# Patient Record
Sex: FEMALE | Race: WHITE | NOT HISPANIC OR LATINO | Employment: OTHER | URBAN - METROPOLITAN AREA
[De-identification: names, ages, dates, MRNs, and addresses within clinical notes are randomized per-mention and may not be internally consistent; named-entity substitution may affect disease eponyms.]

---

## 2018-02-15 ENCOUNTER — CONVERSION ENCOUNTER (OUTPATIENT)
Dept: FAMILY MEDICINE CLINIC | Facility: CLINIC | Age: 58
End: 2018-02-15

## 2018-02-15 ENCOUNTER — OFFICE VISIT CONVERTED (OUTPATIENT)
Dept: FAMILY MEDICINE CLINIC | Facility: CLINIC | Age: 58
End: 2018-02-15
Attending: NURSE PRACTITIONER

## 2018-06-22 ENCOUNTER — OFFICE VISIT CONVERTED (OUTPATIENT)
Dept: FAMILY MEDICINE CLINIC | Facility: CLINIC | Age: 58
End: 2018-06-22
Attending: NURSE PRACTITIONER

## 2018-07-12 ENCOUNTER — OFFICE VISIT CONVERTED (OUTPATIENT)
Dept: PODIATRY | Facility: CLINIC | Age: 58
End: 2018-07-12
Attending: PODIATRIST

## 2018-07-12 ENCOUNTER — CONVERSION ENCOUNTER (OUTPATIENT)
Dept: PODIATRY | Facility: CLINIC | Age: 58
End: 2018-07-12

## 2018-07-25 ENCOUNTER — OFFICE VISIT CONVERTED (OUTPATIENT)
Dept: FAMILY MEDICINE CLINIC | Facility: CLINIC | Age: 58
End: 2018-07-25
Attending: NURSE PRACTITIONER

## 2018-07-30 ENCOUNTER — CONVERSION ENCOUNTER (OUTPATIENT)
Dept: CT IMAGING | Facility: HOSPITAL | Age: 58
End: 2018-07-30

## 2018-08-01 ENCOUNTER — OFFICE VISIT CONVERTED (OUTPATIENT)
Dept: FAMILY MEDICINE CLINIC | Facility: CLINIC | Age: 58
End: 2018-08-01
Attending: NURSE PRACTITIONER

## 2018-08-02 ENCOUNTER — OFFICE VISIT CONVERTED (OUTPATIENT)
Dept: PODIATRY | Facility: CLINIC | Age: 58
End: 2018-08-02
Attending: PODIATRIST

## 2018-08-02 ENCOUNTER — CONVERSION ENCOUNTER (OUTPATIENT)
Dept: PODIATRY | Facility: CLINIC | Age: 58
End: 2018-08-02

## 2018-09-05 ENCOUNTER — OFFICE VISIT CONVERTED (OUTPATIENT)
Dept: FAMILY MEDICINE CLINIC | Facility: CLINIC | Age: 58
End: 2018-09-05
Attending: NURSE PRACTITIONER

## 2020-01-20 ENCOUNTER — HOSPITAL ENCOUNTER (OUTPATIENT)
Dept: OTHER | Facility: HOSPITAL | Age: 60
Discharge: HOME | End: 2020-01-20
Attending: NURSE PRACTITIONER

## 2020-01-20 ENCOUNTER — OFFICE VISIT CONVERTED (OUTPATIENT)
Dept: FAMILY MEDICINE CLINIC | Facility: CLINIC | Age: 60
End: 2020-01-20
Attending: NURSE PRACTITIONER

## 2020-01-20 ENCOUNTER — HOSPITAL ENCOUNTER (OUTPATIENT)
Dept: FAMILY MEDICINE CLINIC | Facility: CLINIC | Age: 60
Discharge: HOME | End: 2020-01-20
Attending: NURSE PRACTITIONER

## 2020-01-20 LAB
ALBUMIN SERPL-MCNC: 4.6 G/DL (ref 3.5–5)
ALBUMIN/GLOB SERPL: 1.6 {RATIO} (ref 1.4–2.6)
ALP SERPL-CCNC: 86 U/L (ref 53–141)
ALT SERPL-CCNC: 20 U/L (ref 10–40)
AMYLASE SERPL-CCNC: 69 U/L (ref 30–110)
ANION GAP SERPL CALC-SCNC: 18 MMOL/L (ref 8–19)
APPEARANCE UR: CLEAR
AST SERPL-CCNC: 20 U/L (ref 15–50)
BASOPHILS # BLD AUTO: 0.04 10*3/UL (ref 0–0.2)
BASOPHILS NFR BLD AUTO: 0.6 % (ref 0–3)
BILIRUB SERPL-MCNC: 0.34 MG/DL (ref 0.2–1.3)
BILIRUB UR QL: NEGATIVE
BUN SERPL-MCNC: 12 MG/DL (ref 5–25)
BUN/CREAT SERPL: 16 {RATIO} (ref 6–20)
CALCIUM SERPL-MCNC: 9.8 MG/DL (ref 8.7–10.4)
CHLORIDE SERPL-SCNC: 100 MMOL/L (ref 99–111)
COLOR UR: YELLOW
CONV ABS IMM GRAN: 0.01 10*3/UL (ref 0–0.2)
CONV BACTERIA: ABNORMAL
CONV CO2: 26 MMOL/L (ref 22–32)
CONV COLLECTION SOURCE (UA): ABNORMAL
CONV IMMATURE GRAN: 0.2 % (ref 0–1.8)
CONV TOTAL PROTEIN: 7.4 G/DL (ref 6.3–8.2)
CONV UROBILINOGEN IN URINE BY AUTOMATED TEST STRIP: 0.2 {EHRLICHU}/DL (ref 0.1–1)
CREAT UR-MCNC: 0.73 MG/DL (ref 0.5–0.9)
DEPRECATED RDW RBC AUTO: 43.5 FL (ref 36.4–46.3)
EOSINOPHIL # BLD AUTO: 0.09 10*3/UL (ref 0–0.7)
EOSINOPHIL # BLD AUTO: 1.4 % (ref 0–7)
ERYTHROCYTE [DISTWIDTH] IN BLOOD BY AUTOMATED COUNT: 12.5 % (ref 11.7–14.4)
GFR SERPLBLD BASED ON 1.73 SQ M-ARVRAT: >60 ML/MIN/{1.73_M2}
GLOBULIN UR ELPH-MCNC: 2.8 G/DL (ref 2–3.5)
GLUCOSE SERPL-MCNC: 92 MG/DL (ref 65–99)
GLUCOSE UR QL: NEGATIVE MG/DL
HCT VFR BLD AUTO: 45.2 % (ref 37–47)
HGB BLD-MCNC: 14.6 G/DL (ref 12–16)
HGB UR QL STRIP: NEGATIVE
KETONES UR QL STRIP: NEGATIVE MG/DL
LEUKOCYTE ESTERASE UR QL STRIP: ABNORMAL
LIPASE SERPL-CCNC: 61 U/L (ref 5–51)
LYMPHOCYTES # BLD AUTO: 1.87 10*3/UL (ref 1–5)
LYMPHOCYTES NFR BLD AUTO: 28.5 % (ref 20–45)
MCH RBC QN AUTO: 30.3 PG (ref 27–31)
MCHC RBC AUTO-ENTMCNC: 32.3 G/DL (ref 33–37)
MCV RBC AUTO: 93.8 FL (ref 81–99)
MONOCYTES # BLD AUTO: 0.6 10*3/UL (ref 0.2–1.2)
MONOCYTES NFR BLD AUTO: 9.1 % (ref 3–10)
NEUTROPHILS # BLD AUTO: 3.96 10*3/UL (ref 2–8)
NEUTROPHILS NFR BLD AUTO: 60.2 % (ref 30–85)
NITRITE UR QL STRIP: NEGATIVE
NRBC CBCN: 0 % (ref 0–0.7)
OSMOLALITY SERPL CALC.SUM OF ELEC: 287 MOSM/KG (ref 273–304)
PH UR STRIP.AUTO: 7 [PH] (ref 5–8)
PLATELET # BLD AUTO: 264 10*3/UL (ref 130–400)
PMV BLD AUTO: 11.6 FL (ref 9.4–12.3)
POTASSIUM SERPL-SCNC: 4.6 MMOL/L (ref 3.5–5.3)
PROT UR QL: NEGATIVE MG/DL
RBC # BLD AUTO: 4.82 10*6/UL (ref 4.2–5.4)
RBC #/AREA URNS HPF: ABNORMAL /[HPF]
SODIUM SERPL-SCNC: 139 MMOL/L (ref 135–147)
SP GR UR: 1.01 (ref 1–1.03)
SQUAMOUS SPT QL MICRO: ABNORMAL /[HPF]
WBC # BLD AUTO: 6.57 10*3/UL (ref 4.8–10.8)
WBC #/AREA URNS HPF: ABNORMAL /[HPF]

## 2020-01-22 LAB
AMOXICILLIN+CLAV SUSC ISLT: 4
AMPICILLIN SUSC ISLT: >=32
AMPICILLIN+SULBAC SUSC ISLT: 16
BACTERIA UR CULT: ABNORMAL
CEFAZOLIN SUSC ISLT: >=64
CEFEPIME SUSC ISLT: 2
CEFTAZIDIME SUSC ISLT: 16
CEFTRIAXONE SUSC ISLT: >=64
CEFUROXIME ORAL SUSC ISLT: >=64
CEFUROXIME PARENTER SUSC ISLT: >=64
CIPROFLOXACIN SUSC ISLT: 0.5
ERTAPENEM SUSC ISLT: <=0.5
GENTAMICIN SUSC ISLT: <=1
LEVOFLOXACIN SUSC ISLT: 1
NITROFURANTOIN SUSC ISLT: <=16
TETRACYCLINE SUSC ISLT: <=1
TMP SMX SUSC ISLT: <=20
TOBRAMYCIN SUSC ISLT: <=1

## 2020-01-23 ENCOUNTER — HOSPITAL ENCOUNTER (OUTPATIENT)
Dept: CT IMAGING | Facility: HOSPITAL | Age: 60
Discharge: HOME | End: 2020-01-23
Attending: NURSE PRACTITIONER

## 2020-01-31 ENCOUNTER — OFFICE VISIT CONVERTED (OUTPATIENT)
Dept: FAMILY MEDICINE CLINIC | Facility: CLINIC | Age: 60
End: 2020-01-31
Attending: FAMILY MEDICINE

## 2020-02-21 ENCOUNTER — OFFICE VISIT CONVERTED (OUTPATIENT)
Dept: FAMILY MEDICINE CLINIC | Facility: CLINIC | Age: 60
End: 2020-02-21
Attending: FAMILY MEDICINE

## 2020-02-21 ENCOUNTER — HOSPITAL ENCOUNTER (OUTPATIENT)
Dept: FAMILY MEDICINE CLINIC | Facility: CLINIC | Age: 60
Discharge: HOME | End: 2020-02-21
Attending: FAMILY MEDICINE

## 2020-02-21 LAB
ALBUMIN SERPL-MCNC: 4.5 G/DL (ref 3.5–5)
ALBUMIN/GLOB SERPL: 1.6 {RATIO} (ref 1.4–2.6)
ALP SERPL-CCNC: 83 U/L (ref 53–141)
ALT SERPL-CCNC: 15 U/L (ref 10–40)
ANION GAP SERPL CALC-SCNC: 19 MMOL/L (ref 8–19)
AST SERPL-CCNC: 18 U/L (ref 15–50)
BASOPHILS # BLD AUTO: 0.06 10*3/UL (ref 0–0.2)
BASOPHILS NFR BLD AUTO: 1.1 % (ref 0–3)
BILIRUB SERPL-MCNC: 0.26 MG/DL (ref 0.2–1.3)
BUN SERPL-MCNC: 12 MG/DL (ref 5–25)
BUN/CREAT SERPL: 17 {RATIO} (ref 6–20)
CALCIUM SERPL-MCNC: 9.5 MG/DL (ref 8.7–10.4)
CHLORIDE SERPL-SCNC: 104 MMOL/L (ref 99–111)
CONV ABS IMM GRAN: 0.01 10*3/UL (ref 0–0.2)
CONV CO2: 23 MMOL/L (ref 22–32)
CONV IMMATURE GRAN: 0.2 % (ref 0–1.8)
CONV TOTAL PROTEIN: 7.3 G/DL (ref 6.3–8.2)
CREAT UR-MCNC: 0.69 MG/DL (ref 0.5–0.9)
DEPRECATED RDW RBC AUTO: 43.1 FL (ref 36.4–46.3)
EOSINOPHIL # BLD AUTO: 0.12 10*3/UL (ref 0–0.7)
EOSINOPHIL # BLD AUTO: 2.2 % (ref 0–7)
ERYTHROCYTE [DISTWIDTH] IN BLOOD BY AUTOMATED COUNT: 12.5 % (ref 11.7–14.4)
GFR SERPLBLD BASED ON 1.73 SQ M-ARVRAT: >60 ML/MIN/{1.73_M2}
GLOBULIN UR ELPH-MCNC: 2.8 G/DL (ref 2–3.5)
GLUCOSE SERPL-MCNC: 97 MG/DL (ref 65–99)
HCT VFR BLD AUTO: 41.9 % (ref 37–47)
HGB BLD-MCNC: 13.2 G/DL (ref 12–16)
LYMPHOCYTES # BLD AUTO: 1.61 10*3/UL (ref 1–5)
LYMPHOCYTES NFR BLD AUTO: 29.4 % (ref 20–45)
MCH RBC QN AUTO: 29.6 PG (ref 27–31)
MCHC RBC AUTO-ENTMCNC: 31.5 G/DL (ref 33–37)
MCV RBC AUTO: 93.9 FL (ref 81–99)
MONOCYTES # BLD AUTO: 0.51 10*3/UL (ref 0.2–1.2)
MONOCYTES NFR BLD AUTO: 9.3 % (ref 3–10)
NEUTROPHILS # BLD AUTO: 3.16 10*3/UL (ref 2–8)
NEUTROPHILS NFR BLD AUTO: 57.8 % (ref 30–85)
NRBC CBCN: 0 % (ref 0–0.7)
OSMOLALITY SERPL CALC.SUM OF ELEC: 294 MOSM/KG (ref 273–304)
PLATELET # BLD AUTO: 242 10*3/UL (ref 130–400)
PMV BLD AUTO: 11.6 FL (ref 9.4–12.3)
POTASSIUM SERPL-SCNC: 4.4 MMOL/L (ref 3.5–5.3)
RBC # BLD AUTO: 4.46 10*6/UL (ref 4.2–5.4)
SODIUM SERPL-SCNC: 142 MMOL/L (ref 135–147)
T4 FREE SERPL-MCNC: 1.2 NG/DL (ref 0.9–1.8)
TSH SERPL-ACNC: 0.91 M[IU]/L (ref 0.27–4.2)
WBC # BLD AUTO: 5.47 10*3/UL (ref 4.8–10.8)

## 2020-03-02 ENCOUNTER — OFFICE VISIT CONVERTED (OUTPATIENT)
Dept: GASTROENTEROLOGY | Facility: CLINIC | Age: 60
End: 2020-03-02
Attending: NURSE PRACTITIONER

## 2020-04-20 ENCOUNTER — TELEPHONE CONVERTED (OUTPATIENT)
Dept: FAMILY MEDICINE CLINIC | Facility: CLINIC | Age: 60
End: 2020-04-20
Attending: FAMILY MEDICINE

## 2020-05-28 LAB — SARS-COV-2 RNA SPEC QL NAA+PROBE: NOT DETECTED

## 2020-05-29 ENCOUNTER — HOSPITAL ENCOUNTER (OUTPATIENT)
Dept: GASTROENTEROLOGY | Facility: HOSPITAL | Age: 60
Setting detail: HOSPITAL OUTPATIENT SURGERY
Discharge: HOME | End: 2020-05-29
Attending: INTERNAL MEDICINE

## 2020-05-29 LAB
C DIFF TOX B STL QL CT TISS CULT: POSITIVE
CONV 027 TOXIN: NEGATIVE

## 2020-05-31 LAB — BACTERIA SPEC AEROBE CULT: NORMAL

## 2020-06-10 ENCOUNTER — HOSPITAL ENCOUNTER (OUTPATIENT)
Dept: GENERAL RADIOLOGY | Facility: HOSPITAL | Age: 60
Discharge: HOME | End: 2020-06-10
Attending: FAMILY MEDICINE

## 2020-07-07 ENCOUNTER — OFFICE VISIT CONVERTED (OUTPATIENT)
Dept: FAMILY MEDICINE CLINIC | Facility: CLINIC | Age: 60
End: 2020-07-07
Attending: NURSE PRACTITIONER

## 2020-07-07 ENCOUNTER — HOSPITAL ENCOUNTER (OUTPATIENT)
Dept: FAMILY MEDICINE CLINIC | Facility: CLINIC | Age: 60
Discharge: HOME | End: 2020-07-07
Attending: NURSE PRACTITIONER

## 2020-07-07 LAB
ANION GAP SERPL CALC-SCNC: 17 MMOL/L (ref 8–19)
BASOPHILS # BLD AUTO: 0.04 10*3/UL (ref 0–0.2)
BASOPHILS NFR BLD AUTO: 0.7 % (ref 0–3)
BUN SERPL-MCNC: 8 MG/DL (ref 5–25)
BUN/CREAT SERPL: 10 {RATIO} (ref 6–20)
CALCIUM SERPL-MCNC: 9.9 MG/DL (ref 8.7–10.4)
CHLORIDE SERPL-SCNC: 104 MMOL/L (ref 99–111)
CONV ABS IMM GRAN: 0.01 10*3/UL (ref 0–0.2)
CONV CO2: 24 MMOL/L (ref 22–32)
CONV IMMATURE GRAN: 0.2 % (ref 0–1.8)
CREAT UR-MCNC: 0.82 MG/DL (ref 0.5–0.9)
DEPRECATED RDW RBC AUTO: 44.4 FL (ref 36.4–46.3)
EOSINOPHIL # BLD AUTO: 0.23 10*3/UL (ref 0–0.7)
EOSINOPHIL # BLD AUTO: 4 % (ref 0–7)
ERYTHROCYTE [DISTWIDTH] IN BLOOD BY AUTOMATED COUNT: 12.7 % (ref 11.7–14.4)
GFR SERPLBLD BASED ON 1.73 SQ M-ARVRAT: >60 ML/MIN/{1.73_M2}
GLUCOSE SERPL-MCNC: 79 MG/DL (ref 65–99)
HCT VFR BLD AUTO: 45.1 % (ref 37–47)
HGB BLD-MCNC: 13.7 G/DL (ref 12–16)
LYMPHOCYTES # BLD AUTO: 1.58 10*3/UL (ref 1–5)
LYMPHOCYTES NFR BLD AUTO: 27.6 % (ref 20–45)
MCH RBC QN AUTO: 28.8 PG (ref 27–31)
MCHC RBC AUTO-ENTMCNC: 30.4 G/DL (ref 33–37)
MCV RBC AUTO: 94.7 FL (ref 81–99)
MONOCYTES # BLD AUTO: 0.66 10*3/UL (ref 0.2–1.2)
MONOCYTES NFR BLD AUTO: 11.5 % (ref 3–10)
NEUTROPHILS # BLD AUTO: 3.21 10*3/UL (ref 2–8)
NEUTROPHILS NFR BLD AUTO: 56 % (ref 30–85)
NRBC CBCN: 0 % (ref 0–0.7)
OSMOLALITY SERPL CALC.SUM OF ELEC: 287 MOSM/KG (ref 273–304)
PLATELET # BLD AUTO: 231 10*3/UL (ref 130–400)
PMV BLD AUTO: 11.7 FL (ref 9.4–12.3)
POTASSIUM SERPL-SCNC: 4.7 MMOL/L (ref 3.5–5.3)
RBC # BLD AUTO: 4.76 10*6/UL (ref 4.2–5.4)
SODIUM SERPL-SCNC: 140 MMOL/L (ref 135–147)
WBC # BLD AUTO: 5.73 10*3/UL (ref 4.8–10.8)

## 2020-07-08 LAB
C DIFF TOX B STL QL CT TISS CULT: POSITIVE
CONV 027 TOXIN: NEGATIVE

## 2020-07-17 ENCOUNTER — HOSPITAL ENCOUNTER (OUTPATIENT)
Dept: GENERAL RADIOLOGY | Facility: HOSPITAL | Age: 60
Discharge: HOME | End: 2020-07-17
Attending: FAMILY MEDICINE

## 2020-07-31 ENCOUNTER — OFFICE VISIT CONVERTED (OUTPATIENT)
Dept: FAMILY MEDICINE CLINIC | Facility: CLINIC | Age: 60
End: 2020-07-31
Attending: NURSE PRACTITIONER

## 2020-07-31 ENCOUNTER — HOSPITAL ENCOUNTER (OUTPATIENT)
Dept: GENERAL RADIOLOGY | Facility: HOSPITAL | Age: 60
Discharge: HOME | End: 2020-07-31
Attending: NURSE PRACTITIONER

## 2020-07-31 ENCOUNTER — HOSPITAL ENCOUNTER (OUTPATIENT)
Dept: FAMILY MEDICINE CLINIC | Facility: CLINIC | Age: 60
Discharge: HOME | End: 2020-07-31
Attending: NURSE PRACTITIONER

## 2020-07-31 LAB
ALBUMIN SERPL-MCNC: 4.2 G/DL (ref 3.5–5)
ALBUMIN/GLOB SERPL: 1.7 {RATIO} (ref 1.4–2.6)
ALP SERPL-CCNC: 77 U/L (ref 53–141)
ALT SERPL-CCNC: 18 U/L (ref 10–40)
ANION GAP SERPL CALC-SCNC: 18 MMOL/L (ref 8–19)
AST SERPL-CCNC: 20 U/L (ref 15–50)
BASOPHILS # BLD AUTO: 0.04 10*3/UL (ref 0–0.2)
BASOPHILS NFR BLD AUTO: 0.7 % (ref 0–3)
BILIRUB SERPL-MCNC: 0.22 MG/DL (ref 0.2–1.3)
BUN SERPL-MCNC: 12 MG/DL (ref 5–25)
BUN/CREAT SERPL: 15 {RATIO} (ref 6–20)
CALCIUM SERPL-MCNC: 9.9 MG/DL (ref 8.7–10.4)
CHLORIDE SERPL-SCNC: 103 MMOL/L (ref 99–111)
CONV ABS IMM GRAN: 0.01 10*3/UL (ref 0–0.2)
CONV CO2: 24 MMOL/L (ref 22–32)
CONV IMMATURE GRAN: 0.2 % (ref 0–1.8)
CONV TOTAL PROTEIN: 6.7 G/DL (ref 6.3–8.2)
CREAT UR-MCNC: 0.81 MG/DL (ref 0.5–0.9)
DEPRECATED RDW RBC AUTO: 43.8 FL (ref 36.4–46.3)
EOSINOPHIL # BLD AUTO: 0.17 10*3/UL (ref 0–0.7)
EOSINOPHIL # BLD AUTO: 3 % (ref 0–7)
ERYTHROCYTE [DISTWIDTH] IN BLOOD BY AUTOMATED COUNT: 12.8 % (ref 11.7–14.4)
GFR SERPLBLD BASED ON 1.73 SQ M-ARVRAT: >60 ML/MIN/{1.73_M2}
GLOBULIN UR ELPH-MCNC: 2.5 G/DL (ref 2–3.5)
GLUCOSE SERPL-MCNC: 106 MG/DL (ref 65–99)
HCT VFR BLD AUTO: 41.1 % (ref 37–47)
HGB BLD-MCNC: 13 G/DL (ref 12–16)
LYMPHOCYTES # BLD AUTO: 1.76 10*3/UL (ref 1–5)
LYMPHOCYTES NFR BLD AUTO: 30.7 % (ref 20–45)
MAGNESIUM SERPL-MCNC: 2.26 MG/DL (ref 1.6–2.3)
MCH RBC QN AUTO: 29.5 PG (ref 27–31)
MCHC RBC AUTO-ENTMCNC: 31.6 G/DL (ref 33–37)
MCV RBC AUTO: 93.2 FL (ref 81–99)
MONOCYTES # BLD AUTO: 0.45 10*3/UL (ref 0.2–1.2)
MONOCYTES NFR BLD AUTO: 7.8 % (ref 3–10)
NEUTROPHILS # BLD AUTO: 3.31 10*3/UL (ref 2–8)
NEUTROPHILS NFR BLD AUTO: 57.6 % (ref 30–85)
NRBC CBCN: 0 % (ref 0–0.7)
OSMOLALITY SERPL CALC.SUM OF ELEC: 292 MOSM/KG (ref 273–304)
PHOSPHATE SERPL-MCNC: 4 MG/DL (ref 2.4–4.5)
PLATELET # BLD AUTO: 246 10*3/UL (ref 130–400)
PMV BLD AUTO: 11.9 FL (ref 9.4–12.3)
POTASSIUM SERPL-SCNC: 4.1 MMOL/L (ref 3.5–5.3)
RBC # BLD AUTO: 4.41 10*6/UL (ref 4.2–5.4)
SODIUM SERPL-SCNC: 141 MMOL/L (ref 135–147)
WBC # BLD AUTO: 5.74 10*3/UL (ref 4.8–10.8)

## 2020-08-14 ENCOUNTER — TELEPHONE CONVERTED (OUTPATIENT)
Dept: FAMILY MEDICINE CLINIC | Facility: CLINIC | Age: 60
End: 2020-08-14
Attending: FAMILY MEDICINE

## 2020-09-28 ENCOUNTER — HOSPITAL ENCOUNTER (OUTPATIENT)
Dept: URGENT CARE | Facility: CLINIC | Age: 60
Discharge: HOME | End: 2020-09-28

## 2020-09-30 LAB — SARS-COV-2 RNA SPEC QL NAA+PROBE: NOT DETECTED

## 2020-10-01 LAB — BACTERIA SPEC AEROBE CULT: NORMAL

## 2020-10-16 ENCOUNTER — HOSPITAL ENCOUNTER (OUTPATIENT)
Dept: OTHER | Facility: HOSPITAL | Age: 60
Discharge: HOME | End: 2020-10-16
Attending: FAMILY MEDICINE

## 2020-10-16 ENCOUNTER — OFFICE VISIT CONVERTED (OUTPATIENT)
Dept: FAMILY MEDICINE CLINIC | Facility: CLINIC | Age: 60
End: 2020-10-16
Attending: FAMILY MEDICINE

## 2020-10-16 LAB — BNP SERPL-MCNC: 57 PG/ML (ref 0–900)

## 2021-05-12 NOTE — PROGRESS NOTES
Quick Note      Patient Name: Shahida Arroyo   Patient ID: [id number]   Sex: Female   YOB: 1960    Primary Care Provider: Ivana BERNAL   Referring Provider: Ivana BERNAL    Visit Date: April 20, 2020    Provider: Marjan Gonzalez MD   Location: [facility]   Location Address: [address] [place] [address], [address]  [id number]   Location Phone: [phone]          History Of Present Illness  TELEHEALTH TELEPHONE VISIT  Chief Complaint: 6 month follow up   Shahida Arroyo is a 59 year old /White female who is presenting for evaluation via telehealth telephone visit. Verbal consent obtained before beginning visit.   Provider spent 25 minutes with the patient during telehealth visit.   The following staff were present during this visit: Jeannie Sumner   Past Medical History/Overview of Patient Symptoms     Osteopeniapatient had a bone density test that was done July 2018 which showed osteopenia of the lumbar spine and osteopenia of the hips and osteoporosis of the right trochanter.  I will be repeating her DEXA scan and refilling her alendronate.    IBSPatient was supposed to have a Cologuard test done and 2018 but did not do it at that time.  Patient has problems with her bowels such as irritable bowel disease so I will be putting in a referral for her to go to gastro where she most likely will have to have a colonoscopy due to her bowel issues.  In the meantime I will be giving her prescription for Xifaxan which hopefully will help with her irritable bowel symptoms.           Assessment  · IBS (irritable bowel syndrome)     564.1/K58.9  · Osteopenia     733.90/M85.80      Plan  · Orders  o Physician Telephone Evaluation, 21-30 minutes (29218) - 564.1/K58.9, 733.90/M85.80 - 04/20/2020  · Medications  o Xifaxan 550 mg oral tablet   SIG: take 1 tablet (550 mg) by oral route 3 times per day for 14 days   DISP: (42) tablets with 0 refills  Prescribed on 04/20/2020      o alendronate 70 mg oral tablet   SIG: take 1 tablet (70 mg) by oral route once weekly in the morning, at least 30 min before food   DISP: (12) tablets with 3 refills  Prescribed on 04/20/2020     o buspirone 5 mg oral tablet   SIG: take 1 tablet by oral route TID PRN for anxiety   DISP: (90) tablets with 1 refills  Prescribed on 04/20/2020     o Myrbetriq 50 mg oral tablet extended release 24 hr   SIG: take 1 tablet (50 mg) by oral route once daily swallowing whole with water. Do not crush, chew and/or divide. for 90 days   DISP: (90) tablets with 1 refills  Refilled on 04/20/2020     o Medications have been Reconciled  o Transition of Care or Provider Policy  · Instructions  o Plan Of Care:   o Chronic conditions reviewed and taken into consideration for today's treatment plan.  o Discussed Covid-19 precautions including, but not limited to, social distancing, avoid touching your face, and hand washing.   · Disposition  o Return Visit Request in/on 3 months +/- 0 days ([id number]).            Electronically Signed by: Marjan Gonzalez MD -Author on June 17, 2020 03:43:00 PM

## 2021-05-13 NOTE — PROGRESS NOTES
Progress Note      Patient Name: Shahida Arroyo   Patient ID: [id number]   Sex: Female   YOB: 1960    Primary Care Provider: Marjan Gonzalez MD   Referring Provider: Marjan Gonzalez MD    Visit Date: August 14, 2020    Provider: Marjan Gonzalez MD   Location: [facility]   Location Address: [address] [place] [address], [address]  [id number]   Location Phone: [phone]          Chief Complaint  · Anxiety follow-up      History Of Present Illness  TELEHEALTH TELEPHONE VISIT  Shahida Arroyo is a 60 year old /White female who is presenting for evaluation via telehealth telephone visit. Verbal consent obtained before beginning visit.   Provider spent 25 minutes with patient during telehealth visit.   The following staff were present during this visit: Jeannie Sumner   Past Medical History/Overview of Patient Symptoms     Anxietypatient reports that the buspirone 5 mg take as needed 3 times a day has been working well for her anxiety symptoms and helped her to feel more at ease.  Patient feels like this may also be helping some of her depression symptoms.  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  Patient reports that her  has asked why she is not taking hormone medication to help with some of her mood swings but patient reports that she does not feel she needs anything at this time.       Past Medical History  *No Pertinent Past Medical History; Arthritis; Cystocele; Foot pain, left; Foot pain, right; Heel pain; Hiatal hernia; History of Clostridioides difficile colitis         Past Surgical History  Bladder Repair; Bladder Surgery; Cystoscopy; Hysterectomy; Salpingo-oophorectomy         Medication List  alendronate 70 mg oral tablet; B12 5,000-100 mcg sublingual lozenge; buspirone 5 mg oral tablet; diclofenac sodium 75 mg oral tablet,delayed release (DR/EC); methocarbamol 500 mg oral tablet;  multivitamin oral capsule; oxybutynin chloride 10 mg oral tablet extended release 24hr; pantoprazole 20 mg oral tablet,delayed release (DR/EC)         Allergy List  NO KNOWN DRUG ALLERGIES; Rapaflo         Family Medical History  Stroke; Heart Disease; Cancer, Unspecified; Family history of breast cancer; Diabetes; No family history of colorectal cancer; Family history of heart disease         Reproductive History   4 Para 4 0 0 0       Social History  Alcohol (Never); ; ; Single; Tobacco (Never); Working         Immunizations  Name Date Admin   Influenza Refused         Review of Systems  · Constitutional  o Denies  o : fatigue, fever, weight loss, weight gain  · Eyes  o Denies  o : eye discomfort, eye pain  · HENT  o Denies  o : vertigo, nasal congestion  · Respiratory  o Denies  o : shortness of breath, wheezing  · Gastrointestinal  o Denies  o : nausea, vomiting  · Genitourinary  o Denies  o : frequency, dysuria  · Integument  o Denies  o : rash, itching  · Neurologic  o Denies  o : muscular weakness, memory difficulties  · Musculoskeletal  o Denies  o : joint swelling, muscle pain  · Psychiatric  o Admits  o : anxiety, depression  · Heme-Lymph  o Denies  o : lightheadedness, easy bleeding  · Allergic-Immunologic  o Denies  o : sinus allergy symptoms, allergic dermatitis          Assessment  · Anxiety disorder     300.00/F41.9  · Urinary incontinence     788.30/R32      Plan  · Orders  o ACO-39: Current medications updated and reviewed () - - 2020  o Physician Telephone Evaluation, 21-30 minutes (09076) - 788.30/R32 - 2020  · Medications  o buspirone 5 mg oral tablet   SIG: TAKE 1 TABLET BY MOUTH THREE TIMES DAILY AS NEEDED FOR ANXIETY for 30 days   DISP: (90) Tablet with 1 refills  Refilled on 2020     o oxybutynin chloride 10 mg oral tablet extended release 24hr   SIG: take 1 tablet (10 mg) by oral route once daily for 90 days   DISP: (90) tablets with 1  refills  Refilled on 08/14/2020     o Medications have been Reconciled  o Transition of Care or Provider Policy  · Instructions  o Plan Of Care:   o Chronic conditions reviewed and taken into consideration for today's treatment plan.  o Discussed Covid-19 precautions including, but not limited to, social distancing, avoid touching your face, and hand washing.   · Disposition  o Return Visit Request in/on 6 months +/- 0 days ([id number]).            Electronically Signed by: Marjan Gonzalez MD -Author on August 14, 2020 02:23:32 PM

## 2021-05-13 NOTE — PROGRESS NOTES
Progress Note      Patient Name: Shahida Arroyo   Patient ID: [id number]   Sex: Female   YOB: 1960    Primary Care Provider: Marjan Gonzalez MD   Referring Provider: Marjan Gonzalez MD    Visit Date: July 31, 2020    Provider: GABE Alberto   Location: [facility]   Location Address: [address] [place] [address], [address] [place] [address]  [id number]   Location Phone: [phone]          Chief Complaint  · left leg pain/numbness  · feels like hands locking up      History Of Present Illness  Shahida Arroyo is a 60 year old /White female who presents for evaluation and treatment of:      Patient is a 60-year-old female who comes in for an acute appointment.  Patient's PCP is Dr. tovar.  She comes in for left hip pain that radiates down her left leg and left leg swelling in the evening time, gone down when she awakes.  No known injury, recent DEXA scan was completed.  Unsure causality of pain of her left hip.  No previous imaging has been done.  She states her left hip is been bothering her for the past 2 weeks.  She states she has some numbness and tingling in her left lower extremity.  She states yesterday she had an episode where her hands cramped up bilaterally and was and I told to use them for a few moments until she can work the cramps out.  States that she is staying well-hydrated.  Denies any dizziness, lightheadedness, or neurological deficit.       Past Medical History  Disease Name Date Onset Notes   *No Pertinent Past Medical History --  --    Arthritis --  --    Cystocele --  --    Foot pain, left 08/02/2018 --    Foot pain, right 08/02/2018 --    Heel pain --  --    Hiatal hernia 07/07/2020 --    History of Clostridioides difficile colitis 07/07/2020 --          Past Surgical History  Procedure Name Date Notes   Bladder Repair --  [place] midurethral sling 08/02/2017   Bladder Surgery --  --    Cystoscopy 8-2017 [place] midurethral sling   Hysterectomy 8-2017 --     Salpingo-oophorectomy  --          Medication List  Name Date Started Instructions   alendronate 70 mg oral tablet 2020 take 1 tablet (70 mg) by oral route once weekly in the morning, at least 30 min before food   B12 5,000-100 mcg sublingual lozenge  dissolve 1 lozenge by sublingual route daily   buspirone 5 mg oral tablet 2020 TAKE 1 TABLET BY MOUTH THREE TIMES DAILY AS NEEDED FOR ANXIETY   multivitamin oral capsule  take 1 capsule by oral route daily   oxybutynin chloride 10 mg oral tablet extended release 24hr 2020 take 1 tablet (10 mg) by oral route once daily for 90 days   pantoprazole 20 mg oral tablet,delayed release (DR/EC)  take 1 tablet (20 mg) by oral route once daily         Allergy List  Allergen Name Date Reaction Notes   NO KNOWN DRUG ALLERGIES --  --  --    Rapaflo --  rash --          Family Medical History  Disease Name Relative/Age Notes   Stroke Mother/   --    Heart Disease Father/  Mother/   --    Cancer, Unspecified Aunt/?  Father/  Grandfather (maternal)/?  Grandfather (paternal)/?  Grandmother (maternal)/?  Grandmother (paternal)/?  Mother/  Uncle/?   --    Family history of breast cancer Mother/   Mother   Diabetes Aunt/  Father/  Mother/  Uncle/   --    No family history of colorectal cancer  --    Family history of heart disease Father/   Father         Reproductive History  Menstrual   Last Menstrual Period: 2010 Certainty of LMP Date: POSTMENOPAUSAL   Pregnancy Summary   Total Pregnancies: 4 Full Term: 4 Premature: 0   Ab Induced: 0 Ab Spontaneous: 0 Ectopics: 0   Multiples: 0 Livin         Social History  Finding Status Start/Stop Quantity Notes   Alcohol Never --/-- --  2017 -     --  --/-- --  --     --  --/-- --  four children   Single --  --/-- --  --    Tobacco Never --/-- --  --    Working --  --/-- --  --          Immunizations  NameDate Admin Mfg Trade Name Lot Number Route Inj VIS Given VIS Publication  "  InfluenzaRefused 01/20/2020 NE Not Entered  NE NE     Comments:          Review of Systems  · Constitutional  o Denies  o : fever, fatigue, weight loss, weight gain  · Cardiovascular  o Denies  o : lower extremity edema, claudication, chest pressure, palpitations  · Respiratory  o Denies  o : shortness of breath, wheezing, cough, hemoptysis, dyspnea on exertion  · Gastrointestinal  o Denies  o : nausea, vomiting, diarrhea, constipation, abdominal pain  · Neurologic  o Admits  o : tingling or numbness  o Denies  o : altered mental status, muscular weakness, incoordination  · Musculoskeletal  o Admits  o : muscle pain, hip pain  o Denies  o : joint pain, joint swelling, muscle cramps      Vitals  Date Time BP Position Site L\R Cuff Size HR RR TEMP (F) WT  HT  BMI kg/m2 BSA m2 O2 Sat        07/31/2020 11:21 /64 Sitting    66 - R  97.5 146lbs 4oz 5'  9\" 21.6 1.8 97 %          Physical Examination  · Constitutional  o Appearance  o : well-nourished, well developed, in no acute distress  · Eyes  o Conjunctivae  o : conjunctivae normal, no exudates present  o Sclerae  o : sclerae white  o Pupils and Irises  o : pupils equal and round, and reactive to light and accomodation bilaterally  o Eyelids/Ocular Adnexae  o : extra ocular movements intact  · Respiratory  o Respiratory Effort  o : breathing unlabored, no accessory muscle use  o Inspection of Chest  o : normal appearance, no retractions  o Auscultation of Lungs  o : normal breath sounds bilaterally  · Cardiovascular  o Heart  o :   § Auscultation of Heart  § : regular rate and rhythm, no murmurs, gallops or rubs  o Peripheral Vascular System  o :   § Extremities  § : no edema  · Musculoskeletal  o General  o :   § General Musculoskeletal  § : Left hip: No obvious deformity or swelling, pain at the anterior and lateral AC joint of the hip, neurovascularly intact bilateral lower extremities  o Spine  o :   § Inspection/Palpation  § : no spinal tenderness or " misalignment  · Neurologic  o Mental Status Examination  o :   § Orientation  § : alert and oriented x3  § Speech/Language  § : normal speech pattern  o Gait and Station  o : normal gait, able to stand without difficulty              Assessment  · Polyarthralgia     719.49/M25.50  · Muscle cramps     729.82/R25.2  Will check labs, electrolytes to rule out any underlying issues. Patient is agreeable treatment plan.  · Left hip pain     719.45/M25.552  X-ray of left hip, will prescribe anti-inflammatories and muscle x-rays. Discussed to follow-up with Dr. tovar if no improvement. Patient verbalized understanding.    Problems Reconciled  Plan  · Orders  o CBC with Auto Diff [facility] (16183) - 729.82/R25.2, 719.49/M25.50, 719.45/M25.552 - 07/31/2020  o CMP [facility] ([id number]) - 729.82/R25.2, 719.49/M25.50, 719.45/M25.552 - 07/31/2020  o ACO-39: Current medications updated and reviewed () - - 07/31/2020  o Magnesium, serum (35995) - 729.82/R25.2, 719.49/M25.50, 719.45/M25.552 - 07/31/2020  o Phosphorus ser (51696) - 729.82/R25.2, 719.49/M25.50, 719.45/M25.552 - 07/31/2020  o Xray hip left 2 or more views (includes AP Pelvis) [facility] (58063) - 719.45/M25.552 - 07/31/2020  · Medications  o diclofenac sodium 75 mg oral tablet,delayed release (/EC)   SIG: take 1 tablet (75 mg) by oral route 2 times per day for 30 days   DISP: (60) tablets with 1 refills  Prescribed on 07/31/2020     o methocarbamol 500 mg oral tablet   SIG: take 1 tablet by oral route 2 times a day as needed for 30 days muscle pain and spams   DISP: (60) tablets with 1 refills  Prescribed on 07/31/2020     o Medications have been Reconciled  o Transition of Care or Provider Policy  · Instructions  o (NSAID) Non-steroidal Anti-inflammatory medication was recommended/prescribed. Ensure you take this medication with food as directed. Do not use Motrin/ibuprofen/Advil while using the anti-inflammatory medication prescribed.  o Take all medications as  prescribed/directed.  o Patient was educated/instructed on their diagnosis, treatment and medications prior to discharge from the clinic today.  o Call the office with any concerns or questions.  · Disposition  o Call or Return if symptoms worsen or persist.  o follow up as needed  o call the office with any questions or concerns            Electronically Signed by: Negro Love APRN -Author on July 31, 2020 12:27:58 PM

## 2021-05-13 NOTE — PROGRESS NOTES
Progress Note      Patient Name: Shahida Arroyo   Patient ID: [id number]   Sex: Female   YOB: 1960    Primary Care Provider: Marjan Gonzalez MD   Referring Provider: Marjan Gonzalez MD    Visit Date: October 16, 2020    Provider: Marjan Gonzalez MD   Location: [facility]   Location Address: [address] [place] [address], [address] [place] [address]  [id number]   Location Phone: [phone]          Chief Complaint  · [facility] Follow up      History Of Present Illness  Shahida Arroyo is a 60 year old /White female who presents for evaluation and treatment of:      Patient was seen [facility] on September 28, 2020 for dyspnea on exertion.  Patient reports that she has trouble breathing when she lays down.  Patient had an EKG at [facility] that was normal.  Patient also had a chest x-ray that was normal.  Patient has a significant family history for heart disease and her daughter actually has heart issues.  With patient having chest pain shortness of breath or trouble breathing while laying down no history of  smoking I will be getting a CT chest.    Chest Congestion- pt reports that her chest feels tight but heavy.  Pt reports that has been occurring for the last month. Pt reports the albuterol inhaler does not help.  Pt has a slight cough. Pt has not congestion. Pt reports she notices she was having chest pain from breathing.       Past Medical History  *No Pertinent Past Medical History; Arthritis; Cystocele; Foot pain, left; Foot pain, right; Heel pain; Hiatal hernia; History of Clostridioides difficile colitis         Past Surgical History  Bladder Repair; Bladder Surgery; Cystoscopy; Hysterectomy; Salpingo-oophorectomy         Medication List  alendronate 70 mg oral tablet; B12 5,000-100 mcg sublingual lozenge; buspirone 5 mg oral tablet; diclofenac sodium 75 mg oral tablet,delayed release (DR/EC); methocarbamol 500 mg oral tablet; multivitamin oral capsule; oxybutynin chloride 10 mg  "oral tablet extended release 24hr; pantoprazole 20 mg oral tablet,delayed release (DR/EC); Ventolin HFA 90 mcg/actuation inhalation HFA aerosol inhaler         Allergy List  NO KNOWN DRUG ALLERGIES; Rapaflo       Allergies Reconciled  Family Medical History  Stroke; Heart Disease; Cancer, Unspecified; Family history of breast cancer; Diabetes; No family history of colorectal cancer; Family history of heart disease         Reproductive History   4 Para 4 0 0 0       Social History  Alcohol (Never); ; ; Single; Tobacco (Never); Working         Immunizations  Name Date Admin   Influenza Refused         Review of Systems  · Constitutional  o Denies  o : fatigue, fever, weight loss, weight gain  · Eyes  o Denies  o : eye discomfort, eye pain  · HENT  o Denies  o : vertigo, nasal congestion  · Cardiovascular  o Admits  o : chest pain, dyspnea on exertion, orthopnea  · Genitourinary  o Denies  o : frequency, dysuria  · Integument  o Denies  o : rash, itching  · Neurologic  o Denies  o : muscular weakness, memory difficulties  · Musculoskeletal  o Denies  o : joint swelling, muscle pain  · Psychiatric  o Denies  o : anxiety, depression  · Heme-Lymph  o Denies  o : lightheadedness, easy bleeding  · Allergic-Immunologic  o Denies  o : sinus allergy symptoms, allergic dermatitis      Vitals  Date Time BP Position Site L\R Cuff Size HR RR TEMP (F) WT  HT  BMI kg/m2 BSA m2 O2 Sat FR L/min FiO2        10/16/2020 08:57 /64 Sitting    81 - R 16 97.5 146lbs 0oz 5'  9\" 21.56 1.8 98 %            Physical Examination  · Constitutional  o Appearance  o : well-nourished, in no acute distress  · Eyes  o Conjunctivae  o : conjunctivae normal  o Sclerae  o : sclerae white  o Pupils and Irises  o : pupils equal, round, and reactive to light and accommodation bilaterally  o Eyelids/Ocular Adnexae  o : eyelid appearance normal, no exudates present  · Ears, Nose, Mouth and Throat  o Ears  o :   § External " Ears  § : external auditory canal appearance within normal limits, no discharge present  § Otoscopic Examination  § : tympanic membrane appearance within normal limits bilaterally  o Nose  o :   § External Nose  § : appearance normal  § Nasopharynx  § : no discharge present  o Oral Cavity  o :   § Oral Mucosa  § : oral mucosa normal  § Lips  § : lip appearance normal  o Throat  o :   § Oropharynx  § : no inflammation or lesions present, tonsils within normal limits  · Neck  o Inspection/Palpation  o : normal appearance, no masses or tenderness, trachea midline  o Range of Motion  o : cervical range of motion within normal limits  o Thyroid  o : gland size normal, nontender, no nodules or masses present on palpation  o Jugular Veins  o : JVP normal  · Respiratory  o Respiratory Effort  o : breathing unlabored  o Inspection of Chest  o : normal appearance  o Auscultation of Lungs  o : normal breath sounds throughout  · Cardiovascular  o Heart  o :   § Auscultation of Heart  § : regular rate and rhythm, no murmurs, gallops or rubs  o Peripheral Vascular System  o :   § Extremities  § : no edema  · Gastrointestinal  o Abdominal Examination  o : abdomen nontender to palpation, tone normal without rigidity or guarding, no masses present, bowel sounds present in all four quadrants  o Liver and spleen  o : no hepatomegaly present, liver nontender to palpation, spleen not palpable  o Hernias  o : no hernias present  · Lymphatic  o Neck  o : no lymphadenopathy present  · Musculoskeletal  o Spine  o :   § Inspection/Palpation  § : no spinal tenderness, scoliosis or kyphosis present  § Stability  § : no subluxations present  § Range of Motion  § : spine range of motion normal  o Right Upper Extremity  o :   § Inspection/Palpation  § : no tenderness to palpation, ROM normal  o Left Upper Extremity  o :   § Inspection/Palpation  § : no tenderness to palpation, ROM normal  o Right Lower Extremity  o :   § Inspection/Palpation  § :  no joint or limb tenderness to palpation, ROM normal  o Left Lower Extremity  o :   § Inspection/Palpation  § : no joint or limb tenderness to palpation, ROM normal  · Skin and Subcutaneous Tissue  o General Inspection  o : no rashes or lesions present, no areas of discoloration  o Body Hair  o : scalp palpation normal, hair normal for age, general body hair distribution normal for age  o Digits and Nails  o : no clubbing, cyanosis, deformities or edema present, normal appearing nails  · Neurologic  o Mental Status Examination  o :   § Orientation  § : grossly oriented to person, place and time  o Gait and Station  o : normal gait, able to stand without difficulty  · Psychiatric  o Judgement and Insight  o : judgment and insight intact  o Mood and Affect  o : mood normal, affect appropriate              Assessment  · Chest pain     786.50/R07.9  · Cough     786.2/R05  · Dyspnea on exertion     786.09/R06.00  · Orthopnea     786.02/R06.01      Plan  · Orders  o ACO-39: Current medications updated and reviewed (1159F, ) - - 10/16/2020  o ACO-14: Influenza immunization was not administered for reasons documented [facility] () - - 10/16/2020   declined   o BNP (brain natriuretic peptide measurement) (22429) - 786.02/R06.01, 786.09/R06.00 - 10/16/2020  o CT Chest without Contrast [facility] ([id number]) - 786.50/R07.9, 786.09/R06.00, 786.02/R06.01, 786.2/R05 - 10/16/2020  · Medications  o Medications have been Reconciled  o Transition of Care or Provider Policy  · Instructions  o Patient was educated/instructed on their diagnosis, treatment and medications prior to discharge from the clinic today.  o Minutes spent with patient including greater than 50% in Education/Counseling/Care Coordination.  o Time spent with the patient was minutes, more than 50% face to face. 25 minutes  · Disposition  o Call or Return if symptoms worsen or persist.            Electronically Signed by: Marjan Gonzalez MD -Author on October 16, 2020  09:35:48 AM

## 2021-05-13 NOTE — PROGRESS NOTES
Progress Note      Patient Name: Shahida Arroyo   Patient ID: [id number]   Sex: Female   YOB: 1960    Primary Care Provider: Marjan Gonzalez MD   Referring Provider: Ivana BERNAL    Visit Date: July 7, 2020    Provider: GABE Mcgill   Location: [facility]   Location Address: [address] [place] [address], [address]  [id number]   Location Phone: [phone]          Chief Complaint  · dizziness   · diarrhea and blood while wiping  · stomach pain      History Of Present Illness  Shahida Arroyo is a 59 year old /White female who presents for evaluation and treatment of:      For an acute visit today.  She is a patient of Dr. Gonzalez.    She is complaining of dizziness, diarrhea with some blood when wiping but not in her stools, and stomach pain.  She thinks the blood is just from irritation of having diarrhea.    She had a colonoscopy and an EGD a few months ago.  EGD showed hiatal hernia.  She also was found to have C. difficile and was given an antibiotic.       Past Medical History  Disease Name Date Onset Notes   *No Pertinent Past Medical History --  --    Arthritis --  --    Cystocele --  --    Foot pain, left 08/02/2018 --    Foot pain, right 08/02/2018 --    Heel pain --  --    Hiatal hernia 07/07/2020 --    History of Clostridioides difficile colitis 07/07/2020 --          Past Surgical History  Procedure Name Date Notes   Bladder Repair --  [facility] midurethral sling 08/02/2017   Bladder Surgery --  --    Cystoscopy 8-2017 [facility] midurethral sling   Hysterectomy 8-2017 --    Salpingo-oophorectomy 8-2017 --          Medication List  Name Date Started Instructions   alendronate 70 mg oral tablet 04/20/2020 take 1 tablet (70 mg) by oral route once weekly in the morning, at least 30 min before food   B12 5,000-100 mcg sublingual lozenge  dissolve 1 lozenge by sublingual route daily   buspirone 5 mg oral tablet 04/20/2020 take 1 tablet by oral route TID PRN for  anxiety   multivitamin oral capsule  take 1 capsule by oral route daily   oxybutynin chloride 10 mg oral tablet extended release 24hr 2020 take 1 tablet (10 mg) by oral route once daily for 90 days   pantoprazole 20 mg oral tablet,delayed release (DR/EC)  take 1 tablet (20 mg) by oral route once daily         Allergy List  Allergen Name Date Reaction Notes   NO KNOWN DRUG ALLERGIES --  --  --    Rapaflo --  rash --          Family Medical History  Disease Name Relative/Age Notes   Stroke Mother/   --    Heart Disease Father/  Mother/   --    Cancer, Unspecified Aunt/?  Father/  Grandfather (maternal)/?  Grandfather (paternal)/?  Grandmother (maternal)/?  Grandmother (paternal)/?  Mother/  Uncle/?   --    Family history of breast cancer Mother/   Mother   Diabetes Aunt/  Father/  Mother/  Uncle/   --    No family history of colorectal cancer  --    Family history of heart disease Father/   Father         Reproductive History  Menstrual   Last Menstrual Period: 2010 Certainty of LMP Date: POSTMENOPAUSAL   Pregnancy Summary   Total Pregnancies: 4 Full Term: 4 Premature: 0   Ab Induced: 0 Ab Spontaneous: 0 Ectopics: 0   Multiples: 0 Livin         Social History  Finding Status Start/Stop Quantity Notes   Alcohol Never --/-- --  2017 -     --  --/-- --  --     --  --/-- --  four children   Single --  --/-- --  --    Tobacco Never --/-- --  --    Working --  --/-- --  --          Immunizations  NameDate Admin Mfg Trade Name Lot Number Route Inj VIS Given VIS Publication   InfluenzaRefused 2020 NE Not Entered  NE NE     Comments:          Review of Systems  · Constitutional  o Denies  o : fever, fatigue, weight loss, weight gain  · Cardiovascular  o Denies  o : lower extremity edema, claudication, chest pressure, palpitations  · Respiratory  o Denies  o : shortness of breath, wheezing, cough, hemoptysis, dyspnea on exertion  · Gastrointestinal  o Admits  o : diarrhea, abdominal  "pain  o Denies  o : nausea, vomiting, constipation, blood in stools  · Genitourinary  o Denies  o : urgency, frequency      Vitals  Date Time BP Position Site L\R Cuff Size HR RR TEMP (F) WT  HT  BMI kg/m2 BSA m2 O2 Sat HC       07/07/2020 09:35 /62 Sitting    79 - R  97.9 146lbs 16oz 5'  9\" 21.71 1.8 98 %          Physical Examination  · Constitutional  o Appearance  o : no acute distress, well-nourished  · Head and Face  o Head  o :   § Inspection  § : atraumatic, normocephalic  · Respiratory  o Respiratory Effort  o : breathing comfortably, symmetric chest rise  o Auscultation of Lungs  o : clear to asculatation bilaterally, no wheezes, rales, or rhonchii  · Cardiovascular  o Heart  o :   § Auscultation of Heart  § : regular rate and rhythm, no murmurs, rubs, or gallops  o Peripheral Vascular System  o :   § Extremities  § : no edema  · Gastrointestinal  o Abdominal Examination  o :   § Abdomen  § : bowel sounds present, non-distended, non-tender  · Lymphatic  o Neck  o : no lymphadenopathy present  · Neurologic  o Mental Status Examination  o :   § Orientation  § : grossly oriented to person, place and time  o Gait and Station  o :   § Gait Screening  § : normal gait  · Psychiatric  o General  o : normal mood and affect          Assessment  · Diarrhea     787.91/R19.7  · History of Clostridioides difficile colitis     V12.79/Z86.19  · Dizziness     780.4/R42  · Hiatal hernia     553.3/K44.9    Problems Reconciled  Plan  · Orders  o C difficile Toxigenic Assay (PCR) [facility] ([id number]) - 787.91/R19.7 - 07/07/2020  o CBC with Auto Diff [facility] ([id number]) - 787.91/R19.7 - 07/07/2020  o BMP [facility] ([id number]) - 787.91/R19.7 - 07/07/2020  o ACO-39: Current medications updated and reviewed () - - 07/07/2020  · Medications  o Medications have been Reconciled  o Transition of Care or Provider Policy  · Instructions  o BRAT diet, Avoid dairy products.  o Handouts were given to patient: Brat diet  o Patient was educated/instructed " on their diagnosis, treatment and medications prior to discharge from the clinic today.  o Patient instructed to seek medical attention urgently for new or worsening symptoms.  o Call the office with any concerns or questions.  o Discussed Covid-19 precautions including, but not limited to, social distancing, avoid touching your face, and hand washing.   · Disposition  o Call or Return if symptoms worsen or persist.     Due to her recent history of C. difficile, and now diarrhea and abdominal pain, we will recheck her for C. difficile.  We will also check labs today for CBC and BMP, will call with results.             Electronically Signed by: GABE Mcgill -Author on July 7, 2020 10:39:11 AM

## 2021-05-14 VITALS
TEMPERATURE: 97.5 F | BODY MASS INDEX: 21.62 KG/M2 | SYSTOLIC BLOOD PRESSURE: 100 MMHG | RESPIRATION RATE: 16 BRPM | HEIGHT: 69 IN | OXYGEN SATURATION: 98 % | DIASTOLIC BLOOD PRESSURE: 64 MMHG | HEART RATE: 81 BPM | WEIGHT: 146 LBS

## 2021-05-15 VITALS
WEIGHT: 150.37 LBS | HEART RATE: 81 BPM | HEIGHT: 69 IN | SYSTOLIC BLOOD PRESSURE: 100 MMHG | DIASTOLIC BLOOD PRESSURE: 64 MMHG | BODY MASS INDEX: 22.27 KG/M2 | OXYGEN SATURATION: 96 % | TEMPERATURE: 97.9 F

## 2021-05-15 VITALS
SYSTOLIC BLOOD PRESSURE: 106 MMHG | WEIGHT: 151.5 LBS | OXYGEN SATURATION: 97 % | DIASTOLIC BLOOD PRESSURE: 68 MMHG | TEMPERATURE: 98.1 F | HEART RATE: 76 BPM | HEIGHT: 70 IN | BODY MASS INDEX: 21.69 KG/M2

## 2021-05-15 VITALS
DIASTOLIC BLOOD PRESSURE: 64 MMHG | BODY MASS INDEX: 21.66 KG/M2 | SYSTOLIC BLOOD PRESSURE: 110 MMHG | WEIGHT: 146.25 LBS | OXYGEN SATURATION: 97 % | HEART RATE: 66 BPM | HEIGHT: 69 IN | TEMPERATURE: 97.5 F

## 2021-05-15 VITALS
BODY MASS INDEX: 22.66 KG/M2 | DIASTOLIC BLOOD PRESSURE: 64 MMHG | WEIGHT: 153 LBS | HEART RATE: 84 BPM | SYSTOLIC BLOOD PRESSURE: 130 MMHG | HEIGHT: 69 IN | OXYGEN SATURATION: 97 % | RESPIRATION RATE: 14 BRPM | TEMPERATURE: 98.4 F

## 2021-05-15 VITALS
SYSTOLIC BLOOD PRESSURE: 108 MMHG | HEIGHT: 69 IN | WEIGHT: 147 LBS | OXYGEN SATURATION: 98 % | HEART RATE: 79 BPM | BODY MASS INDEX: 21.77 KG/M2 | DIASTOLIC BLOOD PRESSURE: 62 MMHG | TEMPERATURE: 97.9 F

## 2021-05-15 VITALS
HEIGHT: 69 IN | BODY MASS INDEX: 21.51 KG/M2 | DIASTOLIC BLOOD PRESSURE: 60 MMHG | HEART RATE: 83 BPM | SYSTOLIC BLOOD PRESSURE: 115 MMHG | WEIGHT: 145.25 LBS

## 2021-05-16 VITALS
RESPIRATION RATE: 16 BRPM | WEIGHT: 144.12 LBS | TEMPERATURE: 98.4 F | HEIGHT: 70 IN | OXYGEN SATURATION: 100 % | HEART RATE: 79 BPM | SYSTOLIC BLOOD PRESSURE: 98 MMHG | BODY MASS INDEX: 20.63 KG/M2 | DIASTOLIC BLOOD PRESSURE: 68 MMHG | SYSTOLIC BLOOD PRESSURE: 102 MMHG | DIASTOLIC BLOOD PRESSURE: 78 MMHG

## 2021-05-16 VITALS
WEIGHT: 157.12 LBS | HEART RATE: 89 BPM | TEMPERATURE: 98 F | DIASTOLIC BLOOD PRESSURE: 64 MMHG | HEIGHT: 70 IN | SYSTOLIC BLOOD PRESSURE: 104 MMHG | BODY MASS INDEX: 22.49 KG/M2 | OXYGEN SATURATION: 98 %

## 2021-05-16 VITALS — WEIGHT: 154 LBS | HEART RATE: 77 BPM | BODY MASS INDEX: 22.05 KG/M2 | OXYGEN SATURATION: 97 % | HEIGHT: 70 IN

## 2021-05-16 VITALS
SYSTOLIC BLOOD PRESSURE: 110 MMHG | DIASTOLIC BLOOD PRESSURE: 64 MMHG | BODY MASS INDEX: 22.05 KG/M2 | TEMPERATURE: 98.1 F | HEIGHT: 70 IN | HEART RATE: 85 BPM | WEIGHT: 154 LBS | OXYGEN SATURATION: 96 %

## 2021-05-16 VITALS
HEART RATE: 93 BPM | TEMPERATURE: 98.2 F | BODY MASS INDEX: 21.35 KG/M2 | RESPIRATION RATE: 16 BRPM | OXYGEN SATURATION: 98 % | SYSTOLIC BLOOD PRESSURE: 110 MMHG | HEIGHT: 70 IN | WEIGHT: 149.12 LBS | DIASTOLIC BLOOD PRESSURE: 78 MMHG

## 2021-05-16 VITALS
HEART RATE: 79 BPM | HEIGHT: 70 IN | OXYGEN SATURATION: 97 % | WEIGHT: 153.25 LBS | TEMPERATURE: 98.2 F | BODY MASS INDEX: 21.94 KG/M2 | DIASTOLIC BLOOD PRESSURE: 72 MMHG | SYSTOLIC BLOOD PRESSURE: 122 MMHG

## 2021-05-16 VITALS — WEIGHT: 154 LBS | BODY MASS INDEX: 22.05 KG/M2 | OXYGEN SATURATION: 96 % | HEIGHT: 70 IN | HEART RATE: 78 BPM

## 2021-08-27 ENCOUNTER — APPOINTMENT (OUTPATIENT)
Dept: GENERAL RADIOLOGY | Facility: HOSPITAL | Age: 61
End: 2021-08-27

## 2021-08-27 ENCOUNTER — HOSPITAL ENCOUNTER (EMERGENCY)
Facility: HOSPITAL | Age: 61
Discharge: HOME | End: 2021-08-27
Attending: EMERGENCY MEDICINE | Admitting: EMERGENCY MEDICINE

## 2021-08-27 VITALS
DIASTOLIC BLOOD PRESSURE: 70 MMHG | SYSTOLIC BLOOD PRESSURE: 108 MMHG | WEIGHT: 146.39 LBS | HEART RATE: 80 BPM | HEIGHT: 69 IN | BODY MASS INDEX: 21.68 KG/M2 | TEMPERATURE: 98.1 F | OXYGEN SATURATION: 95 % | RESPIRATION RATE: 14 BRPM

## 2021-08-27 DIAGNOSIS — S42.292A OTHER CLOSED DISPLACED FRACTURE OF PROXIMAL END OF LEFT HUMERUS, INITIAL ENCOUNTER: ICD-10-CM

## 2021-08-27 DIAGNOSIS — M79.672 ACUTE PAIN OF LEFT FOOT: ICD-10-CM

## 2021-08-27 DIAGNOSIS — T73.3XXA EXHAUSTION DUE TO EXCESSIVE EXERTION, INITIAL ENCOUNTER: Primary | ICD-10-CM

## 2021-08-27 LAB
ALBUMIN SERPL-MCNC: 4.4 G/DL (ref 3.5–5.2)
ALBUMIN/GLOB SERPL: 1.8 G/DL
ALP SERPL-CCNC: 81 U/L (ref 39–117)
ALT SERPL W P-5'-P-CCNC: 15 U/L (ref 1–33)
ANION GAP SERPL CALCULATED.3IONS-SCNC: 8.6 MMOL/L (ref 5–15)
AST SERPL-CCNC: 14 U/L (ref 1–32)
BASOPHILS # BLD AUTO: 0.04 10*3/MM3 (ref 0–0.2)
BASOPHILS NFR BLD AUTO: 0.4 % (ref 0–1.5)
BILIRUB SERPL-MCNC: 0.5 MG/DL (ref 0–1.2)
BUN SERPL-MCNC: 14 MG/DL (ref 6–20)
BUN/CREAT SERPL: 20 (ref 7–25)
CALCIUM SPEC-SCNC: 9.1 MG/DL (ref 8.6–10.5)
CHLORIDE SERPL-SCNC: 104 MMOL/L (ref 98–107)
CO2 SERPL-SCNC: 25.4 MMOL/L (ref 22–29)
CREAT SERPL-MCNC: 0.7 MG/DL (ref 0.57–1)
DEPRECATED RDW RBC AUTO: 42.3 FL (ref 37–54)
EOSINOPHIL # BLD AUTO: 0.02 10*3/MM3 (ref 0–0.4)
EOSINOPHIL NFR BLD AUTO: 0.2 % (ref 0.3–6.2)
ERYTHROCYTE [DISTWIDTH] IN BLOOD BY AUTOMATED COUNT: 12.9 % (ref 12.3–15.4)
GFR SERPL CREATININE-BSD FRML MDRD: 86 ML/MIN/1.73
GLOBULIN UR ELPH-MCNC: 2.5 GM/DL
GLUCOSE SERPL-MCNC: 117 MG/DL (ref 65–99)
HCT VFR BLD AUTO: 38.5 % (ref 34–46.6)
HGB BLD-MCNC: 12.9 G/DL (ref 12–15.9)
IMM GRANULOCYTES # BLD AUTO: 0.04 10*3/MM3 (ref 0–0.05)
IMM GRANULOCYTES NFR BLD AUTO: 0.4 % (ref 0–0.5)
INR PPP: 1 (ref 2–3)
LYMPHOCYTES # BLD AUTO: 0.81 10*3/MM3 (ref 0.7–3.1)
LYMPHOCYTES NFR BLD AUTO: 7.6 % (ref 19.6–45.3)
MCH RBC QN AUTO: 29.9 PG (ref 26.6–33)
MCHC RBC AUTO-ENTMCNC: 33.5 G/DL (ref 31.5–35.7)
MCV RBC AUTO: 89.1 FL (ref 79–97)
MONOCYTES # BLD AUTO: 0.7 10*3/MM3 (ref 0.1–0.9)
MONOCYTES NFR BLD AUTO: 6.6 % (ref 5–12)
NEUTROPHILS NFR BLD AUTO: 84.8 % (ref 42.7–76)
NEUTROPHILS NFR BLD AUTO: 9.03 10*3/MM3 (ref 1.7–7)
NRBC BLD AUTO-RTO: 0 /100 WBC (ref 0–0.2)
PLATELET # BLD AUTO: 206 10*3/MM3 (ref 140–450)
PMV BLD AUTO: 10.3 FL (ref 6–12)
POTASSIUM SERPL-SCNC: 4 MMOL/L (ref 3.5–5.2)
PROT SERPL-MCNC: 6.9 G/DL (ref 6–8.5)
PROTHROMBIN TIME: 11 SECONDS (ref 9.4–12)
QT INTERVAL: 368 MS
RBC # BLD AUTO: 4.32 10*6/MM3 (ref 3.77–5.28)
SODIUM SERPL-SCNC: 138 MMOL/L (ref 136–145)
TROPONIN T SERPL-MCNC: <0.01 NG/ML (ref 0–0.03)
WBC # BLD AUTO: 10.64 10*3/MM3 (ref 3.4–10.8)

## 2021-08-27 PROCEDURE — 99284 EMERGENCY DEPT VISIT MOD MDM: CPT

## 2021-08-27 PROCEDURE — 85025 COMPLETE CBC W/AUTO DIFF WBC: CPT | Performed by: PHYSICIAN ASSISTANT

## 2021-08-27 PROCEDURE — 73630 X-RAY EXAM OF FOOT: CPT

## 2021-08-27 PROCEDURE — 73030 X-RAY EXAM OF SHOULDER: CPT

## 2021-08-27 PROCEDURE — 85610 PROTHROMBIN TIME: CPT | Performed by: PHYSICIAN ASSISTANT

## 2021-08-27 PROCEDURE — 93005 ELECTROCARDIOGRAM TRACING: CPT | Performed by: PHYSICIAN ASSISTANT

## 2021-08-27 PROCEDURE — 84484 ASSAY OF TROPONIN QUANT: CPT | Performed by: PHYSICIAN ASSISTANT

## 2021-08-27 PROCEDURE — 80053 COMPREHEN METABOLIC PANEL: CPT | Performed by: PHYSICIAN ASSISTANT

## 2021-08-27 RX ORDER — BUSPIRONE HYDROCHLORIDE 5 MG/1
5 TABLET ORAL 3 TIMES DAILY
COMMUNITY
End: 2021-10-15 | Stop reason: SDUPTHER

## 2021-08-27 RX ORDER — OXYCODONE HYDROCHLORIDE AND ACETAMINOPHEN 5; 325 MG/1; MG/1
1 TABLET ORAL EVERY 6 HOURS PRN
Qty: 15 TABLET | Refills: 0 | Status: SHIPPED | OUTPATIENT
Start: 2021-08-27 | End: 2021-09-02 | Stop reason: HOSPADM

## 2021-08-27 RX ORDER — OXYBUTYNIN CHLORIDE 10 MG/1
10 TABLET, EXTENDED RELEASE ORAL DAILY
COMMUNITY
End: 2021-10-15 | Stop reason: SDUPTHER

## 2021-08-27 RX ORDER — SODIUM CHLORIDE 0.9 % (FLUSH) 0.9 %
10 SYRINGE (ML) INJECTION AS NEEDED
Status: DISCONTINUED | OUTPATIENT
Start: 2021-08-27 | End: 2021-08-27 | Stop reason: HOSPADM

## 2021-08-27 RX ORDER — OXYCODONE HYDROCHLORIDE AND ACETAMINOPHEN 5; 325 MG/1; MG/1
1 TABLET ORAL ONCE
Status: COMPLETED | OUTPATIENT
Start: 2021-08-27 | End: 2021-08-27

## 2021-08-27 RX ORDER — DICLOFENAC SODIUM AND MISOPROSTOL 75; 200 MG/1; UG/1
1 TABLET, DELAYED RELEASE ORAL 2 TIMES DAILY
COMMUNITY
End: 2021-09-02 | Stop reason: HOSPADM

## 2021-08-27 RX ADMIN — OXYCODONE HYDROCHLORIDE AND ACETAMINOPHEN 1 TABLET: 5; 325 TABLET ORAL at 12:37

## 2021-08-30 ENCOUNTER — TELEPHONE (OUTPATIENT)
Dept: ORTHOPEDIC SURGERY | Facility: CLINIC | Age: 61
End: 2021-08-30

## 2021-08-31 ENCOUNTER — OFFICE VISIT (OUTPATIENT)
Dept: ORTHOPEDIC SURGERY | Facility: CLINIC | Age: 61
End: 2021-08-31

## 2021-08-31 ENCOUNTER — PREP FOR SURGERY (OUTPATIENT)
Dept: OTHER | Facility: HOSPITAL | Age: 61
End: 2021-08-31

## 2021-08-31 ENCOUNTER — LAB (OUTPATIENT)
Dept: LAB | Facility: HOSPITAL | Age: 61
End: 2021-08-31

## 2021-08-31 VITALS — OXYGEN SATURATION: 97 % | HEIGHT: 69 IN | BODY MASS INDEX: 21.62 KG/M2 | HEART RATE: 81 BPM | WEIGHT: 146 LBS

## 2021-08-31 DIAGNOSIS — S42.202A CLOSED FRACTURE OF PROXIMAL END OF LEFT HUMERUS, UNSPECIFIED FRACTURE MORPHOLOGY, INITIAL ENCOUNTER: Primary | ICD-10-CM

## 2021-08-31 DIAGNOSIS — M25.522 LEFT ELBOW PAIN: ICD-10-CM

## 2021-08-31 DIAGNOSIS — Z01.818 PREOP TESTING: Primary | ICD-10-CM

## 2021-08-31 DIAGNOSIS — S42.292A OTHER CLOSED DISPLACED FRACTURE OF PROXIMAL END OF LEFT HUMERUS, INITIAL ENCOUNTER: Primary | ICD-10-CM

## 2021-08-31 PROCEDURE — U0003 INFECTIOUS AGENT DETECTION BY NUCLEIC ACID (DNA OR RNA); SEVERE ACUTE RESPIRATORY SYNDROME CORONAVIRUS 2 (SARS-COV-2) (CORONAVIRUS DISEASE [COVID-19]), AMPLIFIED PROBE TECHNIQUE, MAKING USE OF HIGH THROUGHPUT TECHNOLOGIES AS DESCRIBED BY CMS-2020-01-R: HCPCS

## 2021-08-31 PROCEDURE — C9803 HOPD COVID-19 SPEC COLLECT: HCPCS

## 2021-08-31 PROCEDURE — 99204 OFFICE O/P NEW MOD 45 MIN: CPT | Performed by: STUDENT IN AN ORGANIZED HEALTH CARE EDUCATION/TRAINING PROGRAM

## 2021-08-31 RX ORDER — CEFAZOLIN SODIUM IN 0.9 % NACL 3 G/100 ML
3 INTRAVENOUS SOLUTION, PIGGYBACK (ML) INTRAVENOUS ONCE
Status: CANCELLED | OUTPATIENT
Start: 2021-08-31 | End: 2021-08-31

## 2021-08-31 RX ORDER — CEFAZOLIN SODIUM 2 G/100ML
2 INJECTION, SOLUTION INTRAVENOUS ONCE
Status: CANCELLED | OUTPATIENT
Start: 2021-08-31 | End: 2021-08-31

## 2021-08-31 NOTE — PROGRESS NOTES
Chief Complaint  Pain of the Left Shoulder    Subjective          Shahida Arroyo presents to [facility] for   History of Present Illness    Shahida presents today for an evaluation of left shoulder pain. States she was holding her grandbaby at 3am on 8/27/21 when she stood up and fell. She owns a  and states she is a very active individual who is always on the go. Reports it occurred from exhaustion. She went to ER the following day when she realized her arm did not look normal and pain had intinsified.  X-rays obtained at the [facility] ED revealed a displaced proximal humeral shaft fracture.  She was placed into a sling and instructed to follow-up my office.  She feels like her swelling is beginning to resolve.  The majority of her pain is localized over the proximal humeral shaft but radiates to the elbow at times.  Denies any wounds secondary to the fall.  She denies any other injuries.  She is left hand dominant.  She denies any numbness.  She is a non-smoker and nondiabetic.  She denies any history of DVT.    No Known Allergies     Social History     Socioeconomic History   • Marital status:      Spouse name: Not on file   • Number of children: Not on file   • Years of education: Not on file   • Highest education level: Not on file   Tobacco Use   • Smoking status: Never Smoker   • Smokeless tobacco: Never Used   Vaping Use   • Vaping Use: Never used   Substance and Sexual Activity   • Alcohol use: Never     Alcohol/week: 1.0 standard drinks     Types: 1 Glasses of wine per week   • Drug use: Never   • Sexual activity: Not Currently        I reviewed the patient's chief complaint, history of present illness, review of systems, past medical history, surgical history, family history, social history, medications, and allergy list.     REVIEW OF SYSTEMS    Constitutional: Denies fevers, chills, weight loss  Cardiovascular: Denies chest pain, shortness of breath  Skin: Denies  "rashes, acute skin changes  Neurologic: Denies headache, loss of consciousness  MSK: Left shoulder pain      Objective   Vital Signs:   Pulse 81   Ht 175.3 cm (69\")   Wt 66.2 kg (146 lb)   SpO2 97%   BMI 21.56 kg/m²     Body mass index is 21.56 kg/m².    Physical Exam    General: Alert, no acute distress  Cardiac: Intact peripheral pulses  Pulmonary: Nonlabored respirations on room air  Left upper extremity: Moderate diffuse swelling over the humeral shaft.  Skin intact.  Nontender about the clavicle or AC joint.  Point tender to palpation over the humeral shaft.  Palpable bony deformity.  Bruising extending down arm.  Tender to palpation over the medial epicondyle.  Nontender over the lateral epicondyle, radial head, olecranon.  Nontender over the forearm, wrist, hand.  Tolerates gentle passive elbow motion with minimal pain.  Sensation intact in the axillary, median, radial, ulnar nerve distributions. Active motor function in the AIN, PIN, Ulnar nerve distributions. Palpable radial pulse.     Procedures    Imaging Results (Most Recent)     Procedure Component Value Units Date/Time    XR ELBOW 2 VIEW LEFT [[id number]] Resulted: 08/31/21 1343     Updated: 08/31/21 1345    Narrative:      Indications: Left elbow pain.  Humerus fracture.    Views: AP and lateral left elbow    Findings: AP and attempted lateral views of the left elbow obtained and   reviewed.  No fractures are noted.  Ulnohumeral and radiocapitellar joints   appear well aligned.  No arthritic changes seen.  The distal portion of   the humerus fracture is partially visualized.    Comparative Data: Comparative data found and reviewed today              Assessment and Plan    Diagnoses and all orders for this visit:    1. Closed fracture of proximal end of left humerus, unspecified fracture morphology, initial encounter (Primary)    2. Left elbow pain  -     XR ELBOW 2 VIEW LEFT        Shahida has a left proximal humeral shaft fracture. We discussed " operative vs. nonoperative management.  Given her fracture location and displacement we discussed surgical management.  We specifically discussed open reduction internal fixation of the left humerus.  We discussed the primary benefits of surgery including improved bone alignment and fracture stability.  We discussed surgical risks including bleeding, infection, damage to nerves and blood vessels, hardware complications, nonunion, malunion, persistent pain, stiffness, anesthesia risk including mortality, DVT/PE, and need for additional procedures.  Discussed recovery process and anticipated timeline. Patient expressed understanding and wished to proceed.  Discussed ice, elevation, nonweightbearing, and sling wear prior to surgery.    Scribed for Narciso Duque MD by Zahra Vail MA.  08/31/21   08:17 EDT    Follow Up   Return for Surgical fixation .  Patient was given instructions and counseling regarding her condition or for health maintenance advice. Please see specific information pulled into the AVS if appropriate.

## 2021-09-02 ENCOUNTER — ANESTHESIA (OUTPATIENT)
Dept: PERIOP | Facility: HOSPITAL | Age: 61
End: 2021-09-02

## 2021-09-02 ENCOUNTER — HOSPITAL ENCOUNTER (OUTPATIENT)
Facility: HOSPITAL | Age: 61
Setting detail: HOSPITAL OUTPATIENT SURGERY
Discharge: HOME | End: 2021-09-02
Attending: STUDENT IN AN ORGANIZED HEALTH CARE EDUCATION/TRAINING PROGRAM | Admitting: STUDENT IN AN ORGANIZED HEALTH CARE EDUCATION/TRAINING PROGRAM

## 2021-09-02 ENCOUNTER — ANESTHESIA EVENT (OUTPATIENT)
Dept: PERIOP | Facility: HOSPITAL | Age: 61
End: 2021-09-02

## 2021-09-02 ENCOUNTER — APPOINTMENT (OUTPATIENT)
Dept: GENERAL RADIOLOGY | Facility: HOSPITAL | Age: 61
End: 2021-09-02

## 2021-09-02 VITALS
TEMPERATURE: 97.3 F | WEIGHT: 143.74 LBS | BODY MASS INDEX: 21.29 KG/M2 | SYSTOLIC BLOOD PRESSURE: 114 MMHG | HEIGHT: 69 IN | HEART RATE: 78 BPM | RESPIRATION RATE: 16 BRPM | DIASTOLIC BLOOD PRESSURE: 63 MMHG | OXYGEN SATURATION: 97 %

## 2021-09-02 DIAGNOSIS — S42.292A OTHER CLOSED DISPLACED FRACTURE OF PROXIMAL END OF LEFT HUMERUS, INITIAL ENCOUNTER: ICD-10-CM

## 2021-09-02 LAB — SARS-COV-2 RNA RESP QL NAA+PROBE: NOT DETECTED

## 2021-09-02 PROCEDURE — 25010000002 MIDAZOLAM PER 1MG: Performed by: ANESTHESIOLOGY

## 2021-09-02 PROCEDURE — C1713 ANCHOR/SCREW BN/BN,TIS/BN: HCPCS | Performed by: STUDENT IN AN ORGANIZED HEALTH CARE EDUCATION/TRAINING PROGRAM

## 2021-09-02 PROCEDURE — 24515 OPTX HUMRL SHFT FX PLATE/SCR: CPT | Performed by: STUDENT IN AN ORGANIZED HEALTH CARE EDUCATION/TRAINING PROGRAM

## 2021-09-02 PROCEDURE — 25010000002 ONDANSETRON PER 1 MG: Performed by: ANESTHESIOLOGY

## 2021-09-02 PROCEDURE — 76000 FLUOROSCOPY <1 HR PHYS/QHP: CPT

## 2021-09-02 PROCEDURE — 25010000002 HYDROMORPHONE 1 MG/ML SOLUTION: Performed by: ANESTHESIOLOGY

## 2021-09-02 PROCEDURE — 25010000002 HYDROMORPHONE PER 4 MG: Performed by: ANESTHESIOLOGY

## 2021-09-02 PROCEDURE — 25010000003 CEFAZOLIN IN DEXTROSE 2-4 GM/100ML-% SOLUTION: Performed by: STUDENT IN AN ORGANIZED HEALTH CARE EDUCATION/TRAINING PROGRAM

## 2021-09-02 PROCEDURE — 25010000002 PROPOFOL 10 MG/ML EMULSION: Performed by: ANESTHESIOLOGY

## 2021-09-02 PROCEDURE — 25010000002 FENTANYL CITRATE (PF) 50 MCG/ML SOLUTION: Performed by: ANESTHESIOLOGY

## 2021-09-02 PROCEDURE — 73060 X-RAY EXAM OF HUMERUS: CPT

## 2021-09-02 PROCEDURE — 25010000002 KETOROLAC TROMETHAMINE PER 15 MG: Performed by: ANESTHESIOLOGY

## 2021-09-02 PROCEDURE — 25010000002 DEXAMETHASONE PER 1 MG: Performed by: ANESTHESIOLOGY

## 2021-09-02 DEVICE — LOCKING SCREW
Type: IMPLANTABLE DEVICE | Site: HUMERUS | Status: FUNCTIONAL
Brand: AXSOS

## 2021-09-02 DEVICE — PROXIMAL LATERAL HUMERUS PLATE, LEFT
Type: IMPLANTABLE DEVICE | Site: HUMERUS | Status: FUNCTIONAL
Brand: AXSOS

## 2021-09-02 DEVICE — CORTEX SCREW
Type: IMPLANTABLE DEVICE | Site: HUMERUS | Status: FUNCTIONAL
Brand: AXSOS

## 2021-09-02 DEVICE — CANCELLOUS SCREW
Type: IMPLANTABLE DEVICE | Site: HUMERUS | Status: FUNCTIONAL
Brand: AXSOS

## 2021-09-02 RX ORDER — SODIUM CHLORIDE, SODIUM LACTATE, POTASSIUM CHLORIDE, CALCIUM CHLORIDE 600; 310; 30; 20 MG/100ML; MG/100ML; MG/100ML; MG/100ML
INJECTION, SOLUTION INTRAVENOUS CONTINUOUS PRN
Status: DISCONTINUED | OUTPATIENT
Start: 2021-09-02 | End: 2021-09-02 | Stop reason: SURG

## 2021-09-02 RX ORDER — LABETALOL HYDROCHLORIDE 5 MG/ML
5 INJECTION, SOLUTION INTRAVENOUS
Status: DISCONTINUED | OUTPATIENT
Start: 2021-09-02 | End: 2021-09-02 | Stop reason: HOSPADM

## 2021-09-02 RX ORDER — DOCUSATE SODIUM 100 MG/1
100 CAPSULE, LIQUID FILLED ORAL 2 TIMES DAILY PRN
Qty: 20 CAPSULE | Refills: 1 | Status: SHIPPED | OUTPATIENT
Start: 2021-09-02 | End: 2022-02-15 | Stop reason: HOSPADM

## 2021-09-02 RX ORDER — SODIUM CHLORIDE, SODIUM LACTATE, POTASSIUM CHLORIDE, CALCIUM CHLORIDE 600; 310; 30; 20 MG/100ML; MG/100ML; MG/100ML; MG/100ML
9 INJECTION, SOLUTION INTRAVENOUS CONTINUOUS PRN
Status: DISCONTINUED | OUTPATIENT
Start: 2021-09-02 | End: 2021-09-02 | Stop reason: HOSPADM

## 2021-09-02 RX ORDER — KETOROLAC TROMETHAMINE 30 MG/ML
INJECTION, SOLUTION INTRAMUSCULAR; INTRAVENOUS AS NEEDED
Status: DISCONTINUED | OUTPATIENT
Start: 2021-09-02 | End: 2021-09-02 | Stop reason: SURG

## 2021-09-02 RX ORDER — ACETAMINOPHEN 500 MG
1000 TABLET ORAL ONCE
Status: COMPLETED | OUTPATIENT
Start: 2021-09-02 | End: 2021-09-02

## 2021-09-02 RX ORDER — CEFAZOLIN SODIUM IN 0.9 % NACL 3 G/100 ML
3 INTRAVENOUS SOLUTION, PIGGYBACK (ML) INTRAVENOUS ONCE
Status: DISCONTINUED | OUTPATIENT
Start: 2021-09-02 | End: 2021-09-02

## 2021-09-02 RX ORDER — ONDANSETRON 2 MG/ML
INJECTION INTRAMUSCULAR; INTRAVENOUS AS NEEDED
Status: DISCONTINUED | OUTPATIENT
Start: 2021-09-02 | End: 2021-09-02 | Stop reason: SURG

## 2021-09-02 RX ORDER — DEXAMETHASONE SODIUM PHOSPHATE 4 MG/ML
INJECTION, SOLUTION INTRA-ARTICULAR; INTRALESIONAL; INTRAMUSCULAR; INTRAVENOUS; SOFT TISSUE AS NEEDED
Status: DISCONTINUED | OUTPATIENT
Start: 2021-09-02 | End: 2021-09-02 | Stop reason: SURG

## 2021-09-02 RX ORDER — ONDANSETRON 2 MG/ML
4 INJECTION INTRAMUSCULAR; INTRAVENOUS ONCE AS NEEDED
Status: DISCONTINUED | OUTPATIENT
Start: 2021-09-02 | End: 2021-09-02 | Stop reason: HOSPADM

## 2021-09-02 RX ORDER — MAGNESIUM HYDROXIDE 1200 MG/15ML
LIQUID ORAL AS NEEDED
Status: DISCONTINUED | OUTPATIENT
Start: 2021-09-02 | End: 2021-09-02 | Stop reason: HOSPADM

## 2021-09-02 RX ORDER — ONDANSETRON 4 MG/1
4 TABLET, FILM COATED ORAL EVERY 8 HOURS PRN
Qty: 30 TABLET | Refills: 0 | Status: SHIPPED | OUTPATIENT
Start: 2021-09-02 | End: 2021-10-15 | Stop reason: SDUPTHER

## 2021-09-02 RX ORDER — ACETAMINOPHEN 325 MG/1
650 TABLET ORAL ONCE AS NEEDED
Status: DISCONTINUED | OUTPATIENT
Start: 2021-09-02 | End: 2021-09-02 | Stop reason: HOSPADM

## 2021-09-02 RX ORDER — MIDAZOLAM HYDROCHLORIDE 2 MG/2ML
INJECTION, SOLUTION INTRAMUSCULAR; INTRAVENOUS AS NEEDED
Status: DISCONTINUED | OUTPATIENT
Start: 2021-09-02 | End: 2021-09-02 | Stop reason: SURG

## 2021-09-02 RX ORDER — HYDROMORPHONE HCL 110MG/55ML
PATIENT CONTROLLED ANALGESIA SYRINGE INTRAVENOUS AS NEEDED
Status: DISCONTINUED | OUTPATIENT
Start: 2021-09-02 | End: 2021-09-02 | Stop reason: SURG

## 2021-09-02 RX ORDER — ACETAMINOPHEN 650 MG/1
650 SUPPOSITORY RECTAL ONCE AS NEEDED
Status: DISCONTINUED | OUTPATIENT
Start: 2021-09-02 | End: 2021-09-02 | Stop reason: HOSPADM

## 2021-09-02 RX ORDER — CEFAZOLIN SODIUM 2 G/100ML
2 INJECTION, SOLUTION INTRAVENOUS ONCE
Status: COMPLETED | OUTPATIENT
Start: 2021-09-02 | End: 2021-09-02

## 2021-09-02 RX ORDER — GLYCOPYRROLATE 0.2 MG/ML
0.2 INJECTION INTRAMUSCULAR; INTRAVENOUS
Status: COMPLETED | OUTPATIENT
Start: 2021-09-02 | End: 2021-09-02

## 2021-09-02 RX ORDER — OXYCODONE HYDROCHLORIDE 5 MG/1
5 TABLET ORAL ONCE AS NEEDED
Status: COMPLETED | OUTPATIENT
Start: 2021-09-02 | End: 2021-09-02

## 2021-09-02 RX ORDER — OXYCODONE HYDROCHLORIDE AND ACETAMINOPHEN 5; 325 MG/1; MG/1
1-2 TABLET ORAL EVERY 4 HOURS PRN
Qty: 30 TABLET | Refills: 0 | Status: SHIPPED | OUTPATIENT
Start: 2021-09-02 | End: 2021-09-15

## 2021-09-02 RX ORDER — LIDOCAINE HYDROCHLORIDE 20 MG/ML
INJECTION, SOLUTION INFILTRATION; PERINEURAL AS NEEDED
Status: DISCONTINUED | OUTPATIENT
Start: 2021-09-02 | End: 2021-09-02 | Stop reason: SURG

## 2021-09-02 RX ORDER — MIDAZOLAM HYDROCHLORIDE 2 MG/2ML
2 INJECTION, SOLUTION INTRAMUSCULAR; INTRAVENOUS ONCE
Status: COMPLETED | OUTPATIENT
Start: 2021-09-02 | End: 2021-09-02

## 2021-09-02 RX ORDER — FENTANYL CITRATE 50 UG/ML
INJECTION, SOLUTION INTRAMUSCULAR; INTRAVENOUS AS NEEDED
Status: DISCONTINUED | OUTPATIENT
Start: 2021-09-02 | End: 2021-09-02 | Stop reason: SURG

## 2021-09-02 RX ADMIN — KETOROLAC TROMETHAMINE 30 MG: 30 INJECTION, SOLUTION INTRAMUSCULAR; INTRAVENOUS at 12:09

## 2021-09-02 RX ADMIN — FENTANYL CITRATE 50 MCG: 50 INJECTION INTRAMUSCULAR; INTRAVENOUS at 09:57

## 2021-09-02 RX ADMIN — SODIUM CHLORIDE, POTASSIUM CHLORIDE, SODIUM LACTATE AND CALCIUM CHLORIDE: 600; 310; 30; 20 INJECTION, SOLUTION INTRAVENOUS at 09:55

## 2021-09-02 RX ADMIN — SODIUM CHLORIDE, POTASSIUM CHLORIDE, SODIUM LACTATE AND CALCIUM CHLORIDE: 600; 310; 30; 20 INJECTION, SOLUTION INTRAVENOUS at 10:23

## 2021-09-02 RX ADMIN — ONDANSETRON 8 MG: 2 INJECTION INTRAMUSCULAR; INTRAVENOUS at 12:09

## 2021-09-02 RX ADMIN — FENTANYL CITRATE 50 MCG: 50 INJECTION INTRAMUSCULAR; INTRAVENOUS at 10:27

## 2021-09-02 RX ADMIN — PROPOFOL 120 MCG/KG/MIN: 10 INJECTION, EMULSION INTRAVENOUS at 10:05

## 2021-09-02 RX ADMIN — HYDROMORPHONE HYDROCHLORIDE 0.5 MG: 1 INJECTION, SOLUTION INTRAMUSCULAR; INTRAVENOUS; SUBCUTANEOUS at 12:59

## 2021-09-02 RX ADMIN — HYDROMORPHONE HYDROCHLORIDE 0.4 MG: 2 INJECTION, SOLUTION INTRAMUSCULAR; INTRAVENOUS; SUBCUTANEOUS at 10:29

## 2021-09-02 RX ADMIN — OXYCODONE HYDROCHLORIDE 5 MG: 5 TABLET ORAL at 15:22

## 2021-09-02 RX ADMIN — LIDOCAINE HYDROCHLORIDE 100 MG: 20 INJECTION, SOLUTION INFILTRATION; PERINEURAL at 10:05

## 2021-09-02 RX ADMIN — HYDROMORPHONE HYDROCHLORIDE 0.5 MG: 1 INJECTION, SOLUTION INTRAMUSCULAR; INTRAVENOUS; SUBCUTANEOUS at 12:37

## 2021-09-02 RX ADMIN — GLYCOPYRROLATE 0.2 MG: 0.2 INJECTION INTRAMUSCULAR; INTRAVENOUS at 09:37

## 2021-09-02 RX ADMIN — MIDAZOLAM HYDROCHLORIDE 2 MG: 1 INJECTION, SOLUTION INTRAMUSCULAR; INTRAVENOUS at 09:37

## 2021-09-02 RX ADMIN — ACETAMINOPHEN 1000 MG: 500 TABLET ORAL at 07:55

## 2021-09-02 RX ADMIN — MIDAZOLAM HYDROCHLORIDE 2 MG: 1 INJECTION, SOLUTION INTRAMUSCULAR; INTRAVENOUS at 09:57

## 2021-09-02 RX ADMIN — SODIUM CHLORIDE, POTASSIUM CHLORIDE, SODIUM LACTATE AND CALCIUM CHLORIDE 9 ML/HR: 600; 310; 30; 20 INJECTION, SOLUTION INTRAVENOUS at 07:55

## 2021-09-02 RX ADMIN — DEXAMETHASONE SODIUM PHOSPHATE 8 MG: 4 INJECTION INTRA-ARTICULAR; INTRALESIONAL; INTRAMUSCULAR; INTRAVENOUS; SOFT TISSUE at 10:19

## 2021-09-02 RX ADMIN — CEFAZOLIN SODIUM 2 G: 2 INJECTION, SOLUTION INTRAVENOUS at 09:57

## 2021-09-02 NOTE — ANESTHESIA PREPROCEDURE EVALUATION
Anesthesia Evaluation     Patient summary reviewed and Nursing notes reviewed   no history of anesthetic complications:  NPO Solid Status: > 8 hours  NPO Liquid Status: > 2 hours           Airway   Mallampati: II  TM distance: >3 FB  Neck ROM: full  No difficulty expected  Dental - normal exam   (+) upper dentures    Pulmonary - negative pulmonary ROS and normal exam   Cardiovascular - negative cardio ROS and normal exam  Exercise tolerance: good (4-7 METS)        Neuro/Psych- negative ROS  GI/Hepatic/Renal/Endo    (+)  hiatal hernia,      Musculoskeletal     Abdominal  - normal exam    Bowel sounds: normal.   Substance History - negative use     OB/GYN negative ob/gyn ROS         Other   arthritis,                      Anesthesia Plan    ASA 2     general     intravenous induction     Anesthetic plan, all risks, benefits, and alternatives have been provided, discussed and informed consent has been obtained with: patient.

## 2021-09-02 NOTE — ANESTHESIA POSTPROCEDURE EVALUATION
Patient: Shahida Arroyo    Procedure Summary     Date: 09/02/21 Room / Location: [facility] OR 03 / [facility] MAIN OR    Anesthesia Start: 0956 Anesthesia Stop: 1229    Procedure: HUMERUS OPEN  REDUCTION INTERNAL FIXATION LEFT (Left Arm Upper) Diagnosis:       Other closed displaced fracture of proximal end of left humerus, initial encounter      (Other closed displaced fracture of proximal end of left humerus, initial encounter [S42.292A])    Surgeons: Narciso Duque MD Provider: Hi House MD    Anesthesia Type: general ASA Status: 2          Anesthesia Type: general    Vitals  Vitals Value Taken Time   BP 95/74 09/02/21 1305   Temp 36.1 °C (97 °F) 09/02/21 1225   Pulse 74 09/02/21 1309   Resp 13 09/02/21 1250   SpO2 99 % 09/02/21 1308   Vitals shown include unvalidated device data.        Post Anesthesia Care and Evaluation    Patient location during evaluation: bedside  Patient participation: complete - patient participated  Level of consciousness: awake and responsive to verbal stimuli  Pain score: 2  Pain management: adequate  Airway patency: patent  Anesthetic complications: No anesthetic complications  PONV Status: none  Cardiovascular status: acceptable and stable  Respiratory status: acceptable and nasal cannula  Hydration status: acceptable    Comments: An Anesthesiologist personally participated in the most demanding procedures (including induction and emergence if applicable) in the anesthesia plan, monitored the course of anesthesia administration at frequent intervals and remained physically present and available for immediate diagnosis and treatment of emergencies.

## 2021-09-02 NOTE — INTERVAL H&P NOTE
H&P reviewed. The patient was examined and there are no changes to the H&P.    Electronically signed by Narciso Duque MD, 09/02/21, 7:19 AM EDT.

## 2021-09-02 NOTE — OP NOTE
HUMERUS MID-SHAFT FRACTURE OPEN REDUCTION INTERNAL FIXATION  Procedure Report    Patient Name:  Shahida Arroyo  YOB: 1960    Date of Surgery:  9/2/2021     Indications: Shahida is a 61-year-old left-hand-dominant female who sustained a fall on 8/27/2021.  She landed onto her left upper extremity.  She experienced immediate pain and deformity.  X-rays revealed a fracture of her proximal humeral shaft.  She was placed into a sling and followed in my office for further evaluation.  Given her fracture alignment, hand dominance, and baseline function we discussed treatment options including both operative and nonoperative management.  She elected to proceed with operative management.  We specifically discussed open reduction internal fixation of the left humeral shaft fracture.  We discussed the primary benefits of surgery including improved bone alignment and fracture stability.  We discussed surgical risks including bleeding, infection, damage to nerves and blood vessels, hardware complications, nonunion, malunion, persistent pain, stiffness, DVT/PE, anesthesia risk, and need for additional procedures.  Shahida elected to proceed with surgical management and consent was obtained.       Pre-op Diagnosis:   Other closed displaced fracture of proximal end of left humerus, initial encounter [S42.292A]       Post-Op Diagnosis Codes:     * Other closed displaced fracture of proximal end of left humerus, initial encounter [S42.292A]    Procedure/CPT® Codes:      Procedure(s):  HUMERUS OPEN  REDUCTION INTERNAL FIXATION LEFT    Staff:  Surgeon(s):  Narciso Duque MD    Assistant: Barber Villa    Anesthesia: General    Estimated Blood Loss: 100ml    Implants:    Implant Name Type Inv. Item Serial No.  Lot No. LRB No. Used Action   SCRW PAT AXSOS TI 3.5X30MM - [id number] Implant SCRW PAT AXSOS TI 3.5X30MM  JENIFER [facility]  Left 1 Implanted   4.0 X 28 PARTIAL THREAD CANCELLOUS SCREW    JENIFER  Left 2  Implanted   PLT HUM AXSOS LAT/PROX 12H 202MM LT - [id number] Implant PLT HUM AXSOS LAT/PROX 12H 202MM LT  JENIFER YOLANDE  Left 1 Implanted   SCRW PAT AXSOS TI 3.5X34MM - [id number] Implant SCRW PAT AXSOS TI 3.5X34MM  JENIFER YOLANDE  Left 1 Implanted   SCRW PAT AXSOS TI 3.5X24MM - [id number] Implant SCRW PAT AXSOS TI 3.5X24MM  JENIFER YOLANDE  Left 2 Implanted   SCRW PAT AXSOS TI 3.5X40MM - [id number] Implant SCRW PAT AXSOS TI 3.5X40MM  JENIFER YOLANDE  Left 1 Implanted   SCRW LK AXSOS 4X28MM - [id number] Implant SCRW LK AXSOS 4X28MM  JENIFER YOLANDE  Left 1 Implanted   SCRW LK AXSOS3 S/TAP TI 4X44MM - [id number] Implant SCRW LK AXSOS3 S/TAP TI 4X44MM  JENIFER YOLANDE  Left 1 Implanted   SCRW LK AXSOS 4X36MM - [id number] Implant SCRW LK AXSOS 4X36MM  JENIFER YOLANDE  Left 1 Implanted   SCRW LK AXSOS 4X38MM - [id number] Implant SCRW LK AXSOS 4X38MM  JENIFER YOLANDE  Left 3 Implanted   SCRW PAT AXSOS TI 3.5X20MM - [id number] Implant SCRW PAT AXSOS TI 3.5X20MM  JENIFER YOLANDE  Left 1 Implanted   SCRW LK AXSOS 4X22MM - [id number] Implant SCRW LK AXSOS 4X22MM  JENIFER YOLANDE  Left 1 Implanted       Specimen:          None        Findings: Displaced proximal humeral shaft fracture    Complications: None    Description of Procedure: The patient was met in the preoperative holding area and the operative extremity was marked.  The patient was transported to the operating room and laid supine on the operating room table.  General anesthesia was induced without complication.  2 g of preoperative Ancef were administered.  No tourniquet was used.  The left upper extremity was prepped and draped in the usual sterile fashion.  A timeout was taken to ensure the appropriate patient, procedure, and procedural site.  All were in agreement.  I made an approximately 20 cm incision extending from the coracoid process down to the anterior lateral approach over the humeral shaft.  Sharp dissection was carried down through the skin.  The subcutaneous tissues  were dissected with Bovie.  The deltopectoral interval was identified proximally.  The cephalic vein was dissected and retracted laterally with the deltoid.  I used a Mancilla elevator to dissect into the subdeltoid bursa and a deltoid retractor was inserted.  I continued my dissection distally.  The biceps was retracted medially.  The fracture site was identified at the level of the deltoid insertion.  The anterior portion of the deltoid insertion was sharply elevated.  The pectoralis major tendon was partially elevated to improve visualization.  My dissection carried distally and I split the brachialis muscle belly through its midline.  I elevated the soft tissue to expose the humeral shaft.  The fracture site was identified.  It was predominantly long and spiral in nature through the proximal humeral shaft.  There were areas of comminution around the periphery.  The fracture site was irrigated and debrided of hematoma and periosteal tissue.  I then utilized two lobster-claw type reduction clamps in addition with traction and direct manipulation of the fracture fragments to reduce the fracture.  The distal fracture line appeared near anatomic.  Reduction was confirmed with C-arm fluoroscopy.  I was satisfied with the reduction.  I then placed two 3.5 mm cortical screws in lag fashion and one 4.0 mm partially-threaded cancellous screw across the fracture in multiple planes to compress and maintain the reduction.  This held the fracture reduced adequately.  I then selected a [employer] proximal humerus plate.  The 12 hole plate seemed to fit appropriately and allow for fixation distal to the fracture line.  The plate was temporarily K wired into position.  Plate positioning was confirmed with C arm and was satisfactory.  I secured the plate to bone with 1 bicortical nonlocking screw distal to the fracture site.  I then placed a bicortical nonlocking screw proximal to the fracture site along the calcar.  This secured the  plate to bone nicely.  I then placed multiple unicortical locking screws into the humeral head utilizing the locking guide.  I moved distally and placed 2 additional bicortical nonlocking screws into the humeral shaft.  I filled the final diaphyseal hole distal to the fracture line with a bicortical locking screw.  Final images were then obtained using C-arm fluoroscopy.  Multiple around the world type views of the proximal humerus were obtained.  All locking screws appeared contained within the humeral head.  No hardware complications are noted distally and the reduction appeared satisfactory and well-maintained.  The wound was then thoroughly irrigated with irrisept solution followed by normal saline.  Adequate hemostasis was obtained.  The deltoid insertion and elevated portion of the pectoralis major tendon were sutured down to the plate using 0 Vicryl.  Deep tissues and the deltopectoral interval were closed with 0 Vicryl.  Subcutaneous tissues were closed with 0 Vicryl and 2-0 Vicryl.  Skin was closed with skin staples.  The wound was dressed with an Aquacel dressing followed by an Ace wrap.  The patient had a palpable radial pulse and well-perfused hand at the conclusion of the case.  She was placed into a sling.  All surgical counts were correct.  She awoke from anesthesia without complication and was transferred to the recovery room in stable condition.    Assistant: Barber Villa  was responsible for performing the following activities: Retraction, Suction, Irrigation and Placing Dressing and their skilled assistance was necessary for the success of this case.    Narciso Duque MD     Date: 9/2/2021  Time: 12:33 EDT

## 2021-09-02 NOTE — DISCHARGE INSTRUCTIONS
DISCHARGE INSTRUCTIONS  ORTHOPEDICS      ? For your surgery you had:  ? General anesthesia (you may have a sore throat for the first 24 hours)  ? IV sedation.  ? Local anesthesia  ? Monitored anesthesia care  ? You received a medicated patch for nausea prevention today (behind the ear). It is recommended that you remove it 24-48 hours post-operatively. It must be removed within 72 hours.   ? You received an anesthesia medication today that can cause hormonal forms of birth control to be ineffective. You should use a different form of birth control (to prevent pregnancy) for 7 days.   ? You may experience dizziness, drowsiness, or light-headedness for several hours following surgery  ? Do not stay alone today or tonight.  ? Limit your activity for 24 hours.  ? Resume your diet slowly.  Follow whatever special dietary instructions you may have been given by the doctor.  ? You should not drive or operate machinery or drink alcohol for 24 hours or while you are taking pain medication.  ? You should not sign any legally binding documents.  ? If you had an axillary or regional block, you will not have control of the involved limb for up to 12 hours.  Protect the arm with a sling or follow your physician's specific instructions.  ? You may remove dressing:  [] in 24 hours  [] in 48 hours  [] Other:    ? You may shower or bathe:    ? Sleep with the injured part elevated on a pillow.  ? Medications per physician's instructions as indicated on Discharge Medication Information Sheet.  ? Follow verbal instructions of your doctor.  ? Carry the upper arm in a sling so that the hand and wrist are above the level of the heart.  ? Sit with the lower leg propped up on a footstool or chair with pillows.  ? Exercise fingers or toes for 10 minutes every hour while awake. ? Ice bag to injured area for 72 hours.  Apply 20 minutes on - 20 minutes off.  Never place ice directly on skin or cast.    Avoid getting cast or dressing wet.  ? The  Cold Therapy System can help reduce swelling and decrease pain.  Utilize device for 30-60 minutes per session, with 30-60 minute breaks in between sessions.  It is recommended to use, as directed, for the first 72 hours after surgery until bedtime.  After 72 hours, continue using the device as needed until your follow-up appointment with your physician.  Never place directly on skin.  Please refer to the instruction sheet given.  ? In addition to these instructions, follow the discharge instructions on postoperative arthroscopic surgery.  SPECIAL INSTRUCTIONS:           Last dose of pain medication was given at:    NOTIFY THE PHYSICIAN IF YOU EXPERIENCE:  1. Numbness of fingers or toes.  2. Inability to move fingers or toes.  3. Extreme coldness, paleness or blue dis-coloration of fingers or toes.  4. Excessive swelling of affected surgical site or swelling that causes the cast to rub or cut into skin.  5. Pain unrelieved by pain medication  6. Nausea/vomiting not relieved by prescribed medication  7. Unable to urinate in 6 hours after surgery  8. Temperature greater than 101 degree Fahrenheit or chills  9. If unable to reach your doctor, please go to the closest emergency room  You should see   for follow-up care   on   .   Phone number:          Orthopedic Instructions: Nonweightbearing with the operative extremity.  Continue to use your sling.  You may remove 3 times daily for gentle elbow range of motion exercises.  No reaching, pushing, pulling.  You may remove your Ace wrap on postoperative day 2.  You may shower over top of your Aquacel dressing.  Keep your Aquacel dressing on for 5 days.  Remove and begin daily dry dressing changes after that time.  Do not soak or scrub your incision.  Ice and elevate to help reduce pain and swelling.  Monitor for increasing pain, severe swelling, drainage through your incision, redness, fevers, chills.  Call the office with any concerns.  Follow-up in 2 weeks for  reevaluation.

## 2021-09-15 ENCOUNTER — OFFICE VISIT (OUTPATIENT)
Dept: ORTHOPEDIC SURGERY | Facility: CLINIC | Age: 61
End: 2021-09-15

## 2021-09-15 VITALS — BODY MASS INDEX: 20.88 KG/M2 | HEIGHT: 69 IN | HEART RATE: 87 BPM | OXYGEN SATURATION: 98 % | WEIGHT: 141 LBS

## 2021-09-15 DIAGNOSIS — S42.292A OTHER CLOSED DISPLACED FRACTURE OF PROXIMAL END OF LEFT HUMERUS, INITIAL ENCOUNTER: ICD-10-CM

## 2021-09-15 DIAGNOSIS — S42.202D CLOSED FRACTURE OF PROXIMAL END OF LEFT HUMERUS WITH ROUTINE HEALING, UNSPECIFIED FRACTURE MORPHOLOGY, SUBSEQUENT ENCOUNTER: Primary | ICD-10-CM

## 2021-09-15 PROCEDURE — 99024 POSTOP FOLLOW-UP VISIT: CPT | Performed by: STUDENT IN AN ORGANIZED HEALTH CARE EDUCATION/TRAINING PROGRAM

## 2021-09-15 RX ORDER — OXYCODONE HYDROCHLORIDE AND ACETAMINOPHEN 5; 325 MG/1; MG/1
1 TABLET ORAL EVERY 4 HOURS PRN
Qty: 30 TABLET | Refills: 0 | Status: ON HOLD | OUTPATIENT
Start: 2021-09-15 | End: 2022-02-15 | Stop reason: SDUPTHER

## 2021-09-15 NOTE — PROGRESS NOTES
"Chief Complaint  Pain of the Left Upper Arm    Subjective          Shahida Arroyo presents to [facility] for   History of Present Illness    Shahida returns for follow-up of her left upper extremity. She has a left proximal humeral shaft fracture. She is 13 days status post open reduction internal fixation.  She has done well overall after surgery.  Her swelling has been improving.  She denies numbness.  She denies drainage from her incision.  She is decreasing her narcotic pain medications.  She attempted to return to work but was a unable to complete her duties one-handed and had pain with prolonged activity.  She denies any new falls or injuries.    No Known Allergies     Social History     Socioeconomic History   • Marital status:      Spouse name: Not on file   • Number of children: Not on file   • Years of education: Not on file   • Highest education level: Not on file   Tobacco Use   • Smoking status: Never Smoker   • Smokeless tobacco: Never Used   Vaping Use   • Vaping Use: Never used   Substance and Sexual Activity   • Alcohol use: Never     Alcohol/week: 1.0 standard drinks     Types: 1 Glasses of wine per week   • Drug use: Never   • Sexual activity: Not Currently        I reviewed the patient's chief complaint, history of present illness, review of systems, past medical history, surgical history, family history, social history, medications, and allergy list.     REVIEW OF SYSTEMS    Constitutional: Denies fevers, chills, weight loss  Cardiovascular: Denies chest pain, shortness of breath  Skin: Denies rashes, acute skin changes  Neurologic: Denies headache, loss of consciousness  MSK: left upper extremity pain      Objective   Vital Signs:   Pulse 87   Ht 175.3 cm (69\")   Wt 64 kg (141 lb)   SpO2 98%   BMI 20.82 kg/m²     Body mass index is 20.82 kg/m².    Physical Exam    General: Alert, no acute distress  Left upper extremity: Staples removed.  Healing " deltopectoral incision with extension down the anterior arm.  No active drainage.  Resolving bruising and swelling. No areas of redness. Sensation intact in the axillary, median, radial, ulnar nerve distributions. Palpable radial pulse. Active motor function in the AIN, PIN, Ulnar nerve distributions.  Stiff with active elbow and wrist motion.  Shoulder motion deferred today.    Procedures    Imaging Results (Most Recent)     Procedure Component Value Units Date/Time    XR Humerus Left [[id number]] Resulted: 09/15/21 1440     Updated: 09/15/21 1441    Narrative:      Indications: Follow-up left humerus fracture    Views: AP and lateral left humerus    Findings: Left proximal humeral shaft fracture again seen.  Fracture   fixation with 3 interfragmentary screws and a bridging plate/screw   construct appears stable compared to intraoperative films.  No hardware   complications are noted.  Anterior comminution along the humeral shaft   again seen.    Comparative Data: Comparative data found and reviewed today                Assessment and Plan    Diagnoses and all orders for this visit:    1. Closed fracture of proximal end of left humerus with routine healing, unspecified fracture morphology, subsequent encounter (Primary)  -     XR Humerus Left    2. Other closed displaced fracture of proximal end of left humerus, initial encounter  -     oxyCODONE-acetaminophen (PERCOCET) 5-325 MG per tablet; Take 1 tablet by mouth Every 4 (Four) Hours As Needed for Severe Pain .  Dispense: 30 tablet; Refill: 0      Shahida is 13 days status post ORIF of left humerus.  Her incision is healing appropriately and x-rays are stable today.  Educated on no reaching or lifting activites.  We discussed range of motion exercises including finger, wrist, elbow, and pendulum shoulder exercises only.  She will remain nonweightbearing.  She will continue her sling except for her range of motion activities.  Discussed incision care.  Patient  expressed understanding.  Percocet was refilled today. Advised to call or return if worsening symptoms.  Follow up in 2 weeks to reevaluate. Will obtain new xrays of left humerus next visit.     Scribed for Narciso Duque MD by Zahra Vail MA.  09/15/21   08:33 EDT    Follow Up   Return in about 2 weeks (around 9/29/2021).  Patient was given instructions and counseling regarding her condition or for health maintenance advice. Please see specific information pulled into the AVS if appropriate.

## 2021-09-29 ENCOUNTER — OFFICE VISIT (OUTPATIENT)
Dept: ORTHOPEDIC SURGERY | Facility: CLINIC | Age: 61
End: 2021-09-29

## 2021-09-29 VITALS — WEIGHT: 143 LBS | HEART RATE: 99 BPM | HEIGHT: 69 IN | BODY MASS INDEX: 21.18 KG/M2 | OXYGEN SATURATION: 99 %

## 2021-09-29 DIAGNOSIS — S42.202D CLOSED FRACTURE OF PROXIMAL END OF LEFT HUMERUS WITH ROUTINE HEALING, UNSPECIFIED FRACTURE MORPHOLOGY, SUBSEQUENT ENCOUNTER: Primary | ICD-10-CM

## 2021-09-29 DIAGNOSIS — M79.602 LEFT ARM PAIN: ICD-10-CM

## 2021-09-29 PROCEDURE — 99024 POSTOP FOLLOW-UP VISIT: CPT | Performed by: STUDENT IN AN ORGANIZED HEALTH CARE EDUCATION/TRAINING PROGRAM

## 2021-10-15 ENCOUNTER — OFFICE VISIT (OUTPATIENT)
Dept: FAMILY MEDICINE CLINIC | Facility: CLINIC | Age: 61
End: 2021-10-15

## 2021-10-15 VITALS
HEART RATE: 87 BPM | OXYGEN SATURATION: 95 % | BODY MASS INDEX: 20.94 KG/M2 | SYSTOLIC BLOOD PRESSURE: 118 MMHG | WEIGHT: 141.4 LBS | TEMPERATURE: 98.4 F | DIASTOLIC BLOOD PRESSURE: 76 MMHG | HEIGHT: 69 IN

## 2021-10-15 DIAGNOSIS — Z23 INFLUENZA VACCINE NEEDED: ICD-10-CM

## 2021-10-15 DIAGNOSIS — R63.4 WEIGHT LOSS, NON-INTENTIONAL: ICD-10-CM

## 2021-10-15 DIAGNOSIS — S42.202A CLOSED FRACTURE OF PROXIMAL END OF LEFT HUMERUS, UNSPECIFIED FRACTURE MORPHOLOGY, INITIAL ENCOUNTER: Primary | ICD-10-CM

## 2021-10-15 DIAGNOSIS — Z12.31 BREAST CANCER SCREENING BY MAMMOGRAM: ICD-10-CM

## 2021-10-15 LAB
ALBUMIN SERPL-MCNC: 5 G/DL (ref 3.5–5.2)
ALBUMIN/GLOB SERPL: 1.9 G/DL
ALP SERPL-CCNC: 94 U/L (ref 39–117)
ALT SERPL W P-5'-P-CCNC: 18 U/L (ref 1–33)
ANION GAP SERPL CALCULATED.3IONS-SCNC: 11 MMOL/L (ref 5–15)
AST SERPL-CCNC: 16 U/L (ref 1–32)
BILIRUB SERPL-MCNC: 0.3 MG/DL (ref 0–1.2)
BUN SERPL-MCNC: 14 MG/DL (ref 6–20)
BUN/CREAT SERPL: 19.2 (ref 7–25)
CALCIUM SPEC-SCNC: 9.7 MG/DL (ref 8.6–10.5)
CHLORIDE SERPL-SCNC: 103 MMOL/L (ref 98–107)
CO2 SERPL-SCNC: 27 MMOL/L (ref 22–29)
CREAT SERPL-MCNC: 0.73 MG/DL (ref 0.57–1)
GFR SERPL CREATININE-BSD FRML MDRD: 82 ML/MIN/1.73
GLOBULIN UR ELPH-MCNC: 2.7 GM/DL
GLUCOSE SERPL-MCNC: 92 MG/DL (ref 65–99)
POTASSIUM SERPL-SCNC: 4.3 MMOL/L (ref 3.5–5.2)
PROT SERPL-MCNC: 7.7 G/DL (ref 6–8.5)
SODIUM SERPL-SCNC: 141 MMOL/L (ref 136–145)
T4 SERPL-MCNC: 7.82 MCG/DL (ref 4.5–11.7)
TSH SERPL DL<=0.05 MIU/L-ACNC: 1.44 UIU/ML (ref 0.27–4.2)

## 2021-10-15 PROCEDURE — 80053 COMPREHEN METABOLIC PANEL: CPT | Performed by: FAMILY MEDICINE

## 2021-10-15 PROCEDURE — 84436 ASSAY OF TOTAL THYROXINE: CPT | Performed by: FAMILY MEDICINE

## 2021-10-15 PROCEDURE — 84479 ASSAY OF THYROID (T3 OR T4): CPT | Performed by: FAMILY MEDICINE

## 2021-10-15 PROCEDURE — 99214 OFFICE O/P EST MOD 30 MIN: CPT | Performed by: FAMILY MEDICINE

## 2021-10-15 PROCEDURE — 90686 IIV4 VACC NO PRSV 0.5 ML IM: CPT | Performed by: FAMILY MEDICINE

## 2021-10-15 PROCEDURE — 84443 ASSAY THYROID STIM HORMONE: CPT | Performed by: FAMILY MEDICINE

## 2021-10-15 PROCEDURE — 90471 IMMUNIZATION ADMIN: CPT | Performed by: FAMILY MEDICINE

## 2021-10-15 RX ORDER — BUSPIRONE HYDROCHLORIDE 5 MG/1
5 TABLET ORAL 3 TIMES DAILY
Qty: 270 TABLET | Refills: 3 | Status: SHIPPED | OUTPATIENT
Start: 2021-10-15 | End: 2023-03-16 | Stop reason: SDUPTHER

## 2021-10-15 RX ORDER — ONDANSETRON 4 MG/1
4 TABLET, FILM COATED ORAL EVERY 8 HOURS PRN
Qty: 30 TABLET | Refills: 0 | Status: SHIPPED | OUTPATIENT
Start: 2021-10-15 | End: 2022-02-15 | Stop reason: HOSPADM

## 2021-10-15 RX ORDER — OXYBUTYNIN CHLORIDE 10 MG/1
10 TABLET, EXTENDED RELEASE ORAL DAILY
Qty: 90 TABLET | Refills: 3 | Status: SHIPPED | OUTPATIENT
Start: 2021-10-15 | End: 2022-01-13

## 2021-10-15 NOTE — PROGRESS NOTES
Chief Complaint  Chief Complaint   Patient presents with   • Nausea   • Dizziness   • Headache       HPI:  Shahida Arroyo presents to [facility]    Pt reports she has had headaches for 3 days and she will be at  and has felt very nauseous and she has been vomiting. Pt goes to work about 5 am and leaves at 3pm.     Patient had left humeral fracture surgery in August 31, 2021.  Patient had fallen while holding her grandbaby at 3 AM.  Patient pain is well controlled and will be starting her physical therapy next week    Patient has lost weight since having the surgery most likely due to the fact that she works full-time as a owner of a  and has not been maintaining hydration and nutrition.  I have encouraged her to continue to eat well and to maintain hydration if she stays busy.  I will also be checking patient's blood sugars and thyroid function just to make sure that she is not had any changes.    Patient also needs to update her mammogram.    Medical History:  Past History:  Medical History: has a past medical history of Arthritis, Cystocele, unspecified (CODE), Foot pain, left (08/02/2018), Foot pain, right (08/02/2018), Heel pain, Hiatal hernia (07/07/2020), History of Clostridioides difficile colitis (07/07/2020), and Osteoporosis.   Surgical History: has a past surgical history that includes Bladder surgery; Dental surgery; Bladder repair; Cystoscopy (08/2017); Abdominal hysterectomy (08/2017); Salpingoophorectomy (08/2017); and ORIF humerus fracture (Left, 9/2/2021).   Family History: family history includes Breast cancer in her mother; Cancer in her father, maternal grandfather, maternal grandmother, mother, paternal grandfather, paternal grandmother, and other family members; Diabetes in her father, mother, and other family members; Heart disease in her father and mother; Stroke in her mother.   Social History: reports that she has never smoked. She has never used  "smokeless tobacco. She reports that she does not drink alcohol and does not use drugs.  There is no immunization history for the selected administration types on file for this patient.      Allergies: Patient has no known allergies.     Medications:  Current Outpatient Medications on File Prior to Visit   Medication Sig Dispense Refill   • docusate sodium (COLACE) 100 MG capsule Take 1 capsule by mouth 2 (Two) Times a Day As Needed for Constipation. 20 capsule 1   • [DISCONTINUED] busPIRone (BUSPAR) 5 MG tablet Take 5 mg by mouth 3 (Three) Times a Day.     • [DISCONTINUED] ondansetron (Zofran) 4 MG tablet Take 1 tablet by mouth Every 8 (Eight) Hours As Needed for Nausea or Vomiting. 30 tablet 0   • [DISCONTINUED] oxybutynin XL (DITROPAN-XL) 10 MG 24 hr tablet Take 10 mg by mouth Daily.     • oxyCODONE-acetaminophen (PERCOCET) 5-325 MG per tablet Take 1 tablet by mouth Every 4 (Four) Hours As Needed for Severe Pain . 30 tablet 0     No current facility-administered medications on file prior to visit.        Health Maintenance Due   Topic Date Due   • ANNUAL PHYSICAL  Never done   • COVID-19 Vaccine (1) Never done   • TDAP/TD VACCINES (1 - Tdap) Never done   • ZOSTER VACCINE (1 of 2) Never done   • INFLUENZA VACCINE  Never done       Vital Signs:   Vitals:    10/15/21 0807   BP: 118/76   Pulse: 87   Temp: 98.4 °F (36.9 °C)   SpO2: 95%   Weight: 64.1 kg (141 lb 6.4 oz)   Height: 175.3 cm (69\")      Body mass index is 20.88 kg/m².     ROS:  Review of Systems   Constitutional: Negative for fatigue and fever.   HENT: Negative for congestion, ear pain and sinus pressure.    Respiratory: Negative for cough, chest tightness and shortness of breath.    Cardiovascular: Negative for chest pain, palpitations and leg swelling.   Gastrointestinal: Negative for abdominal pain and diarrhea.   Genitourinary: Negative for dysuria and frequency.   Neurological: Negative for speech difficulty, headache and confusion. "   Psychiatric/Behavioral: Negative for agitation and behavioral problems.          Physical Exam  Constitutional:       Appearance: Normal appearance.   HENT:      Right Ear: Tympanic membrane normal.      Left Ear: Tympanic membrane normal.      Nose: Nose normal.   Eyes:      Extraocular Movements: Extraocular movements intact.      Conjunctiva/sclera: Conjunctivae normal.      Pupils: Pupils are equal, round, and reactive to light.   Cardiovascular:      Rate and Rhythm: Normal rate and regular rhythm.   Pulmonary:      Effort: Pulmonary effort is normal.      Breath sounds: Normal breath sounds.   Abdominal:      General: Bowel sounds are normal.   Musculoskeletal:         General: Normal range of motion.      Cervical back: Normal range of motion.   Skin:     General: Skin is warm and dry.   Neurological:      General: No focal deficit present.      Mental Status: She is alert and oriented to person, place, and time.   Psychiatric:         Mood and Affect: Mood normal.         Behavior: Behavior normal.          Result Review    ED with Leydi Rudd MD (08/27/2021)  Office Visit with Narciso Duque MD (08/31/2021)  CBC & Differential (08/27/2021 10:10)  Comprehensive Metabolic Panel (08/27/2021 10:10)       Diagnoses and all orders for this visit:    1. Closed fracture of proximal end of left humerus, unspecified fracture morphology, initial encounter (Primary)    2. Weight loss, non-intentional  -     Comprehensive Metabolic Panel  -     TSH  -     T4  -     T3, Uptake  -     T3 Uptake & FTI    3. Influenza vaccine needed  -     FluLaval/Fluarix/Fluzone >6 Months ([id number])    4. Breast cancer screening by mammogram  -     Mammo Screening, Bilateral (Annually); Future    Other orders  -     ondansetron (Zofran) 4 MG tablet; Take 1 tablet by mouth Every 8 (Eight) Hours As Needed for Nausea or Vomiting.  Dispense: 30 tablet; Refill: 0  -     oxybutynin XL (DITROPAN-XL) 10 MG 24 hr tablet; Take 1 tablet  by mouth Daily for 90 days.  Dispense: 90 tablet; Refill: 3  -     busPIRone (BUSPAR) 5 MG tablet; Take 1 tablet by mouth 3 (Three) Times a Day for 90 days.  Dispense: 270 tablet; Refill: 3          Smoking Cessation:    Shahida Arroyo  reports that she has never smoked. She has never used smokeless tobacco.          Follow Up   Return in about 6 months (around 4/15/2022).  Patient was given instructions and counseling regarding her condition or for health maintenance advice. Please see specific information pulled into the AVS if appropriate.       Marjan Gonzalez MD

## 2021-10-16 LAB
FT4I SERPL CALC-MCNC: 2 (ref 1.2–4.9)
T3RU NFR SERPL: 24 % (ref 24–39)
T4 SERPL-MCNC: 8.5 UG/DL (ref 4.5–12)

## 2021-10-18 ENCOUNTER — OFFICE VISIT (OUTPATIENT)
Dept: ORTHOPEDIC SURGERY | Facility: CLINIC | Age: 61
End: 2021-10-18

## 2021-10-18 ENCOUNTER — TELEPHONE (OUTPATIENT)
Dept: FAMILY MEDICINE CLINIC | Facility: CLINIC | Age: 61
End: 2021-10-18

## 2021-10-18 VITALS — HEIGHT: 69 IN | WEIGHT: 143.2 LBS | HEART RATE: 88 BPM | OXYGEN SATURATION: 100 % | BODY MASS INDEX: 21.21 KG/M2

## 2021-10-18 DIAGNOSIS — M79.642 LEFT HAND PAIN: ICD-10-CM

## 2021-10-18 DIAGNOSIS — S42.202D CLOSED FRACTURE OF PROXIMAL END OF LEFT HUMERUS WITH ROUTINE HEALING, UNSPECIFIED FRACTURE MORPHOLOGY, SUBSEQUENT ENCOUNTER: Primary | ICD-10-CM

## 2021-10-18 DIAGNOSIS — M79.602 LEFT ARM PAIN: ICD-10-CM

## 2021-10-18 PROCEDURE — 99024 POSTOP FOLLOW-UP VISIT: CPT | Performed by: STUDENT IN AN ORGANIZED HEALTH CARE EDUCATION/TRAINING PROGRAM

## 2021-10-18 NOTE — PROGRESS NOTES
"Chief Complaint  Follow-up of the Left Upper Arm    Subjective          Shahida Arroyo presents to [facility] for   History of Present Illness    Shahida returns for follow-up of her left upper extremity.  She has a left proximal humeral shaft fracture.  6 weeks postop  From open reduction Internal Fixation performed 09/02/2021.  Patient reports stiffness in her arm.  She denies any concerns with the  incision site aside from tenderness.  She describes a warm sensation inside of her arm.  She has been experiencing some soreness over the left fourth finger that started about a week and a half ago.  She denies any injuries to her left hand.  Patient states that she has not started physical therapy and is not scheduled to start until 10/21/2021.  She denies numbness and new injuries.  Patient affirms that she does have a history of rheumatoid arthritis.      No Known Allergies     Social History     Socioeconomic History   • Marital status:    Tobacco Use   • Smoking status: Never Smoker   • Smokeless tobacco: Never Used   Vaping Use   • Vaping Use: Never used   Substance and Sexual Activity   • Alcohol use: Never     Alcohol/week: 1.0 standard drink     Types: 1 Glasses of wine per week   • Drug use: Never   • Sexual activity: Not Currently        I reviewed the patient's chief complaint, history of present illness, review of systems, past medical history, surgical history, family history, social history, medications, and allergy list.     REVIEW OF SYSTEMS    Constitutional: Denies fevers, chills, weight loss  Cardiovascular: Denies chest pain, shortness of breath  Skin: Denies rashes, acute skin changes  Neurologic: Denies headache, loss of consciousness  MSK: Left arm pain.  Left hand pain.      Objective   Vital Signs:   Pulse 88   Ht 175.3 cm (69\")   Wt 65 kg (143 lb 3.2 oz)   SpO2 100%   BMI 21.15 kg/m²     Body mass index is 21.15 kg/m².    Physical Exam    General: Alert, " no acute distress  Left upper extremity: Healing curvilinear Incision over anterior arm and humerus.  No redness.  No drainage.  Moderate but improved swelling.  No bruising.  Mild diffuse tenderness.  Elbow range of motion lacks 30 degrees of extension.  Flexes to 120 degrees.  60 degrees supination 90 degrees pronation.  Point tenderness over the PIP joint of the ring finger.  Mild swelling.  No abrasions or wounds.  With assistance has full finger flexion but terminal pain.  Sensation intact over the medial, radial, ulnar nerve distributions.  Palpable radial pulse.  80 degrees wrist flexion and extension without pain.  Intact active finger extension across the PIP joints.      Shoulder with active elevation to 60 degrees.  With assistance shoulder elevation to 80 degrees.  External rotation to 10 degrees.  Internal rotation to the waistline. Sensation intact in the axillary nerve distribution.       Procedures    Imaging Results (Most Recent)     Procedure Component Value Units Date/Time    XR Hand 2 View Left [[id number]] Resulted: 10/18/21 1314     Updated: 10/18/21 1315    Narrative:      Indications: Left fourth finger pain    Views: AP and lateral left hand    Findings: No fractures noted.  All joints well aligned.  Mild arthritic   changes present at the DIP joints and first CMC articulation.    Comparative Data: No comparative data available      XR Humerus Left [[id number]] Resulted: 10/18/21 1313     Updated: 10/18/21 1314    Narrative:      Indications: Follow-up left humerus fracture    Views: AP and lateral left humerus    Findings: Left proximal humeral shaft fracture again seen.  Fracture   alignment stable.  Fixation construct appears stable.  No hardware   complications are noted.  Early callus forming at the fracture site.    Comparative Data: Comparative data found and reviewed today                     Assessment and Plan    Diagnoses and all orders for this visit:    1. Closed fracture of  proximal end of left humerus with routine healing, unspecified fracture morphology, subsequent encounter (Primary)    2. Left arm pain  -     XR Humerus Left    3. Left hand pain  -     XR Hand 2 View Left        Shahida is 6 weeks status post ORIF of left humerus. Her xrays show stable fracture alignment with signs of healing.  X-ray of the left hand without fracture.  Patient is advised to start and continue with formal physical therapy to work on her range of motion.  Patient is instructed not to lift over 5 pounds. Discussion with patient about possible rheumatoid flare.  Patient is advised that she can take NSAIDs for her hand pain.  She can also apply topical anti-inflammatories to her hand.  Patient is encouraged to continue with range of motion and exercises to prevent stiffness of her left hand.  Patient will follow up in 3 weeks.  New x-rays will be obtained of her left humerus.      Call or return if symptoms worsen or patient has any concerns.       Scribed for Narciso Duque MD by Narciso Duque MD  10/18/2021   09:30 EDT         Follow Up   Return in about 3 weeks (around 11/8/2021).  Patient was given instructions and counseling regarding her condition or for health maintenance advice. Please see specific information pulled into the AVS if appropriate.       I have personally performed the services described in this document as scribed by the above individual and it is both accurate and complete.     Narciso Duque MD  10/20/21  12:36 EDT

## 2021-10-20 ENCOUNTER — TREATMENT (OUTPATIENT)
Dept: PHYSICAL THERAPY | Facility: CLINIC | Age: 61
End: 2021-10-20

## 2021-10-20 DIAGNOSIS — S42.202A CLOSED FRACTURE OF PROXIMAL END OF LEFT HUMERUS, UNSPECIFIED FRACTURE MORPHOLOGY, INITIAL ENCOUNTER: ICD-10-CM

## 2021-10-20 DIAGNOSIS — M25.512 ACUTE PAIN OF LEFT SHOULDER: Primary | ICD-10-CM

## 2021-10-20 DIAGNOSIS — M25.612 SHOULDER STIFFNESS, LEFT: ICD-10-CM

## 2021-10-20 PROCEDURE — 97110 THERAPEUTIC EXERCISES: CPT | Performed by: OCCUPATIONAL THERAPIST

## 2021-10-20 PROCEDURE — 97165 OT EVAL LOW COMPLEX 30 MIN: CPT | Performed by: OCCUPATIONAL THERAPIST

## 2021-10-20 NOTE — PROGRESS NOTES
Occupational Therapy Initial Evaluation and Plan of Care      Patient: Shahida Arroyo   : 1960  Diagnosis/ICD-10 Code:  Acute pain of left shoulder [M25.512]  Referring practitioner: Narciso Duque MD  Date of Initial Visit: 10/20/2021  Today's Date: 10/20/2021  Patient seen for 1 sessions               Subjective Evaluation    History of Present Illness  Date of onset: 2021  Date of surgery: 2021  Mechanism of injury: Patient is a 61 year old LHD female.  She is the owner of [employer].  She reports that on 21 due to exhaustion  she fell in her bedroom and injured her R arm.  Xrays revealed closed displaced proximal humerus fractures.  On 21 she underwent surgery for ORIF with plate and screws.  She is now 7 weeks s/p surgery and is referred to therapy for evaluation and treatment.  She has a history of rheumatoid arthritis and osteoporosis.       Patient Occupation: Day care owner   Precautions and Work Restrictions: 5lb lifting restriction, still working but not doing anything heavyPain  Current pain ratin  At best pain ratin  At worst pain rating: 10  Quality: dull ache and throbbing  Aggravating factors: outstretched reach    Hand dominance: left    Diagnostic Tests  X-ray: abnormal    Patient Goals  Patient goals for therapy: decreased edema, decreased pain, increased motion, increased strength, independence with ADLs/IADLs, return to sport/leisure activities and return to work           Quick Dash 53/55 80-99% functional limitation    Objective          Postural Observations  Seated posture: fair  Standing posture: fair        Observations     Additional Shoulder Observation Details  Patient presents with healing incision along the anterior lateral aspect of her L upper arm    Tenderness     Additional Tenderness Details  Severe in upper arm    Cervical/Thoracic Screen   Cervical range of motion within normal limits    Neurological Testing     Sensation      Shoulder   Left Shoulder   Intact: light touch    Right Shoulder   Intact: light touch    Active Range of Motion   Left Shoulder   Flexion: 40 degrees   Extension: 30 degrees   Abduction: 30 degrees   External rotation 0°: 15 degrees   Internal rotation 45°: 40 degrees     Additional Active Range of Motion Details  Elbow ext -45 flex 140   L hand ring finger PIP joint -25 degree flexion contracture (xray shows effusion, no fracture)    Passive Range of Motion     Additional Passive Range of Motion Details  Attempts at PROM painful.  To be measured at a later date.    Scapular Mobility   Left Shoulder   Scapular mobility: fair  Scapular Mobility with Shoulder to 90° FF   Scapular winging: minimal    Strength/Myotome Testing     Additional Strength Details  Testing deferred at this time.          Assessment & Plan     Assessment  Impairments: abnormal coordination, abnormal muscle tone, abnormal or restricted ROM, activity intolerance, impaired physical strength, lacks appropriate home exercise program and pain with function  Prognosis: good  Functional Limitations: carrying objects, lifting, sleeping, pulling, pushing, uncomfortable because of pain, reaching behind back, reaching overhead and unable to perform repetitive tasks  Goals  Plan Goals:  Shoulder Pain  LTG 1  12 weeks:  Decrease pain to 1/10 to improve sleep and ADL performance  Status:  New  STG 1  6 weeks:  Decrease pain to 4/10  Status:  New    2.  Decreased shoulder AROM  LTG 2  12 weeks:  Increase shoulder AROM to 150 degrees with good functional reach into internal/external rotation to improve reach  Status: New  STG 2  6 weeks:  Increase shoulder AROM to 120 degrees  Status:  New    3.  Decreased shoulder strength  LTG 3  12 weeks:  Increase shoulder strength to 5/5 MMT to improve ability to lift, carry and handle objects  Status:  New  STG 3  6 weeks:  Good tolerance to strength testing  Status:  New    4.  Decreased functional use of the upper  extremity  LTG 4  12 weeks:  Improve function score to 19/55 or better on Quick Dash   Status:  New  STG 4  6 weeks:  Improve function score to 27/55 or better on Quick Dash  Status:  New    Plan  Planned modality interventions: iontophoresis, contrast bath immersion, cryotherapy, electrical stimulation/Russian stimulation, hydrotherapy, TENS, thermotherapy (hydrocollator packs), thermotherapy (paraffin bath) and ultrasound  Planned therapy interventions: ADL retraining, balance/weight-bearing training, body mechanics training, compression, fine motor coordination training, flexibility, manual therapy, neuromuscular re-education, motor coordination training, orthotic fitting/training, postural training, soft tissue mobilization, spinal/joint mobilization, strengthening, stretching, IADL retraining, home exercise program and functional ROM exercises  Frequency: 2x week  Duration in weeks: 12  Treatment plan discussed with: patient  Plan details:     Reviewed use of modalities,  Instructed in AROM exercises for the shoulder, elbow and hand.        Patient will be seen for skilled OT services    Visit Diagnoses:    ICD-10-CM ICD-9-CM   1. Acute pain of left shoulder  M25.512 719.41   2. Shoulder stiffness, left  M25.612 719.51   3. Closed fracture of proximal end of left humerus, unspecified fracture morphology, initial encounter  S42.202A 812.00       Timed:  Manual Therapy:    0     mins  90677;  Therapeutic Exercise:    15     mins  26157;       Untimed:  Low eval:   45     mins   Electrical Stimulation:    0     mins  57440 ( );  Fuidotherapy     0     mins  26219      Timed Treatment:   15   mins   Total Treatment:     60   mins    OT SIGNATURE: Tip Shi OT, CHT        Initial Certification  Certification Period: 10/20/2021 thru 1/18/2022  I certify that the therapy services are furnished while this patient is under my care.  The services outlined above are required by this patient, and will be reviewed  every 90 days.     PHYSICIAN: Narciso Duque MD      DATE:     Please sign and return via fax to [phone].. Thank you, [facility].

## 2021-10-27 ENCOUNTER — TREATMENT (OUTPATIENT)
Dept: PHYSICAL THERAPY | Facility: CLINIC | Age: 61
End: 2021-10-27

## 2021-10-27 DIAGNOSIS — M25.512 ACUTE PAIN OF LEFT SHOULDER: Primary | ICD-10-CM

## 2021-10-27 DIAGNOSIS — M25.612 SHOULDER STIFFNESS, LEFT: ICD-10-CM

## 2021-10-27 DIAGNOSIS — S42.202A CLOSED FRACTURE OF PROXIMAL END OF LEFT HUMERUS, UNSPECIFIED FRACTURE MORPHOLOGY, INITIAL ENCOUNTER: ICD-10-CM

## 2021-10-27 PROCEDURE — 97110 THERAPEUTIC EXERCISES: CPT | Performed by: OCCUPATIONAL THERAPIST

## 2021-11-02 ENCOUNTER — TREATMENT (OUTPATIENT)
Dept: PHYSICAL THERAPY | Facility: CLINIC | Age: 61
End: 2021-11-02

## 2021-11-02 DIAGNOSIS — M25.612 SHOULDER STIFFNESS, LEFT: ICD-10-CM

## 2021-11-02 DIAGNOSIS — M25.512 ACUTE PAIN OF LEFT SHOULDER: Primary | ICD-10-CM

## 2021-11-02 DIAGNOSIS — S42.202A CLOSED FRACTURE OF PROXIMAL END OF LEFT HUMERUS, UNSPECIFIED FRACTURE MORPHOLOGY, INITIAL ENCOUNTER: ICD-10-CM

## 2021-11-02 PROCEDURE — 97530 THERAPEUTIC ACTIVITIES: CPT | Performed by: OCCUPATIONAL THERAPIST

## 2021-11-02 PROCEDURE — 97110 THERAPEUTIC EXERCISES: CPT | Performed by: OCCUPATIONAL THERAPIST

## 2021-11-02 NOTE — PROGRESS NOTES
Occupational Therapy Daily Treatment Note      Patient: Shahida Arroyo   : 1960  Referring practitioner: Narciso Duque MD  Date of Initial Visit: Type: THERAPY  Noted: 10/20/2021  Today's Date: 2021  Patient seen for 3 sessions           Subjective Evaluation    History of Present Illness  Date of onset: 2021  Date of surgery: 2021  Mechanism of injury:  On 21 she underwent surgery for ORIF with plate and screws.   She has a history of rheumatoid arthritis and osteoporosis.       Patient Occupation: Day care owner   Precautions and Work Restrictions: 5lb lifting restriction, still working but not doing anything heavyPain  Current pain ratin  At best pain ratin  At worst pain rating: 10  Location: left arm  Quality: dull ache and throbbing  Aggravating factors: outstretched reach    Hand dominance: left    Diagnostic Tests  X-ray: abnormal    Patient Goals  Patient goals for therapy: decreased edema, decreased pain, increased motion, increased strength, independence with ADLs/IADLs, return to sport/leisure activities and return to work             Objective          Postural Observations  Seated posture: fair  Standing posture: fair        Observations     Additional Shoulder Observation Details  Patient presents with healing incision along the anterior lateral aspect of her L upper arm    Tenderness     Additional Tenderness Details  Severe in upper arm    Cervical/Thoracic Screen   Cervical range of motion within normal limits    Neurological Testing     Sensation     Shoulder   Left Shoulder   Intact: light touch    Right Shoulder   Intact: light touch    Active Range of Motion   Left Shoulder   Flexion: 40 degrees   Extension: 30 degrees   Abduction: 30 degrees   External rotation 0°: 15 degrees   Internal rotation 45°: 40 degrees     Additional Active Range of Motion Details  Elbow ext -45 flex 140  L hand ring finger PIP joint -25 degree flexion contracture (xray shows effusion,  no fracture)    Passive Range of Motion     Additional Passive Range of Motion Details  Attempts at PROM painful.  To be measured at a later date.    Scapular Mobility   Left Shoulder   Scapular mobility: fair  Scapular Mobility with Shoulder to 90° FF   Scapular winging: minimal    Strength/Myotome Testing     Additional Strength Details  Testing deferred at this time.      See Exercise, Manual, and Modality Logs for complete treatment.       Assessment/Plan    Visit Diagnoses:    ICD-10-CM ICD-9-CM   1. Acute pain of left shoulder  M25.512 719.41   2. Shoulder stiffness, left  M25.612 719.51   3. Closed fracture of proximal end of left humerus, unspecified fracture morphology, initial encounter  S42.202A 812.00       Progress per Plan of Care           Timed:  Manual Therapy:                 0     mins  34511  Therapeutic Exercise:      15     mins  16139     Neuromuscular reeducation       0     mins 62010  Therapeutic Activity              10     mins  79451  Ultrasound:                  0     mins  87854     Untimed:  Electrical Stimulation:    0     mins  28369 ( );  Fluidotherapy      0     mins  21455    Timed Treatment:   25   mins   Total Treatment:     25   mins    Tip Shi OT, SHREYA  Occupational Therapist    Electronically signed      [place] license #: [id number]

## 2021-11-04 ENCOUNTER — TREATMENT (OUTPATIENT)
Dept: PHYSICAL THERAPY | Facility: CLINIC | Age: 61
End: 2021-11-04

## 2021-11-04 DIAGNOSIS — S42.202A CLOSED FRACTURE OF PROXIMAL END OF LEFT HUMERUS, UNSPECIFIED FRACTURE MORPHOLOGY, INITIAL ENCOUNTER: ICD-10-CM

## 2021-11-04 DIAGNOSIS — M25.612 SHOULDER STIFFNESS, LEFT: Primary | ICD-10-CM

## 2021-11-04 DIAGNOSIS — Z12.39 ENCOUNTER FOR SCREENING FOR MALIGNANT NEOPLASM OF BREAST, UNSPECIFIED SCREENING MODALITY: Primary | ICD-10-CM

## 2021-11-04 DIAGNOSIS — M25.512 ACUTE PAIN OF LEFT SHOULDER: ICD-10-CM

## 2021-11-04 PROCEDURE — 97110 THERAPEUTIC EXERCISES: CPT | Performed by: OCCUPATIONAL THERAPIST

## 2021-11-04 PROCEDURE — 97530 THERAPEUTIC ACTIVITIES: CPT | Performed by: OCCUPATIONAL THERAPIST

## 2021-11-04 NOTE — PROGRESS NOTES
"Occupational Therapy Daily Treatment Note      Patient: Shahida Arroyo   : 1960  Referring practitioner: Narciso Duque MD  Date of Initial Visit: Type: THERAPY  Noted: 10/20/2021  Today's Date: 2021  Patient seen for 4 sessions           Subjective Evaluation    History of Present Illness  Date of onset: 2021  Date of surgery: 2021  Mechanism of injury:  On 21 she underwent surgery for ORIF with plate and screws.   She has a history of rheumatoid arthritis and osteoporosis.     Subjective comment: \"I'm doing pretty good today.\"  Patient Occupation: Day care owner   Precautions and Work Restrictions: 5lb lifting restriction, still working but not doing anything heavyPain  Current pain ratin  Location: left arm  Quality: dull ache    Hand dominance: left    Diagnostic Tests  X-ray: abnormal             Objective          Postural Observations  Seated posture: fair  Standing posture: fair        Observations     Additional Shoulder Observation Details  Patient presents with healing incision along the anterior lateral aspect of her L upper arm    Tenderness     Additional Tenderness Details  Severe in upper arm    Cervical/Thoracic Screen   Cervical range of motion within normal limits    Neurological Testing     Sensation     Shoulder   Left Shoulder   Intact: light touch    Right Shoulder   Intact: light touch    Active Range of Motion   Left Shoulder   Flexion: 80 degrees   Extension: 40 degrees   Abduction: 55 degrees   External rotation 0°: 15 degrees   External rotation BTH: C2   Internal rotation 45°: 40 degrees   Internal rotation BTB: Active internal rotation behind the back: to hip.     Additional Active Range of Motion Details  Elbow ext -25 flex 140   L hand ring finger PIP joint -20 degree flexion contracture (xray shows effusion, no fracture)    Passive Range of Motion     Additional Passive Range of Motion Details  Attempts at PROM painful.  To be measured at a later " date.    Scapular Mobility   Left Shoulder   Scapular mobility: fair  Scapular Mobility with Shoulder to 90° FF   Scapular winging: minimal    Strength/Myotome Testing     Additional Strength Details  Testing deferred at this time.      See Exercise, Manual, and Modality Logs for complete treatment.       Assessment/Plan    Visit Diagnoses:    ICD-10-CM ICD-9-CM   1. Shoulder stiffness, left  M25.612 719.51   2. Acute pain of left shoulder  M25.512 719.41   3. Closed fracture of proximal end of left humerus, unspecified fracture morphology, initial encounter  S42.202A 812.00       Progress per Plan of Care           Timed:  Manual Therapy:                 0     mins  25571  Therapeutic Exercise:      15     mins  17992     Neuromuscular reeducation       0     mins 64240  Therapeutic Activity              10     mins  06790  Ultrasound:                  0     mins  22878     Untimed:  Electrical Stimulation:    0     mins  98478 ( );  Fluidotherapy      0     mins  48449    Timed Treatment:   25   mins   Total Treatment:     25   mins    Tip Shi OT, LUIST  Occupational Therapist    Electronically signed      [place] license #: [id number]

## 2021-11-08 ENCOUNTER — OFFICE VISIT (OUTPATIENT)
Dept: ORTHOPEDIC SURGERY | Facility: CLINIC | Age: 61
End: 2021-11-08

## 2021-11-08 VITALS — HEIGHT: 69 IN | OXYGEN SATURATION: 97 % | WEIGHT: 142.2 LBS | BODY MASS INDEX: 21.06 KG/M2 | HEART RATE: 80 BPM

## 2021-11-08 DIAGNOSIS — Z98.890 S/P ORIF (OPEN REDUCTION INTERNAL FIXATION) FRACTURE: ICD-10-CM

## 2021-11-08 DIAGNOSIS — M79.622 LEFT UPPER ARM PAIN: Primary | ICD-10-CM

## 2021-11-08 DIAGNOSIS — Z87.81 S/P ORIF (OPEN REDUCTION INTERNAL FIXATION) FRACTURE: ICD-10-CM

## 2021-11-08 PROCEDURE — 99024 POSTOP FOLLOW-UP VISIT: CPT | Performed by: STUDENT IN AN ORGANIZED HEALTH CARE EDUCATION/TRAINING PROGRAM

## 2021-11-08 NOTE — PROGRESS NOTES
"Chief Complaint  Follow-up of the Left Upper Arm    Subjective          Shahida Arroyo presents to [facility] for   History of Present Illness    Shahida returns for follow-up of her left upper extremity.  She has a left proximal humeral shaft fracture.  She is S/P open reduction Internal Fixation of left proximal humerus shaft fracture performed 09/02/2021. She is overall doing well. She has been attending physical therapy. She has been using her shoulder and elbow. She has not been taking any medication. She is attending physical therapy twice a week at [facility] in [place]. She has no new complaints.     No Known Allergies     Social History     Socioeconomic History   • Marital status:    Tobacco Use   • Smoking status: Never Smoker   • Smokeless tobacco: Never Used   Vaping Use   • Vaping Use: Never used   Substance and Sexual Activity   • Alcohol use: Never     Alcohol/week: 1.0 standard drink     Types: 1 Glasses of wine per week   • Drug use: Never   • Sexual activity: Not Currently        I reviewed the patient's chief complaint, history of present illness, review of systems, past medical history, surgical history, family history, social history, medications, and allergy list.     REVIEW OF SYSTEMS    Constitutional: Denies fevers, chills, weight loss  Cardiovascular: Denies chest pain, shortness of breath  Skin: Denies rashes, acute skin changes  Neurologic: Denies headache, loss of consciousness  MSK: Left humerus pain      Objective   Vital Signs:   Pulse 80   Ht 175.3 cm (69\")   Wt 64.5 kg (142 lb 3.2 oz)   SpO2 97%   BMI 21.00 kg/m²     Body mass index is 21 kg/m².    Physical Exam    Left upper extremity- incision well healed to anterior arm. No signs of infection, redness or drainage. Tender over her scar. Mild tenderness over proximal humerus. Forward elevation active 60. External Rotation 20 degrees. Passive Forward elevation 90 degrees. Elbow ROM -20 to 135 degrees. " Sensation to light touch median, radial, ulnar, axillary nerve. Positive AIN, PIN, ulnar nerve. Positive pulses. Full active finger extension, 4th finger lacks ~20 extension. Full flexion of the fingers.     Procedures    Imaging Results (Most Recent)     Procedure Component Value Units Date/Time    XR Humerus Left [[id number]] Resulted: 11/08/21 1030     Updated: 11/08/21 1031    Narrative:      Indications: Follow-up left humerus fracture    Views: AP and lateral left humerus    Findings: Left proximal humeral shaft fracture again seen.  Fracture   alignment stable.  No hardware complications noted.  Callus is forming at   the fracture site.    Comparative Data: Comparative data found and reviewed today                     Assessment and Plan    Diagnoses and all orders for this visit:    1. Left upper arm pain (Primary)  -     XR Humerus Left    2. S/P open reduction Internal Fixation of left proximal humerus shaft fracture   -     Ambulatory Referral to Physical Therapy POST OP, Ortho    Discussed the treatment plan with the patient.  Plan to continue physical therapy and working on ROM and strengthening, new order given today. 10 pound lifting restriction.     Call or return if symptoms worsen or patient has any concerns.     Will obtain X-Rays of Left humerus at next visit.     Scribed for Narciso Duque MD by Narciso Duque MD  11/08/2021   09:42 EST         Follow Up   Return in about 3 weeks (around 11/29/2021).  Patient was given instructions and counseling regarding her condition or for health maintenance advice. Please see specific information pulled into the AVS if appropriate.       I have personally performed the services described in this document as scribed by the above individual and it is both accurate and complete.     Narciso Duque MD  11/09/21  16:59 EST

## 2021-11-09 ENCOUNTER — TREATMENT (OUTPATIENT)
Dept: PHYSICAL THERAPY | Facility: CLINIC | Age: 61
End: 2021-11-09

## 2021-11-09 DIAGNOSIS — S42.202A CLOSED FRACTURE OF PROXIMAL END OF LEFT HUMERUS, UNSPECIFIED FRACTURE MORPHOLOGY, INITIAL ENCOUNTER: ICD-10-CM

## 2021-11-09 DIAGNOSIS — M25.512 ACUTE PAIN OF LEFT SHOULDER: ICD-10-CM

## 2021-11-09 DIAGNOSIS — M25.612 SHOULDER STIFFNESS, LEFT: Primary | ICD-10-CM

## 2021-11-09 PROCEDURE — 97110 THERAPEUTIC EXERCISES: CPT | Performed by: OCCUPATIONAL THERAPIST

## 2021-11-09 PROCEDURE — 97530 THERAPEUTIC ACTIVITIES: CPT | Performed by: OCCUPATIONAL THERAPIST

## 2021-11-09 NOTE — PROGRESS NOTES
Occupational Therapy Daily Treatment Note      Patient: Shahida Arroyo   : 1960  Referring practitioner: Narciso Duque MD  Date of Initial Visit: Type: THERAPY  Noted: 10/20/2021  Today's Date: 2021  Patient seen for 5 sessions           Subjective Evaluation    History of Present Illness  Date of onset: 2021  Date of surgery: 2021  Mechanism of injury:  On 21 she underwent surgery for ORIF with plate and screws.   She has a history of rheumatoid arthritis and osteoporosis.     Subjective comment: Saw MD.  Doing better.  Now able to lift 10lbs.  Good healing noted on Xray  Patient Occupation: Day care owner   Precautions and Work Restrictions: 10lb lifting restriction, still working but not doing anything heavyPain  Current pain rating: 3  Location: left arm  Quality: dull ache    Hand dominance: left    Diagnostic Tests  X-ray: abnormal             Objective          Postural Observations  Seated posture: fair  Standing posture: fair        Observations     Additional Shoulder Observation Details  Patient presents with healing incision along the anterior lateral aspect of her L upper arm    Tenderness     Additional Tenderness Details  Severe in upper arm    Cervical/Thoracic Screen   Cervical range of motion within normal limits    Neurological Testing     Sensation     Shoulder   Left Shoulder   Intact: light touch    Right Shoulder   Intact: light touch    Active Range of Motion   Left Shoulder   Flexion: 80 degrees   Extension: 40 degrees   Abduction: 55 degrees   External rotation 0°: 15 degrees   External rotation BTH: C2   Internal rotation 45°: 40 degrees   Internal rotation BTB: Active internal rotation behind the back: to hip.     Additional Active Range of Motion Details  Elbow ext -25 flex 140  L hand ring finger PIP joint -20 degree flexion contracture (xray shows effusion, no fracture)    Passive Range of Motion     Additional Passive Range of Motion Details  Attempts at PROM  painful.  To be measured at a later date.    Scapular Mobility   Left Shoulder   Scapular mobility: fair  Scapular Mobility with Shoulder to 90° FF   Scapular winging: minimal    Strength/Myotome Testing     Additional Strength Details  Testing deferred at this time.      See Exercise, Manual, and Modality Logs for complete treatment.       Assessment/Plan    Visit Diagnoses:    ICD-10-CM ICD-9-CM   1. Shoulder stiffness, left  M25.612 719.51   2. Acute pain of left shoulder  M25.512 719.41   3. Closed fracture of proximal end of left humerus, unspecified fracture morphology, initial encounter  S42.202A 812.00       Progress per Plan of Care           Timed:  Manual Therapy:                 0     mins  15275  Therapeutic Exercise:      15     mins  71727     Neuromuscular reeducation       0     mins 21353  Therapeutic Activity              10     mins  15939  Ultrasound:                  0     mins  07327     Untimed:  Electrical Stimulation:    0     mins  01737 ( );  Fluidotherapy      0     mins  09910    Timed Treatment:   25   mins   Total Treatment:     25   mins    Tip Shi OT, LUIST  Occupational Therapist    Electronically signed      [place] license #: [id number]

## 2021-11-12 ENCOUNTER — TREATMENT (OUTPATIENT)
Dept: PHYSICAL THERAPY | Facility: CLINIC | Age: 61
End: 2021-11-12

## 2021-11-12 DIAGNOSIS — M25.512 ACUTE PAIN OF LEFT SHOULDER: ICD-10-CM

## 2021-11-12 DIAGNOSIS — S42.202A CLOSED FRACTURE OF PROXIMAL END OF LEFT HUMERUS, UNSPECIFIED FRACTURE MORPHOLOGY, INITIAL ENCOUNTER: ICD-10-CM

## 2021-11-12 DIAGNOSIS — M25.612 SHOULDER STIFFNESS, LEFT: Primary | ICD-10-CM

## 2021-11-12 PROCEDURE — 97110 THERAPEUTIC EXERCISES: CPT | Performed by: OCCUPATIONAL THERAPIST

## 2021-11-12 NOTE — PROGRESS NOTES
" Occupational Therapy Daily Progress Note        Patient: Shahida Arroyo   : 1960  Diagnosis/ICD-10 Code:  Shoulder stiffness, left [M25.612]  Referring practitioner: Narciso Duque MD  Date of Initial Visit: Type: THERAPY  Noted: 10/20/2021  Today's Date: 2021  Patient seen for 6 sessions             Subjective Evaluation    Pain  Current pain ratin  Location: left shoulder         Shahida Arroyo reports: \"It's doing good\"    Objective     See Exercise, Manual, and Modality Logs for complete treatment.       Assessment/Plan    Progress per Plan of Care           Timed:  Manual Therapy:    0     mins  42040;  Therapeutic Exercise:    23     mins  38712;     Neuromuscular Maura:    0    mins  32688;    Therapeutic Activity:     0     mins  82857;     Ultrasound:     0     mins  27176;    Electrical Stimulation:    0     mins  13173;    Untimed:  Electrical Stimulation:    0     mins  05133 ( );  Fluidotherapy     0     mins  69696  Hot/cold pack     0     mins  36367    Timed Treatment:   23   mins   Total Treatment:     23   mins        Jose Kohler OT  Occupational Therapist  [place] License: [id number]  NPI: [id number]  "

## 2021-11-16 ENCOUNTER — TELEPHONE (OUTPATIENT)
Dept: PHYSICAL THERAPY | Facility: CLINIC | Age: 61
End: 2021-11-16

## 2021-11-17 ENCOUNTER — TELEPHONE (OUTPATIENT)
Dept: PHYSICAL THERAPY | Facility: CLINIC | Age: 61
End: 2021-11-17

## 2021-11-23 ENCOUNTER — TREATMENT (OUTPATIENT)
Dept: PHYSICAL THERAPY | Facility: CLINIC | Age: 61
End: 2021-11-23

## 2021-11-23 DIAGNOSIS — M25.512 ACUTE PAIN OF LEFT SHOULDER: ICD-10-CM

## 2021-11-23 DIAGNOSIS — S42.202A CLOSED FRACTURE OF PROXIMAL END OF LEFT HUMERUS, UNSPECIFIED FRACTURE MORPHOLOGY, INITIAL ENCOUNTER: ICD-10-CM

## 2021-11-23 DIAGNOSIS — M25.612 SHOULDER STIFFNESS, LEFT: Primary | ICD-10-CM

## 2021-11-23 PROCEDURE — 97530 THERAPEUTIC ACTIVITIES: CPT | Performed by: OCCUPATIONAL THERAPIST

## 2021-11-23 PROCEDURE — 97110 THERAPEUTIC EXERCISES: CPT | Performed by: OCCUPATIONAL THERAPIST

## 2021-11-23 PROCEDURE — 97140 MANUAL THERAPY 1/> REGIONS: CPT | Performed by: OCCUPATIONAL THERAPIST

## 2021-11-23 NOTE — PROGRESS NOTES
"Re-Assessment / Re-Certification      Patient: Shahida Arroyo   : 1960  Diagnosis/ICD-10 Code:  Shoulder stiffness, left [M25.612]  Referring practitioner: Narciso Duque MD  Date of Initial Visit: Type: THERAPY  Noted: 10/20/2021  Today's Date: 2021  Patient seen for 7 sessions      Subjective:     Quick Dash 38/55 60-79% functional limitation  Clinical Progress: improved  Home Program Compliance: Yes  Treatment has included: therapeutic exercise, therapeutic activity and moist heat    Subjective Evaluation    History of Present Illness  Date of onset: 2021  Date of surgery: 2021  Mechanism of injury:  On 21 she underwent surgery for ORIF with plate and screws.   She has a history of rheumatoid arthritis and osteoporosis.     Subjective comment: \"I've been sick but I have been exercising\"  Patient Occupation: Day care owner   Precautions and Work Restrictions: 10lb lifting restriction, still working but not doing anything heavyPain  Current pain ratin  Location: left arm    Hand dominance: left    Diagnostic Tests  X-ray: abnormal         Objective          Postural Observations  Seated posture: fair  Standing posture: fair        Observations     Additional Shoulder Observation Details  Patient presents with healing incision along the anterior lateral aspect of her L upper arm    Tenderness     Additional Tenderness Details  Severe in upper arm    Cervical/Thoracic Screen   Cervical range of motion within normal limits    Neurological Testing     Sensation     Shoulder   Left Shoulder   Intact: light touch    Right Shoulder   Intact: light touch    Active Range of Motion   Left Shoulder   Flexion: 95 degrees   Extension: 40 degrees   Abduction: 80 degrees   External rotation 0°: 15 degrees   External rotation BTH: C2   Internal rotation 45°: 40 degrees   Internal rotation BTB: Active internal rotation behind the back: to hip.     Additional Active Range of Motion Details  Able to reach " to hip in internal rotation on L.  L elbow ext -30 flex 140   L hand ring finger PIP joint -20 degree flexion contracture (xray shows effusion, no fracture)    Passive Range of Motion     Additional Passive Range of Motion Details  Attempts at PROM painful.  To be measured at a later date.    Scapular Mobility   Left Shoulder   Scapular mobility: fair  Scapular Mobility with Shoulder to 90° FF   Scapular winging: minimal    Strength/Myotome Testing     Left Shoulder     Planes of Motion   Flexion: 3+   Abduction: 3   External rotation at 0°: 3   Internal rotation at 0°: 3+     Isolated Muscles   Biceps: 4   Triceps: 4-     Additional Strength Details   strength L 25lbs  R 65lbs      Assessment/Plan   Plan Goals:  Shoulder Pain  LTG 1  12 weeks:  Decrease pain to 1/10 to improve sleep and ADL performance  Status:  Met  STG 1  6 weeks:  Decrease pain to 4/10  Status:  Met    2.  Decreased shoulder AROM  LTG 2  12 weeks:  Increase shoulder AROM to 150 degrees with good functional reach into internal/external rotation to improve reach  Status: Not met  STG 2  6 weeks:  Increase shoulder AROM to 120 degrees  Status:  Met  3.  Decreased shoulder strength  LTG 3  12 weeks:  Increase shoulder strength to 5/5 MMT to improve ability to lift, carry and handle objects  Status:  Not met  STG 3  6 weeks:  Good tolerance to strength testing  Status:  Met    4.  Decreased functional use of the upper extremity  LTG 4  12 weeks:  Improve function score to 19/55 or better on Quick Dash   Status:  Not met  STG 4  6 weeks:  Improve function score to 27/55 or better on Quick Dash  Status: Not met    Visit Diagnoses:    ICD-10-CM ICD-9-CM   1. Shoulder stiffness, left  M25.612 719.51   2. Acute pain of left shoulder  M25.512 719.41   3. Closed fracture of proximal end of left humerus, unspecified fracture morphology, initial encounter  S42.202A 812.00       Progress toward previous goals: Partially Met       Recommendations: Continue  as planned  Timeframe: 1 month  Prognosis to achieve goals: good    OT Signature: Tip Shi OT, CHT    Electronically signed    [place] license #: [id number]    Based upon review of the patient's progress and continued therapy plan, it is my medical opinion that Shahida Arroyo should continue physical therapy treatment at [facility]  [address] [place] [address] [id number] [phone].    Signature: __________________________________  Narciso Duque MD    Timed:  Manual Therapy:                 10     mins  12852  Therapeutic Exercise:      10     mins  43917     Neuromuscular reeducation       0     mins 57603  Therapeutic Activity              8     mins  70776  Ultrasound:                  0     mins  40797      Untimed:  Electrical Stimulation:    0     mins  77648 ( )  Fluidotherapy     0     mins  68525    Timed Treatment:   28   mins   Total Treatment:     28   mins

## 2021-11-29 ENCOUNTER — OFFICE VISIT (OUTPATIENT)
Dept: ORTHOPEDIC SURGERY | Facility: CLINIC | Age: 61
End: 2021-11-29

## 2021-11-29 VITALS — BODY MASS INDEX: 21.62 KG/M2 | OXYGEN SATURATION: 97 % | HEIGHT: 69 IN | HEART RATE: 81 BPM | WEIGHT: 146 LBS

## 2021-11-29 DIAGNOSIS — Z87.81 S/P ORIF (OPEN REDUCTION INTERNAL FIXATION) FRACTURE: ICD-10-CM

## 2021-11-29 DIAGNOSIS — S42.202D CLOSED FRACTURE OF PROXIMAL END OF LEFT HUMERUS WITH ROUTINE HEALING, UNSPECIFIED FRACTURE MORPHOLOGY, SUBSEQUENT ENCOUNTER: Primary | ICD-10-CM

## 2021-11-29 DIAGNOSIS — Z98.890 S/P ORIF (OPEN REDUCTION INTERNAL FIXATION) FRACTURE: ICD-10-CM

## 2021-11-29 PROCEDURE — 99024 POSTOP FOLLOW-UP VISIT: CPT | Performed by: STUDENT IN AN ORGANIZED HEALTH CARE EDUCATION/TRAINING PROGRAM

## 2021-11-29 NOTE — PROGRESS NOTES
"Chief Complaint  Pain of the Left Shoulder    Subjective          Shahida Arroyo presents to [facility] for   History of Present Illness    The patinet presents here today for follow up evaluation of the left shoulder. She is S/P open reduction Internal Fixation of left proximal humerus shaft fracture performed 09/02/2021. She is overall doing well. She only reports pain when it is cold. She has been attending physical therapy with improvements. She has been attending twice a week. She has no other complaints. She does report tenderness to the incision.     No Known Allergies     Social History     Socioeconomic History   • Marital status:    Tobacco Use   • Smoking status: Never Smoker   • Smokeless tobacco: Never Used   Vaping Use   • Vaping Use: Never used   Substance and Sexual Activity   • Alcohol use: Never     Alcohol/week: 1.0 standard drink     Types: 1 Glasses of wine per week   • Drug use: Never   • Sexual activity: Not Currently        I reviewed the patient's chief complaint, history of present illness, review of systems, past medical history, surgical history, family history, social history, medications, and allergy list.     REVIEW OF SYSTEMS    Constitutional: Denies fevers, chills, weight loss  Cardiovascular: Denies chest pain, shortness of breath  Skin: Denies rashes, acute skin changes  Neurologic: Denies headache, loss of consciousness  MSK: Left shoulder pain      Objective   Vital Signs:   Pulse 81   Ht 175.3 cm (69\")   Wt 66.2 kg (146 lb)   SpO2 97%   BMI 21.56 kg/m²     Body mass index is 21.56 kg/m².    Physical Exam    Left shoulder- scar well healed. No signs of infection. Sensation to light touch axillary, median, radial, ulnar nerve. Positive AIN, PIN, ulnar nerve. Positive pulses. Neurovascularly intact. Forward elevation 90. External Rotation 10. Internal rotation to back pocket. Active Forward elevation 80. Elbow -5 to 130 degrees. 90 " pronation/supination.     Procedures    Imaging Results (Most Recent)     Procedure Component Value Units Date/Time    XR Humerus Left [[id number]] Resulted: 11/29/21 1003     Updated: 11/29/21 1004    Narrative:      Indications: Follow-up left humerus fracture    Views: AP and lateral left humerus    Findings: Healing left proximal humeral shaft fracture again seen.   Increased callus formation at the fracture site compared to previous   x-rays. No hardware complications noted. Glenohumeral joint reduced.    Comparative Data: Comparative data found and reviewed today                     Assessment and Plan    Diagnoses and all orders for this visit:    1. Closed fracture of proximal end of left humerus with routine healing, unspecified fracture morphology, subsequent encounter (Primary)  -     XR Humerus Left    2. S/P open reduction Internal Fixation of left proximal humerus shaft fracture         Discussed the treatment plan with the patient.  I reviewed the x-rays that were obtained today with the patient. She is overall doing well. Plan to continue physical therapy to work on ROM and progressive strengthening.     Call or return if symptoms worsen or patient has any concerns.   Will obtain X-Rays of Left shoulder at next visit.     Scribed for Narciso Duque MD by Narciso Duque MD  11/29/2021   09:18 EST         Follow Up   Return in about 3 weeks (around 12/20/2021).  Patient was given instructions and counseling regarding her condition or for health maintenance advice. Please see specific information pulled into the AVS if appropriate.       I have personally performed the services described in this document as scribed by the above individual and it is both accurate and complete.     Narciso Duque MD  11/29/21  13:00 EST

## 2021-11-30 ENCOUNTER — TELEPHONE (OUTPATIENT)
Dept: PHYSICAL THERAPY | Facility: CLINIC | Age: 61
End: 2021-11-30

## 2021-12-02 ENCOUNTER — TELEPHONE (OUTPATIENT)
Dept: PHYSICAL THERAPY | Facility: CLINIC | Age: 61
End: 2021-12-02

## 2021-12-07 ENCOUNTER — TREATMENT (OUTPATIENT)
Dept: PHYSICAL THERAPY | Facility: CLINIC | Age: 61
End: 2021-12-07

## 2021-12-07 DIAGNOSIS — M25.612 SHOULDER STIFFNESS, LEFT: Primary | ICD-10-CM

## 2021-12-07 DIAGNOSIS — M25.512 ACUTE PAIN OF LEFT SHOULDER: ICD-10-CM

## 2021-12-07 DIAGNOSIS — S42.202A CLOSED FRACTURE OF PROXIMAL END OF LEFT HUMERUS, UNSPECIFIED FRACTURE MORPHOLOGY, INITIAL ENCOUNTER: ICD-10-CM

## 2021-12-07 PROCEDURE — 97530 THERAPEUTIC ACTIVITIES: CPT | Performed by: OCCUPATIONAL THERAPIST

## 2021-12-07 PROCEDURE — 97140 MANUAL THERAPY 1/> REGIONS: CPT | Performed by: OCCUPATIONAL THERAPIST

## 2021-12-07 PROCEDURE — 97110 THERAPEUTIC EXERCISES: CPT | Performed by: OCCUPATIONAL THERAPIST

## 2021-12-07 NOTE — PROGRESS NOTES
"Occupational Therapy Daily Treatment Note      Patient: Shahida Arroyo   : 1960  Referring practitioner: Narciso Duque MD  Date of Initial Visit: Type: THERAPY  Noted: 10/20/2021  Today's Date: 2021  Patient seen for 8 sessions           Subjective Evaluation    History of Present Illness  Date of onset: 2021  Date of surgery: 2021  Mechanism of injury:  On 21 she underwent surgery for ORIF with plate and screws.   She has a history of rheumatoid arthritis and osteoporosis.     Subjective comment: \"I am still having a lot of aching from the cold\"  Patient Occupation: Day care owner   Precautions and Work Restrictions: 10lb lifting restriction, still working but not doing anything heavyPain  Current pain ratin  Location: left arm  Quality: dull ache    Hand dominance: left    Diagnostic Tests  X-ray: abnormal             Objective   See Exercise, Manual, and Modality Logs for complete treatment.       Assessment/Plan    Visit Diagnoses:    ICD-10-CM ICD-9-CM   1. Shoulder stiffness, left  M25.612 719.51   2. Acute pain of left shoulder  M25.512 719.41   3. Closed fracture of proximal end of left humerus, unspecified fracture morphology, initial encounter  S42.A 812.00       Progress per Plan of Care           Timed:  Manual Therapy:                 10     mins  46420  Therapeutic Exercise:      10     mins  20664     Neuromuscular reeducation       0     mins 57501  Therapeutic Activity              10     mins  14330  Ultrasound:                  0     mins  11303     Untimed:  Electrical Stimulation:    0     mins  84027 ( );  Fluidotherapy      0     mins  04137    Timed Treatment:   30   mins   Total Treatment:     30   mins    Tip Shi OT, LUIST  Occupational Therapist    Electronically signed      [place] license #: [id number]  "

## 2021-12-09 ENCOUNTER — OFFICE VISIT (OUTPATIENT)
Dept: FAMILY MEDICINE CLINIC | Facility: CLINIC | Age: 61
End: 2021-12-09

## 2021-12-09 ENCOUNTER — TELEPHONE (OUTPATIENT)
Dept: PHYSICAL THERAPY | Facility: CLINIC | Age: 61
End: 2021-12-09

## 2021-12-09 DIAGNOSIS — Z20.822 CLOSE EXPOSURE TO COVID-19 VIRUS: ICD-10-CM

## 2021-12-09 DIAGNOSIS — R60.0 EDEMA OF LEFT UPPER EXTREMITY: ICD-10-CM

## 2021-12-09 DIAGNOSIS — R53.1 LEFT-SIDED WEAKNESS: ICD-10-CM

## 2021-12-09 DIAGNOSIS — M79.602 LEFT ARM PAIN: ICD-10-CM

## 2021-12-09 DIAGNOSIS — R20.0 LEFT SIDED NUMBNESS: ICD-10-CM

## 2021-12-09 DIAGNOSIS — N39.498 OTHER URINARY INCONTINENCE: ICD-10-CM

## 2021-12-09 DIAGNOSIS — R48.2 APRAXIA OF EYELID OPENING: Primary | ICD-10-CM

## 2021-12-09 PROCEDURE — 99214 OFFICE O/P EST MOD 30 MIN: CPT | Performed by: FAMILY MEDICINE

## 2021-12-09 PROCEDURE — 87635 SARS-COV-2 COVID-19 AMP PRB: CPT | Performed by: FAMILY MEDICINE

## 2021-12-09 NOTE — PROGRESS NOTES
Chief Complaint  Left eye opens slowly  Left side of face feels hot  Tingling in left pain and left foot  Exposure to covid-19  Subjective          Shahida Arroyo presents to [facility]  History of Present Illness  Patient presents today for an acute visit.  She reports that 4 days ago she started noticing that her left eye opens up slower than her right eye.  She reports that she wants to open up her left eye but just cannot for couple of seconds.  She reports that she had a headache this morning.  She denies any drooping of the left eye.  She denies any vision changes.  She denies any difficulty moving her eyes.  She also experienced some left hand tingling this morning but that has improved.  She reports that since she had surgery on her left upper extremity for a humerus fracture that occurred 3 months ago she has had some swelling in her left arm.  She does have some weakness in her left  strength.  She also reports that her left foot was tingling this morning but this has stopped.  She denies sleeping in an awkward position that would have caused her left hand or her left foot to cause this.  Patient also reports that when she fell 3 months ago she got up from a seated position and her left leg was numb.  That is why she fell.  She denies any asymmetry when she smiles and she is able to raise both eyebrows.  She reports of the face feeling hot this morning has gone away and now it feels normal comparing the left to the right.  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  She has been around her.  Patient denies any upper respiratory symptoms.  She has not received her Covid vaccination.  She denies any loss of taste or smell.  Objective   Vital Signs:   There were no vitals taken for this visit.    Physical Exam   General: AAO ×3, no acute distress, pleasant  HEENT: Normocephalic, atraumatic, no discharge in the eyes, no  discharge from the nose, no oropharyngeal erythema or exudates, and TMs intact bilaterally with no erythema, no cervical tenderness or lymphadenopathy.  Pupils are equally round and reactive to light and accommodation.  EOM intact.  There is no ptosis noted of the left or right eyelid.  Cardiovascular: Regular rate and rhythm without appreciable murmur.  Negative John's test in the left upper extremity.  She does have some edema noted in her left hand.    Musculoskeletal:  strength decreased in the left upper extremity 4/5  Respiratory: Clear to auscultation bilaterally no RRW  Gastrointestinal: Soft nontender nondistended with bowel sounds present  extremities: No edema  Neurologic: CN II through XII grossly intact.  Negative Tinel's, reverse Phalen's and Phalen's test   Psychiatric: Normal mood and affect  Result Review :                 Assessment and Plan    Diagnoses and all orders for this visit:    1. Apraxia of eyelid opening, left (Primary)  -     MRI Brain With & Without Contrast; Future    2. Other urinary incontinence    3. Left arm pain  -     US Venous Doppler Upper Extremity Left (duplex); Future    4. Edema of left upper extremity  -     US Venous Doppler Upper Extremity Left (duplex); Future    5. Close exposure to COVID-19 virus  -     COVID-19,CEPHEID/BABAK/BDMAX,COR/ARN/PAD/ANDREW IN-HOUSE(OR EMERGENT/ADD-ON),NP SWAB IN TRANSPORT MEDIA 3-4 HR TAT, RT-PCR - Swab, Nasopharynx; Future  -     COVID-19,CEPHEID/BABAK/BDMAX,COR/RAN/PAD/ANDREW IN-HOUSE(OR EMERGENT/ADD-ON),NP SWAB IN TRANSPORT MEDIA 3-4 HR TAT, RT-PCR - Swab, Nasopharynx    6. Left-sided weakness  -     MRI Brain With & Without Contrast; Future    7. Left sided numbness  -     MRI Brain With & Without Contrast; Future    I had a long discussion today with patient regarding her symptoms.  I discussed with her getting a brain MRI to evaluate for CNS lesion. I do not suspect Bell's palsy or Malgorzata syndrome.  I do not suspect myasthenia gravis.   I also discussed with her the oxybutynin that she takes.  I discussed with her that this may be contributing to some of the symptoms she has experienced and it would at least be worth a try having her not take this medication for about a week to see if it makes a difference.  She has been on oxybutynin for an extended period of time now and has not had side effects with it prior.  As far as her left arm is concerned I am concerned about the edema in her left hand.  I discussed with her getting a venous Doppler left upper extremity to evaluate for DVT.  Given the exposure I did discuss with her doing a Covid test today.  Further recommendations to follow once results return.  Patient verbalized understanding.  If symptoms worsen she is instructed to call or return.    I spent 35 minutes caring for Shahida on this date of service. This time includes time spent by me in the following activities:reviewing tests, obtaining and/or reviewing a separately obtained history, counseling and educating the patient/family/caregiver, ordering medications, tests, or procedures, referring and communicating with other health care professionals , documenting information in the medical record and care coordination  Follow Up   Return if symptoms worsen or fail to improve.  Patient was given instructions and counseling regarding her condition or for health maintenance advice. Please see specific information pulled into the AVS if appropriate.

## 2021-12-10 ENCOUNTER — TRANSCRIBE ORDERS (OUTPATIENT)
Dept: ADMINISTRATIVE | Facility: HOSPITAL | Age: 61
End: 2021-12-10

## 2021-12-10 ENCOUNTER — TELEPHONE (OUTPATIENT)
Dept: FAMILY MEDICINE CLINIC | Facility: CLINIC | Age: 61
End: 2021-12-10

## 2021-12-10 DIAGNOSIS — R52 PAIN: Primary | ICD-10-CM

## 2021-12-10 LAB — SARS-COV-2 N GENE RESP QL NAA+PROBE: NOT DETECTED

## 2021-12-10 NOTE — TELEPHONE ENCOUNTER
Caller: Shahida Arroyo    Relationship: Self    Best call back number: [phone]    What test was performed: COVID TEST    When was the test performed: 12/9/21    Where was the test performed: IN OFFICE    Additional notes:HAS SEEN RESULTS ON MYCHART AND NEEDING TO DISCUSS, HAS QUESTIONS

## 2021-12-14 ENCOUNTER — TELEPHONE (OUTPATIENT)
Dept: PHYSICAL THERAPY | Facility: CLINIC | Age: 61
End: 2021-12-14

## 2021-12-17 ENCOUNTER — TELEPHONE (OUTPATIENT)
Dept: FAMILY MEDICINE CLINIC | Facility: CLINIC | Age: 61
End: 2021-12-17

## 2021-12-20 ENCOUNTER — OFFICE VISIT (OUTPATIENT)
Dept: ORTHOPEDIC SURGERY | Facility: CLINIC | Age: 61
End: 2021-12-20

## 2021-12-20 VITALS — HEART RATE: 87 BPM | OXYGEN SATURATION: 94 % | HEIGHT: 69 IN | BODY MASS INDEX: 21.18 KG/M2 | WEIGHT: 143 LBS

## 2021-12-20 DIAGNOSIS — Z98.890 S/P ORIF (OPEN REDUCTION INTERNAL FIXATION) FRACTURE: Primary | ICD-10-CM

## 2021-12-20 DIAGNOSIS — Z87.81 S/P ORIF (OPEN REDUCTION INTERNAL FIXATION) FRACTURE: Primary | ICD-10-CM

## 2021-12-20 DIAGNOSIS — Z87.81 S/P ORIF (OPEN REDUCTION INTERNAL FIXATION) FRACTURE: ICD-10-CM

## 2021-12-20 DIAGNOSIS — Z98.890 S/P ORIF (OPEN REDUCTION INTERNAL FIXATION) FRACTURE: ICD-10-CM

## 2021-12-20 PROCEDURE — 99024 POSTOP FOLLOW-UP VISIT: CPT | Performed by: STUDENT IN AN ORGANIZED HEALTH CARE EDUCATION/TRAINING PROGRAM

## 2021-12-20 NOTE — PROGRESS NOTES
"Chief Complaint  Pain of the Left Shoulder    Subjective          Shahida Arroyo presents to [facility] for   History of Present Illness    The patient presents here today for follow up evaluation of the left shoulder. She is S/P open reduction Internal Fixation of left proximal humerus shaft fracture performed 09/02/2021. She is overall doing well. She reports she did \"jolt\" the shoulder recently and has an aching pain since. She skipped a week of physical therapy due to having to quarantine. She has no other complaints.     No Known Allergies     Social History     Socioeconomic History   • Marital status:    Tobacco Use   • Smoking status: Never Smoker   • Smokeless tobacco: Never Used   Vaping Use   • Vaping Use: Never used   Substance and Sexual Activity   • Alcohol use: Never     Alcohol/week: 1.0 standard drink     Types: 1 Glasses of wine per week   • Drug use: Never   • Sexual activity: Not Currently        I reviewed the patient's chief complaint, history of present illness, review of systems, past medical history, surgical history, family history, social history, medications, and allergy list.     REVIEW OF SYSTEMS    Constitutional: Denies fevers, chills, weight loss  Cardiovascular: Denies chest pain, shortness of breath  Skin: Denies rashes, acute skin changes  Neurologic: Denies headache, loss of consciousness  MSK: Left shoulder pain      Objective   Vital Signs:   Pulse 87   Ht 175.3 cm (69\")   Wt 64.9 kg (143 lb)   SpO2 94%   BMI 21.12 kg/m²     Body mass index is 21.12 kg/m².    Physical Exam    Left shoulder- well healed incision over the arm. Non-tender. No skin discoloration, atrophy or swelling. Forward elevation 80 degrees. External Rotation 5. Internal rotation lateral hip. Sensation to light touch median, radial, ulnar nerve. Positive AIN, PIN, ulnar nerve. Positive pulses. Elbow ROM -15 to 135 degrees. Supination 80. Pronation 90. Full active finger " ROM.     Procedures    Imaging Results (Most Recent)     Procedure Component Value Units Date/Time    XR Humerus Left [[id number]] Resulted: 12/20/21 1301     Updated: 12/20/21 1302    Narrative:      Indications: Follow-up left humerus fracture    Views: AP and lateral left humerus    Findings: The left humerus fracture appears healed.  No hardware   complications noted.  Glenohumeral joint appears to be well aligned   proximally.    Comparative Data: Comparative data found and reviewed today                     Assessment and Plan    Diagnoses and all orders for this visit:    1. S/P open reduction Internal Fixation of left proximal humerus shaft fracture  (Primary)  -     XR Humerus Left    2. S/P ORIF (open reduction internal fixation) fracture  -     XR Humerus Left        Discussed the treatment plan with the patient.  Plan to continue physical therapy to work on ROM. I reviewed her x-rays that were obtained with her today.     Call or return if symptoms worsen or patient has any concerns.   Will obtain X-Rays of Left humerus at next visit.     Scribed for Narciso Duque MD by Narciso Duque MD  12/20/2021   09:52 EST         Follow Up   Return in about 3 weeks (around 1/10/2022).  Patient was given instructions and counseling regarding her condition or for health maintenance advice. Please see specific information pulled into the AVS if appropriate.       I have personally performed the services described in this document as scribed by the above individual and it is both accurate and complete.     Narciso Duque MD  12/21/21  08:33 EST

## 2021-12-21 ENCOUNTER — TREATMENT (OUTPATIENT)
Dept: PHYSICAL THERAPY | Facility: CLINIC | Age: 61
End: 2021-12-21

## 2021-12-21 DIAGNOSIS — M25.512 ACUTE PAIN OF LEFT SHOULDER: ICD-10-CM

## 2021-12-21 DIAGNOSIS — M25.612 SHOULDER STIFFNESS, LEFT: Primary | ICD-10-CM

## 2021-12-21 DIAGNOSIS — S42.202A CLOSED FRACTURE OF PROXIMAL END OF LEFT HUMERUS, UNSPECIFIED FRACTURE MORPHOLOGY, INITIAL ENCOUNTER: ICD-10-CM

## 2021-12-21 PROCEDURE — 97530 THERAPEUTIC ACTIVITIES: CPT | Performed by: OCCUPATIONAL THERAPIST

## 2021-12-21 PROCEDURE — 97110 THERAPEUTIC EXERCISES: CPT | Performed by: OCCUPATIONAL THERAPIST

## 2021-12-21 PROCEDURE — 97140 MANUAL THERAPY 1/> REGIONS: CPT | Performed by: OCCUPATIONAL THERAPIST

## 2021-12-21 NOTE — PROGRESS NOTES
"Re-Assessment / Re-Certification      Patient: Shahida Arroyo   : 1960  Diagnosis/ICD-10 Code:  Shoulder stiffness, left [M25.612]  Referring practitioner: Narciso Duque MD  Date of Initial Visit: Type: THERAPY  Noted: 10/20/2021  Today's Date: 2021  Patient seen for 9 sessions      Subjective:     Quick Dash 27/55 20-39% functional limitation  Clinical Progress: improved  Home Program Compliance: Yes  Treatment has included: therapeutic exercise, manual therapy, therapeutic activity and moist heat    Subjective Evaluation    History of Present Illness  Date of onset: 2021  Date of surgery: 2021  Mechanism of injury:  On 21 she underwent surgery for ORIF with plate and screws.   She has a history of rheumatoid arthritis and osteoporosis.     Subjective comment: \"I am feeling good.\"  Patient Occupation: Day care owner   Precautions and Work Restrictions: 10lb lifting restriction, still working but not doing anything heavyPain  Current pain ratin  Location: left arm  Quality: dull ache    Hand dominance: left    Diagnostic Tests  X-ray: abnormal         Objective          Postural Observations  Seated posture: fair  Standing posture: fair        Observations     Additional Shoulder Observation Details  Patient presents with healing incision along the anterior lateral aspect of her L upper arm    Tenderness     Additional Tenderness Details  No tenderness in upper arm    Cervical/Thoracic Screen   Cervical range of motion within normal limits    Neurological Testing     Sensation     Shoulder   Left Shoulder   Intact: light touch    Right Shoulder   Intact: light touch    Active Range of Motion   Left Shoulder   Flexion: 90 degrees   Extension: 40 degrees   Abduction: 70 degrees   External rotation 0°: 15 degrees   External rotation BTH: C2   Internal rotation 45°: 40 degrees   Internal rotation BTB: Active internal rotation behind the back: to hip.     Additional Active Range of Motion " Details  Able to reach to hip in internal rotation on L.  L elbow ext -20 flex 140   L hand ring finger PIP joint -20 degree flexion contracture (xray shows effusion, no fracture)    Scapular Mobility   Left Shoulder   Scapular mobility: fair  Scapular Mobility with Shoulder to 90° FF   Scapular winging: minimal    Strength/Myotome Testing     Left Shoulder     Planes of Motion   Flexion: 3+   Abduction: 3   External rotation at 0°: 3   Internal rotation at 0°: 3+     Isolated Muscles   Biceps: 4   Triceps: 4-     Additional Strength Details   strength L 25lbs  R 65lbs      Assessment/Plan  Plan Goals:  Shoulder Pain  LTG 1  12 weeks:  Decrease pain to 1/10 to improve sleep and ADL performance  Status:  Met  STG 1  6 weeks:  Decrease pain to 4/10  Status:  Met    2.  Decreased shoulder AROM  LTG 2  12 weeks:  Increase shoulder AROM to 150 degrees with good functional reach into internal/external rotation to improve reach  Status: Not met  STG 2  6 weeks:  Increase shoulder AROM to 120 degrees  Status:  Not met    3.  Decreased shoulder strength  LTG 3  12 weeks:  Increase shoulder strength to 5/5 MMT to improve ability to lift, carry and handle objects  Status:  Not met  STG 3  6 weeks:  Good tolerance to strength testing  Status:  Met    4.  Decreased functional use of the upper extremity  LTG 4  12 weeks:  Improve function score to 19/55 or better on Quick Dash   Status:  Not met  STG 4  6 weeks:  Improve function score to 27/55 or better on Quick Dash  Status: Met    Visit Diagnoses:    ICD-10-CM ICD-9-CM   1. Shoulder stiffness, left  M25.612 719.51   2. Acute pain of left shoulder  M25.512 719.41   3. Closed fracture of proximal end of left humerus, unspecified fracture morphology, initial encounter  S42.202A 812.00       Progress toward previous goals: Partially Met       Recommendations: Continue as planned  Timeframe: 1 month  Prognosis to achieve goals: good    OT Signature: Tip Shi OT,  CHT    Electronically signed    [place] license #: [id number]    Based upon review of the patient's progress and continued therapy plan, it is my medical opinion that Shaihda Arroyo should continue physical therapy treatment at [facility]  [address] [place] [address] [place] [address] [place] [id number] [phone].    Signature: __________________________________  Narciso Duque MD      Timed:  Manual Therapy:                 10     mins  83839  Therapeutic Exercise:      10     mins  51461     Neuromuscular reeducation       0     mins 22068  Therapeutic Activity              8     mins  56943  Ultrasound:                  0     mins  00414      Untimed:  Electrical Stimulation:    0     mins  59950 ( )  Fluidotherapy     0     mins  80764    Timed Treatment:   28   mins   Total Treatment:     28   mins

## 2021-12-23 ENCOUNTER — HOSPITAL ENCOUNTER (OUTPATIENT)
Dept: CARDIOLOGY | Facility: HOSPITAL | Age: 61
Discharge: HOME | End: 2021-12-23
Admitting: FAMILY MEDICINE

## 2021-12-23 ENCOUNTER — APPOINTMENT (OUTPATIENT)
Dept: MRI IMAGING | Facility: HOSPITAL | Age: 61
End: 2021-12-23

## 2021-12-23 ENCOUNTER — TELEPHONE (OUTPATIENT)
Dept: FAMILY MEDICINE CLINIC | Facility: CLINIC | Age: 61
End: 2021-12-23

## 2021-12-23 DIAGNOSIS — R52 PAIN: ICD-10-CM

## 2021-12-23 LAB
BH CV UPPER VENOUS LEFT AXILLARY AUGMENT: NORMAL
BH CV UPPER VENOUS LEFT AXILLARY COMPETENT: NORMAL
BH CV UPPER VENOUS LEFT AXILLARY COMPRESS: NORMAL
BH CV UPPER VENOUS LEFT AXILLARY PHASIC: NORMAL
BH CV UPPER VENOUS LEFT AXILLARY SPONT: NORMAL
BH CV UPPER VENOUS LEFT BASILIC FOREARM COMPRESS: NORMAL
BH CV UPPER VENOUS LEFT BASILIC UPPER COMPRESS: NORMAL
BH CV UPPER VENOUS LEFT BRACHIAL COMPRESS: NORMAL
BH CV UPPER VENOUS LEFT CEPHALIC FOREARM COMPRESS: NORMAL
BH CV UPPER VENOUS LEFT CEPHALIC UPPER COMPRESS: NORMAL
BH CV UPPER VENOUS LEFT INTERNAL JUGULAR AUGMENT: NORMAL
BH CV UPPER VENOUS LEFT INTERNAL JUGULAR COMPETENT: NORMAL
BH CV UPPER VENOUS LEFT INTERNAL JUGULAR COMPRESS: NORMAL
BH CV UPPER VENOUS LEFT INTERNAL JUGULAR PHASIC: NORMAL
BH CV UPPER VENOUS LEFT INTERNAL JUGULAR SPONT: NORMAL
BH CV UPPER VENOUS LEFT RADIAL COMPRESS: NORMAL
BH CV UPPER VENOUS LEFT SUBCLAVIAN AUGMENT: NORMAL
BH CV UPPER VENOUS LEFT SUBCLAVIAN COMPETENT: NORMAL
BH CV UPPER VENOUS LEFT SUBCLAVIAN COMPRESS: NORMAL
BH CV UPPER VENOUS LEFT SUBCLAVIAN PHASIC: NORMAL
BH CV UPPER VENOUS LEFT SUBCLAVIAN SPONT: NORMAL
BH CV UPPER VENOUS LEFT ULNAR COMPRESS: NORMAL
BH CV UPPER VENOUS RIGHT INTERNAL JUGULAR AUGMENT: NORMAL
BH CV UPPER VENOUS RIGHT INTERNAL JUGULAR COMPETENT: NORMAL
BH CV UPPER VENOUS RIGHT INTERNAL JUGULAR COMPRESS: NORMAL
BH CV UPPER VENOUS RIGHT INTERNAL JUGULAR PHASIC: NORMAL
BH CV UPPER VENOUS RIGHT INTERNAL JUGULAR SPONT: NORMAL
BH CV UPPER VENOUS RIGHT SUBCLAVIAN AUGMENT: NORMAL
BH CV UPPER VENOUS RIGHT SUBCLAVIAN COMPETENT: NORMAL
BH CV UPPER VENOUS RIGHT SUBCLAVIAN COMPRESS: NORMAL
BH CV UPPER VENOUS RIGHT SUBCLAVIAN PHASIC: NORMAL
BH CV UPPER VENOUS RIGHT SUBCLAVIAN SPONT: NORMAL
MAXIMAL PREDICTED HEART RATE: 161 BPM
STRESS TARGET HR: 137 BPM

## 2021-12-23 PROCEDURE — 93971 EXTREMITY STUDY: CPT

## 2021-12-23 PROCEDURE — 93971 EXTREMITY STUDY: CPT | Performed by: SURGERY

## 2021-12-23 NOTE — TELEPHONE ENCOUNTER
Caller: Shahida Arroyo    Relationship: Self    Best call back number: [phone]    What is the best time to reach you: ANYTIME    Who are you requesting to speak with (clinical staff, provider,  specific staff member): CLINICAL     What was the call regarding: PATIENT CALLED STATING THAT SHE WENT FOR HER ULTRASOUND AND THEY TOLD HER THERE WASN'T ANY ORDERS FOR A CT SCAN. SHE IS CALLING BACK IN REGARDS TO THIS.     Do you require a callback: YES PLEASE ADVISE

## 2021-12-27 NOTE — TELEPHONE ENCOUNTER
Mailbox not receiving messages at this time unable to lm . Need to know what US was for and who scheduled it without having an order ?

## 2021-12-28 ENCOUNTER — TREATMENT (OUTPATIENT)
Dept: PHYSICAL THERAPY | Facility: CLINIC | Age: 61
End: 2021-12-28

## 2021-12-28 ENCOUNTER — APPOINTMENT (OUTPATIENT)
Dept: CARDIOLOGY | Facility: HOSPITAL | Age: 61
End: 2021-12-28

## 2021-12-28 DIAGNOSIS — M25.612 SHOULDER STIFFNESS, LEFT: Primary | ICD-10-CM

## 2021-12-28 DIAGNOSIS — S42.202A CLOSED FRACTURE OF PROXIMAL END OF LEFT HUMERUS, UNSPECIFIED FRACTURE MORPHOLOGY, INITIAL ENCOUNTER: ICD-10-CM

## 2021-12-28 DIAGNOSIS — M25.512 ACUTE PAIN OF LEFT SHOULDER: ICD-10-CM

## 2021-12-28 PROCEDURE — 97110 THERAPEUTIC EXERCISES: CPT | Performed by: OCCUPATIONAL THERAPIST

## 2021-12-28 PROCEDURE — 97530 THERAPEUTIC ACTIVITIES: CPT | Performed by: OCCUPATIONAL THERAPIST

## 2021-12-31 DIAGNOSIS — Z12.31 ENCOUNTER FOR SCREENING MAMMOGRAM FOR MALIGNANT NEOPLASM OF BREAST: Primary | ICD-10-CM

## 2022-01-04 ENCOUNTER — TREATMENT (OUTPATIENT)
Dept: PHYSICAL THERAPY | Facility: CLINIC | Age: 62
End: 2022-01-04

## 2022-01-04 DIAGNOSIS — S42.202A CLOSED FRACTURE OF PROXIMAL END OF LEFT HUMERUS, UNSPECIFIED FRACTURE MORPHOLOGY, INITIAL ENCOUNTER: ICD-10-CM

## 2022-01-04 DIAGNOSIS — M25.512 ACUTE PAIN OF LEFT SHOULDER: ICD-10-CM

## 2022-01-04 DIAGNOSIS — M25.612 SHOULDER STIFFNESS, LEFT: Primary | ICD-10-CM

## 2022-01-04 PROCEDURE — 97110 THERAPEUTIC EXERCISES: CPT | Performed by: OCCUPATIONAL THERAPIST

## 2022-01-04 PROCEDURE — 97530 THERAPEUTIC ACTIVITIES: CPT | Performed by: OCCUPATIONAL THERAPIST

## 2022-01-04 PROCEDURE — 97140 MANUAL THERAPY 1/> REGIONS: CPT | Performed by: OCCUPATIONAL THERAPIST

## 2022-01-04 NOTE — PROGRESS NOTES
Occupational Therapy Daily Treatment Note      Patient: Shahida Arroyo   : 1960  Referring practitioner: Narciso Duque MD  Date of Initial Visit: Type: THERAPY  Noted: 10/20/2021  Today's Date: 2022  Patient seen for 11 sessions           Subjective Evaluation    History of Present Illness  Date of onset: 2021  Date of surgery: 2021  Mechanism of injury:  On 21 she underwent surgery for ORIF with plate and screws.   She has a history of rheumatoid arthritis and osteoporosis.     Subjective comment: No pain noted  Patient Occupation: Day care owner   Precautions and Work Restrictions: 10lb lifting restriction, still working but not doing anything heavyPain  Current pain ratin  Location: left arm    Hand dominance: left    Diagnostic Tests  X-ray: abnormal             Objective   See Exercise, Manual, and Modality Logs for complete treatment.       Assessment/Plan    Visit Diagnoses:    ICD-10-CM ICD-9-CM   1. Shoulder stiffness, left  M25.612 719.51   2. Acute pain of left shoulder  M25.512 719.41   3. Closed fracture of proximal end of left humerus, unspecified fracture morphology, initial encounter  S42.A 812.00       Progress per Plan of Care           Timed:  Manual Therapy:                 10     mins  64725  Therapeutic Exercise:      10     mins  40419     Neuromuscular reeducation       0     mins 19860  Therapeutic Activity              10     mins  70323  Ultrasound:                  0     mins  46743     Untimed:  Electrical Stimulation:    0     mins  93556 ( );  Fluidotherapy      0     mins  26606    Timed Treatment:   30   mins   Total Treatment:     30   mins    Tip Shi OT, SHREYA  Occupational Therapist    Electronically signed      [place] license #: [id number]

## 2022-01-10 ENCOUNTER — TELEPHONE (OUTPATIENT)
Dept: FAMILY MEDICINE CLINIC | Facility: CLINIC | Age: 62
End: 2022-01-10

## 2022-01-10 NOTE — TELEPHONE ENCOUNTER
Caller: Shahida Arroyo    Relationship to patient: Self    Best call back number: [phone]     Chief complaint: DIZZY, HEADACHE, SORE THROAT, CONGESTION, STOMACH PAIN AND  TIRED    Type of visit: SAME DAY     Requested date: 1-10-22    Additional notes:    EXPOSED TO COVID 19 LAST Thursday 1-6-22

## 2022-01-12 ENCOUNTER — OFFICE VISIT (OUTPATIENT)
Dept: FAMILY MEDICINE CLINIC | Facility: CLINIC | Age: 62
End: 2022-01-12

## 2022-01-12 VITALS
BODY MASS INDEX: 20.65 KG/M2 | HEART RATE: 89 BPM | SYSTOLIC BLOOD PRESSURE: 104 MMHG | OXYGEN SATURATION: 98 % | DIASTOLIC BLOOD PRESSURE: 82 MMHG | TEMPERATURE: 98.3 F | HEIGHT: 69 IN | WEIGHT: 139.4 LBS

## 2022-01-12 DIAGNOSIS — R05.9 COUGH: ICD-10-CM

## 2022-01-12 DIAGNOSIS — J40 BRONCHITIS: Primary | ICD-10-CM

## 2022-01-12 LAB
EXPIRATION DATE: NORMAL
FLUAV AG NPH QL: NEGATIVE
FLUBV AG NPH QL: NEGATIVE
INTERNAL CONTROL: NORMAL
Lab: NORMAL

## 2022-01-12 PROCEDURE — 87804 INFLUENZA ASSAY W/OPTIC: CPT | Performed by: FAMILY MEDICINE

## 2022-01-12 PROCEDURE — 99213 OFFICE O/P EST LOW 20 MIN: CPT | Performed by: FAMILY MEDICINE

## 2022-01-12 PROCEDURE — U0004 COV-19 TEST NON-CDC HGH THRU: HCPCS | Performed by: FAMILY MEDICINE

## 2022-01-12 NOTE — PROGRESS NOTES
"Chief Complaint  Cough  Low grade fever  Chills  Headache  Body aches      Subjective          Sahhida Arroyo presents to [facility]  History of Present Illness  Patient presents today for an acute visit complaining of coughing for the past 4 days.  Symptoms began on 1/8/2022.  She reports that she was exposed to a cat at her  that had COVID-19 and she was informed a couple days later by the mother that the child had tested positive.  She reports not receiving the COVID-19 vaccinations.  She has had a low-grade fever but nothing above 100.0.  She has had some chills and body aches.  Flu test today in house is negative.  She reports congestion mainly in her chest but this has been improving some.  Denies any sinus congestion or nasal congestion.  Objective   Vital Signs:   /82   Pulse 89   Temp 98.3 °F (36.8 °C)   Ht 175.3 cm (69\")   Wt 63.2 kg (139 lb 6.4 oz)   SpO2 98%   BMI 20.59 kg/m²     Physical Exam   General: AAO ×3, no acute distress  HEENT: Normocephalic, atraumatic, no discharge in the eyes, no discharge from the nose, no oropharyngeal erythema or exudates, and TMs intact bilaterally with no erythema, no cervical tenderness or lymphadenopathy  Cardiovascular: Regular rate and rhythm without appreciable murmur  Respiratory: Clear to auscultation bilaterally no RRW.  Cough with deep inspiration  Gastrointestinal: Soft nontender nondistended with bowel sounds present  extremities: No edema  Neurologic: CN II through XII grossly intact   Psychiatric: Normal mood and affect  Result Review :                 Assessment and Plan    Diagnoses and all orders for this visit:    1. Bronchitis (Primary)    2. Cough  -     COVID-19,APTIMA PANTHER(CHIOMA), BRIDGET/ ANDREW, NP/OP SWAB IN UTM/VTM/S[facility]INE TRANSPORT MEDIA,24 HR TAT - Swab, Nasopharynx  -     POCT Influenza A/B    Patient presenting with signs and symptoms of bronchitis.  Symptoms are improving some.  I offered to " send her in cough medication but she declines.  I discussed with patient staying well-hydrated.  Her flu test is negative.  Given that her symptoms are improving some I discussed holding off on adding antibiotics at this time.  I will test her for COVID-19.  She is instructed to isolate at home until results return and she receives further instruction.  Patient verbalized understanding.    Follow Up   Return if symptoms worsen or fail to improve.  Patient was given instructions and counseling regarding her condition or for health maintenance advice. Please see specific information pulled into the AVS if appropriate.

## 2022-01-13 ENCOUNTER — TELEPHONE (OUTPATIENT)
Dept: PHYSICAL THERAPY | Facility: CLINIC | Age: 62
End: 2022-01-13

## 2022-01-13 LAB — SARS-COV-2 RNA PNL SPEC NAA+PROBE: DETECTED

## 2022-01-19 ENCOUNTER — HOSPITAL ENCOUNTER (OUTPATIENT)
Dept: MAMMOGRAPHY | Facility: HOSPITAL | Age: 62
Discharge: HOME | End: 2022-01-19
Admitting: FAMILY MEDICINE

## 2022-01-19 DIAGNOSIS — Z12.39 ENCOUNTER FOR SCREENING FOR MALIGNANT NEOPLASM OF BREAST, UNSPECIFIED SCREENING MODALITY: ICD-10-CM

## 2022-01-19 PROCEDURE — 77067 SCR MAMMO BI INCL CAD: CPT

## 2022-01-19 PROCEDURE — 77063 BREAST TOMOSYNTHESIS BI: CPT

## 2022-01-20 DIAGNOSIS — R92.8 ABNORMAL MAMMOGRAM OF LEFT BREAST: Primary | ICD-10-CM

## 2022-01-24 ENCOUNTER — TELEPHONE (OUTPATIENT)
Dept: FAMILY MEDICINE CLINIC | Facility: CLINIC | Age: 62
End: 2022-01-24

## 2022-01-25 ENCOUNTER — HOSPITAL ENCOUNTER (OUTPATIENT)
Dept: MRI IMAGING | Facility: HOSPITAL | Age: 62
Discharge: HOME | End: 2022-01-25

## 2022-01-25 ENCOUNTER — HOSPITAL ENCOUNTER (OUTPATIENT)
Dept: MAMMOGRAPHY | Facility: HOSPITAL | Age: 62
Discharge: HOME | End: 2022-01-25

## 2022-01-25 DIAGNOSIS — R48.2 APRAXIA OF EYELID OPENING: ICD-10-CM

## 2022-01-25 DIAGNOSIS — R53.1 LEFT-SIDED WEAKNESS: ICD-10-CM

## 2022-01-25 DIAGNOSIS — R20.0 LEFT SIDED NUMBNESS: ICD-10-CM

## 2022-01-25 DIAGNOSIS — Z12.31 ENCOUNTER FOR SCREENING MAMMOGRAM FOR MALIGNANT NEOPLASM OF BREAST: ICD-10-CM

## 2022-01-25 PROCEDURE — A9577 INJ MULTIHANCE: HCPCS | Performed by: FAMILY MEDICINE

## 2022-01-25 PROCEDURE — 70553 MRI BRAIN STEM W/O & W/DYE: CPT

## 2022-01-25 PROCEDURE — 0 GADOBENATE DIMEGLUMINE 529 MG/ML SOLUTION: Performed by: FAMILY MEDICINE

## 2022-01-25 PROCEDURE — 77065 DX MAMMO INCL CAD UNI: CPT

## 2022-01-25 PROCEDURE — G0279 TOMOSYNTHESIS, MAMMO: HCPCS

## 2022-01-25 RX ADMIN — GADOBENATE DIMEGLUMINE 10 ML: 529 INJECTION, SOLUTION INTRAVENOUS at 07:34

## 2022-01-26 ENCOUNTER — PREP FOR SURGERY (OUTPATIENT)
Dept: OTHER | Facility: HOSPITAL | Age: 62
End: 2022-01-26

## 2022-01-26 ENCOUNTER — OFFICE VISIT (OUTPATIENT)
Dept: ORTHOPEDIC SURGERY | Facility: CLINIC | Age: 62
End: 2022-01-26

## 2022-01-26 VITALS — HEART RATE: 81 BPM | WEIGHT: 142.4 LBS | BODY MASS INDEX: 21.09 KG/M2 | OXYGEN SATURATION: 98 % | HEIGHT: 69 IN

## 2022-01-26 DIAGNOSIS — Z87.81 S/P ORIF (OPEN REDUCTION INTERNAL FIXATION) FRACTURE: Primary | ICD-10-CM

## 2022-01-26 DIAGNOSIS — Z98.890 S/P ORIF (OPEN REDUCTION INTERNAL FIXATION) FRACTURE: Primary | ICD-10-CM

## 2022-01-26 DIAGNOSIS — M79.602 LEFT ARM PAIN: ICD-10-CM

## 2022-01-26 PROCEDURE — 99214 OFFICE O/P EST MOD 30 MIN: CPT | Performed by: PHYSICIAN ASSISTANT

## 2022-01-26 RX ORDER — CEFAZOLIN SODIUM IN 0.9 % NACL 3 G/100 ML
3 INTRAVENOUS SOLUTION, PIGGYBACK (ML) INTRAVENOUS ONCE
Status: CANCELLED | OUTPATIENT
Start: 2022-01-26 | End: 2022-01-26

## 2022-01-26 RX ORDER — CEFAZOLIN SODIUM 2 G/100ML
2 INJECTION, SOLUTION INTRAVENOUS ONCE
Status: CANCELLED | OUTPATIENT
Start: 2022-01-26 | End: 2022-01-26

## 2022-01-26 NOTE — PROGRESS NOTES
Chief Complaint  Pain of the Left Shoulder    Subjective          Shahida Arroyo presents to [facility] for   History of Present Illness    Shahida presents today for follow-up of her left shoulder.  Patient had open reduction internal fixation of the proximal humerus shaft performed on 9/2/2021 by Dr. Duque.  Patient has been attending physical therapy twice a week and states that she feels she has stopped progressing with her range of motion.  She explains that she is unable to wash her hair or do daily activities due to not having for elevation or internal rotation range of motion of her shoulder.  She denies having any pain.  Patient explains that the contracture in her left fourth finger has continued to worsen.  She affirms occasional numbness in her left upper extremity.  Patient explains she recently had a brain scan due to her occasional left upper and lower extremity numbness.  She states that her brain scan came back negative.  She denies new injuries or falls.  Patient is not currently taking any blood thinners.  She is nondiabetic.      No Known Allergies     Social History     Socioeconomic History   • Marital status:    Tobacco Use   • Smoking status: Never Smoker   • Smokeless tobacco: Never Used   Vaping Use   • Vaping Use: Never used   Substance and Sexual Activity   • Alcohol use: Never     Alcohol/week: 1.0 standard drink     Types: 1 Glasses of wine per week   • Drug use: Never   • Sexual activity: Not Currently        I reviewed the patient's chief complaint, history of present illness, review of systems, past medical history, surgical history, family history, social history, medications, and allergy list.     REVIEW OF SYSTEMS    Constitutional: Denies fevers, chills, weight loss  Cardiovascular: Denies chest pain, shortness of breath  Skin: Denies rashes, acute skin changes  Neurologic: Denies headache, loss of consciousness  MSK: Left shoulder  "pain      Objective   Vital Signs:   Pulse 81   Ht 175.3 cm (69\")   Wt 64.6 kg (142 lb 6.4 oz)   SpO2 98%   BMI 21.03 kg/m²     Body mass index is 21.03 kg/m².    Physical Exam    General: Alert.  No acute distress.  Left upper extremity: Well-healed incision over the left arm.  No areas of tenderness on palpation.  Forward elevation to 90 degrees.  External rotation to 5 degrees.  Internal rotation to the lateral hip.  Elbow range of motion -15 to 135 degrees.  Nontender to the forearm or wrist.  Demonstrates full active wrist range of motion.  Thumb opposition intact.  Demonstrates active finger flexion and extension.  Patient unable to fully extend the fourth and fifth fingers.  With flexion the fourth and fifth fingers do not reach the palm.  Pain with flexion into the palm.  Sensation intact in the axillary, median, radial, and ulnar nerve distributions.  Palpable radial pulse.      Procedures    Imaging Results (Most Recent)     Procedure Component Value Units Date/Time    XR Humerus Left [[id number]] Resulted: 01/26/22 1229     Updated: 01/26/22 1231    Narrative:      Indications: Follow-up left ORIF humerus fracture    Views: AP and lateral left humerus    Findings: Healed left humerus fracture again seen.  Hardware remains in   place without any signs of complications or loosening.  Glenohumeral joint   remains reduced.  No additional fractures noted.    Comparative Data: Comparative data found and reviewed today.              Assessment and Plan    Diagnoses and all orders for this visit:    1. S/P ORIF (open reduction internal fixation) fracture (Primary)  -     MRI Hand Left Without Contrast; Future    2. Left arm pain  -     XR Humerus Left        Shahida presents today for follow-up of her open reduction internal fixation of her left proximal humerus performed on 9/2/2021 by Dr. Duque.  X-rays are reviewed today and appear stable.  Patient has continued physical therapy and noted a standstill on " her range of motion.  Discussion with patient regarding operative management to improve her range of motion for possible manipulation and lysis of adhesions.  Patient understood and elected to proceed with surgical management.  Discussion with patient regarding the importance of range of motion following surgical management.  Patient will continue with formal physical therapy and home exercises.  Left hand MRI was ordered today for further evaluation.  Patient will follow up 2 weeks postop.    Call or return if symptoms worsen or patient has any concerns.   Will obtain X-Rays of left humerus at next visit.         Follow Up   Return for Post op.  Patient was given instructions and counseling regarding her condition or for health maintenance advice. Please see specific information pulled into the AVS if appropriate.     Haley Seo PA-C  01/26/22  16:58 EST

## 2022-02-15 ENCOUNTER — ANESTHESIA EVENT (OUTPATIENT)
Dept: PERIOP | Facility: HOSPITAL | Age: 62
End: 2022-02-15

## 2022-02-15 ENCOUNTER — ANESTHESIA (OUTPATIENT)
Dept: PERIOP | Facility: HOSPITAL | Age: 62
End: 2022-02-15

## 2022-02-15 ENCOUNTER — HOSPITAL ENCOUNTER (OUTPATIENT)
Facility: HOSPITAL | Age: 62
Setting detail: HOSPITAL OUTPATIENT SURGERY
Discharge: HOME | End: 2022-02-15
Attending: STUDENT IN AN ORGANIZED HEALTH CARE EDUCATION/TRAINING PROGRAM | Admitting: STUDENT IN AN ORGANIZED HEALTH CARE EDUCATION/TRAINING PROGRAM

## 2022-02-15 ENCOUNTER — TREATMENT (OUTPATIENT)
Dept: PHYSICAL THERAPY | Facility: CLINIC | Age: 62
End: 2022-02-15

## 2022-02-15 ENCOUNTER — APPOINTMENT (OUTPATIENT)
Dept: GENERAL RADIOLOGY | Facility: HOSPITAL | Age: 62
End: 2022-02-15

## 2022-02-15 VITALS
OXYGEN SATURATION: 97 % | BODY MASS INDEX: 21.26 KG/M2 | SYSTOLIC BLOOD PRESSURE: 117 MMHG | WEIGHT: 143.52 LBS | RESPIRATION RATE: 12 BRPM | TEMPERATURE: 97.5 F | HEIGHT: 69 IN | HEART RATE: 79 BPM | DIASTOLIC BLOOD PRESSURE: 77 MMHG

## 2022-02-15 DIAGNOSIS — M25.612 SHOULDER STIFFNESS, LEFT: Primary | ICD-10-CM

## 2022-02-15 DIAGNOSIS — Z87.81 S/P ORIF (OPEN REDUCTION INTERNAL FIXATION) FRACTURE: ICD-10-CM

## 2022-02-15 DIAGNOSIS — M25.512 ACUTE PAIN OF LEFT SHOULDER: ICD-10-CM

## 2022-02-15 DIAGNOSIS — S42.202A CLOSED FRACTURE OF PROXIMAL END OF LEFT HUMERUS, UNSPECIFIED FRACTURE MORPHOLOGY, INITIAL ENCOUNTER: ICD-10-CM

## 2022-02-15 DIAGNOSIS — S42.292A OTHER CLOSED DISPLACED FRACTURE OF PROXIMAL END OF LEFT HUMERUS, INITIAL ENCOUNTER: ICD-10-CM

## 2022-02-15 DIAGNOSIS — Z98.890 S/P ORIF (OPEN REDUCTION INTERNAL FIXATION) FRACTURE: ICD-10-CM

## 2022-02-15 PROCEDURE — 25010000002 FENTANYL CITRATE (PF) 50 MCG/ML SOLUTION: Performed by: NURSE ANESTHETIST, CERTIFIED REGISTERED

## 2022-02-15 PROCEDURE — 25010000002 PROPOFOL 10 MG/ML EMULSION: Performed by: NURSE ANESTHETIST, CERTIFIED REGISTERED

## 2022-02-15 PROCEDURE — 76942 ECHO GUIDE FOR BIOPSY: CPT | Performed by: STUDENT IN AN ORGANIZED HEALTH CARE EDUCATION/TRAINING PROGRAM

## 2022-02-15 PROCEDURE — 23700 MNPJ ANES SHO JT FIXJ APRATS: CPT | Performed by: STUDENT IN AN ORGANIZED HEALTH CARE EDUCATION/TRAINING PROGRAM

## 2022-02-15 PROCEDURE — 25010000002 ONDANSETRON PER 1 MG: Performed by: NURSE ANESTHETIST, CERTIFIED REGISTERED

## 2022-02-15 PROCEDURE — 73060 X-RAY EXAM OF HUMERUS: CPT

## 2022-02-15 PROCEDURE — 25010000002 ROPIVACAINE PER 1 MG: Performed by: ANESTHESIOLOGY

## 2022-02-15 PROCEDURE — 25010000002 MIDAZOLAM PER 1 MG: Performed by: ANESTHESIOLOGY

## 2022-02-15 PROCEDURE — 25010000002 DEXAMETHASONE PER 1 MG: Performed by: NURSE ANESTHETIST, CERTIFIED REGISTERED

## 2022-02-15 PROCEDURE — 0 LIDOCAINE 1 % SOLUTION 20 ML VIAL: Performed by: STUDENT IN AN ORGANIZED HEALTH CARE EDUCATION/TRAINING PROGRAM

## 2022-02-15 PROCEDURE — 25010000002 METHYLPREDNISOLONE PER 40 MG: Performed by: STUDENT IN AN ORGANIZED HEALTH CARE EDUCATION/TRAINING PROGRAM

## 2022-02-15 PROCEDURE — 25010000002 KETOROLAC TROMETHAMINE PER 15 MG: Performed by: NURSE ANESTHETIST, CERTIFIED REGISTERED

## 2022-02-15 PROCEDURE — 97140 MANUAL THERAPY 1/> REGIONS: CPT | Performed by: OCCUPATIONAL THERAPIST

## 2022-02-15 RX ORDER — PROMETHAZINE HYDROCHLORIDE 25 MG/1
25 SUPPOSITORY RECTAL ONCE AS NEEDED
Status: DISCONTINUED | OUTPATIENT
Start: 2022-02-15 | End: 2022-02-15 | Stop reason: HOSPADM

## 2022-02-15 RX ORDER — GLYCOPYRROLATE 0.2 MG/ML
0.2 INJECTION INTRAMUSCULAR; INTRAVENOUS
Status: COMPLETED | OUTPATIENT
Start: 2022-02-15 | End: 2022-02-15

## 2022-02-15 RX ORDER — PHENYLEPHRINE HCL IN 0.9% NACL 1 MG/10 ML
SYRINGE (ML) INTRAVENOUS AS NEEDED
Status: DISCONTINUED | OUTPATIENT
Start: 2022-02-15 | End: 2022-02-15 | Stop reason: SURG

## 2022-02-15 RX ORDER — MIDAZOLAM HYDROCHLORIDE 1 MG/ML
2 INJECTION INTRAMUSCULAR; INTRAVENOUS ONCE
Status: COMPLETED | OUTPATIENT
Start: 2022-02-15 | End: 2022-02-15

## 2022-02-15 RX ORDER — SODIUM CHLORIDE, SODIUM LACTATE, POTASSIUM CHLORIDE, CALCIUM CHLORIDE 600; 310; 30; 20 MG/100ML; MG/100ML; MG/100ML; MG/100ML
9 INJECTION, SOLUTION INTRAVENOUS CONTINUOUS PRN
Status: DISCONTINUED | OUTPATIENT
Start: 2022-02-15 | End: 2022-02-15 | Stop reason: HOSPADM

## 2022-02-15 RX ORDER — OXYCODONE HYDROCHLORIDE AND ACETAMINOPHEN 5; 325 MG/1; MG/1
1 TABLET ORAL EVERY 4 HOURS PRN
Qty: 30 TABLET | Refills: 0 | Status: SHIPPED | OUTPATIENT
Start: 2022-02-15 | End: 2022-07-01

## 2022-02-15 RX ORDER — DEXAMETHASONE SODIUM PHOSPHATE 4 MG/ML
INJECTION, SOLUTION INTRA-ARTICULAR; INTRALESIONAL; INTRAMUSCULAR; INTRAVENOUS; SOFT TISSUE AS NEEDED
Status: DISCONTINUED | OUTPATIENT
Start: 2022-02-15 | End: 2022-02-15 | Stop reason: SURG

## 2022-02-15 RX ORDER — DOCUSATE SODIUM 100 MG/1
100 CAPSULE, LIQUID FILLED ORAL 2 TIMES DAILY PRN
Qty: 20 CAPSULE | Refills: 0 | Status: SHIPPED | OUTPATIENT
Start: 2022-02-15 | End: 2022-07-01

## 2022-02-15 RX ORDER — MEPERIDINE HYDROCHLORIDE 25 MG/ML
12.5 INJECTION INTRAMUSCULAR; INTRAVENOUS; SUBCUTANEOUS
Status: DISCONTINUED | OUTPATIENT
Start: 2022-02-15 | End: 2022-02-15 | Stop reason: HOSPADM

## 2022-02-15 RX ORDER — KETOROLAC TROMETHAMINE 30 MG/ML
INJECTION, SOLUTION INTRAMUSCULAR; INTRAVENOUS AS NEEDED
Status: DISCONTINUED | OUTPATIENT
Start: 2022-02-15 | End: 2022-02-15 | Stop reason: SURG

## 2022-02-15 RX ORDER — CEFAZOLIN SODIUM IN 0.9 % NACL 3 G/100 ML
3 INTRAVENOUS SOLUTION, PIGGYBACK (ML) INTRAVENOUS ONCE
Status: DISCONTINUED | OUTPATIENT
Start: 2022-02-15 | End: 2022-02-15 | Stop reason: DRUGHIGH

## 2022-02-15 RX ORDER — CEFAZOLIN SODIUM 2 G/100ML
2 INJECTION, SOLUTION INTRAVENOUS ONCE
Status: DISCONTINUED | OUTPATIENT
Start: 2022-02-15 | End: 2022-02-15 | Stop reason: HOSPADM

## 2022-02-15 RX ORDER — ONDANSETRON 2 MG/ML
4 INJECTION INTRAMUSCULAR; INTRAVENOUS ONCE AS NEEDED
Status: DISCONTINUED | OUTPATIENT
Start: 2022-02-15 | End: 2022-02-15 | Stop reason: HOSPADM

## 2022-02-15 RX ORDER — PROPOFOL 10 MG/ML
VIAL (ML) INTRAVENOUS AS NEEDED
Status: DISCONTINUED | OUTPATIENT
Start: 2022-02-15 | End: 2022-02-15 | Stop reason: SURG

## 2022-02-15 RX ORDER — ONDANSETRON 4 MG/1
4 TABLET, FILM COATED ORAL EVERY 8 HOURS PRN
Qty: 30 TABLET | Refills: 0 | Status: SHIPPED | OUTPATIENT
Start: 2022-02-15 | End: 2022-09-22

## 2022-02-15 RX ORDER — PROMETHAZINE HYDROCHLORIDE 12.5 MG/1
25 TABLET ORAL ONCE AS NEEDED
Status: DISCONTINUED | OUTPATIENT
Start: 2022-02-15 | End: 2022-02-15 | Stop reason: HOSPADM

## 2022-02-15 RX ORDER — ACETAMINOPHEN 500 MG
1000 TABLET ORAL ONCE
Status: COMPLETED | OUTPATIENT
Start: 2022-02-15 | End: 2022-02-15

## 2022-02-15 RX ORDER — FENTANYL CITRATE 50 UG/ML
INJECTION, SOLUTION INTRAMUSCULAR; INTRAVENOUS AS NEEDED
Status: DISCONTINUED | OUTPATIENT
Start: 2022-02-15 | End: 2022-02-15 | Stop reason: SURG

## 2022-02-15 RX ORDER — ROPIVACAINE HYDROCHLORIDE 5 MG/ML
INJECTION, SOLUTION EPIDURAL; INFILTRATION; PERINEURAL
Status: COMPLETED | OUTPATIENT
Start: 2022-02-15 | End: 2022-02-15

## 2022-02-15 RX ORDER — LIDOCAINE HYDROCHLORIDE 20 MG/ML
INJECTION, SOLUTION INFILTRATION; PERINEURAL AS NEEDED
Status: DISCONTINUED | OUTPATIENT
Start: 2022-02-15 | End: 2022-02-15 | Stop reason: SURG

## 2022-02-15 RX ORDER — OXYCODONE HYDROCHLORIDE 5 MG/1
5 TABLET ORAL
Status: DISCONTINUED | OUTPATIENT
Start: 2022-02-15 | End: 2022-02-15 | Stop reason: HOSPADM

## 2022-02-15 RX ADMIN — ACETAMINOPHEN 1000 MG: 500 TABLET ORAL at 09:35

## 2022-02-15 RX ADMIN — MIDAZOLAM 2 MG: 1 INJECTION INTRAMUSCULAR; INTRAVENOUS at 09:44

## 2022-02-15 RX ADMIN — GLYCOPYRROLATE 0.2 MG: 0.2 INJECTION INTRAMUSCULAR; INTRAVENOUS at 10:01

## 2022-02-15 RX ADMIN — SODIUM CHLORIDE, POTASSIUM CHLORIDE, SODIUM LACTATE AND CALCIUM CHLORIDE 9 ML/HR: 600; 310; 30; 20 INJECTION, SOLUTION INTRAVENOUS at 09:34

## 2022-02-15 RX ADMIN — KETOROLAC TROMETHAMINE 30 MG: 30 INJECTION, SOLUTION INTRAMUSCULAR; INTRAVENOUS at 10:51

## 2022-02-15 RX ADMIN — ROPIVACAINE HYDROCHLORIDE 30 ML: 5 INJECTION, SOLUTION EPIDURAL; INFILTRATION; PERINEURAL at 09:51

## 2022-02-15 RX ADMIN — Medication 50 MCG: at 10:52

## 2022-02-15 RX ADMIN — DEXAMETHASONE SODIUM PHOSPHATE 8 MG: 4 INJECTION INTRA-ARTICULAR; INTRALESIONAL; INTRAMUSCULAR; INTRAVENOUS; SOFT TISSUE at 10:50

## 2022-02-15 RX ADMIN — PROPOFOL 150 MG: 10 INJECTION, EMULSION INTRAVENOUS at 10:41

## 2022-02-15 RX ADMIN — FENTANYL CITRATE 100 MCG: 50 INJECTION, SOLUTION INTRAMUSCULAR; INTRAVENOUS at 10:40

## 2022-02-15 RX ADMIN — ONDANSETRON 4 MG: 2 INJECTION INTRAMUSCULAR; INTRAVENOUS at 10:51

## 2022-02-15 RX ADMIN — LIDOCAINE HYDROCHLORIDE 100 MG: 20 INJECTION, SOLUTION INFILTRATION; PERINEURAL at 10:40

## 2022-02-15 NOTE — ANESTHESIA PREPROCEDURE EVALUATION
Anesthesia Evaluation     Patient summary reviewed and Nursing notes reviewed   no history of anesthetic complications:  NPO Solid Status: > 8 hours  NPO Liquid Status: > 2 hours           Airway   Mallampati: II  TM distance: >3 FB  Neck ROM: full  No difficulty expected  Dental      Pulmonary - negative pulmonary ROS and normal exam    breath sounds clear to auscultation  Cardiovascular - negative cardio ROS and normal exam  Exercise tolerance: good (4-7 METS)    Rhythm: regular  Rate: normal        Neuro/Psych- negative ROS  GI/Hepatic/Renal/Endo    (+)  hiatal hernia,      Musculoskeletal     Abdominal    Substance History - negative use     OB/GYN negative ob/gyn ROS         Other   arthritis,                      Anesthesia Plan    ASA 2     general with block       Anesthetic plan, all risks, benefits, and alternatives have been provided, discussed and informed consent has been obtained with: patient.        CODE STATUS:

## 2022-02-15 NOTE — DISCHARGE INSTRUCTIONS
DISCHARGE INSTRUCTIONS  ORTHOPEDICS      ? For your surgery you had:  ? General anesthesia (you may have a sore throat for the first 24 hours)    ? Local anesthesia      ? You may experience dizziness, drowsiness, or light-headedness for several hours following surgery  ? Do not stay alone today or tonight.  ? Limit your activity for 24 hours.  ? Resume your diet slowly.  Follow whatever special dietary instructions you may have been given by the doctor.  ? You should not drive or operate machinery or drink alcohol for 24 hours or while you are taking pain medication.  ? You should not sign any legally binding documents.  ? If you had an axillary or regional block, you will not have control of the involved limb for up to 12 hours.  Protect the arm with a sling or follow your physician's specific instructions.  ? You may remove dressing:  [] in 24 hours  [] in 48 hours  [] Other:    ? You may shower or bathe:    ? Sleep with the injured part elevated on a pillow.  ? Medications per physician's instructions as indicated on Discharge Medication Information Sheet.  ? Follow verbal instructions of your doctor.  ? Exercise fingers or toes for 10 minutes every hour while awake. ? Ice bag to injured area for 72 hours.  Apply 20 minutes on - 20 minutes off.  Never place ice directly on skin or cast.    Avoid getting cast or dressing wet.  ? In addition to these instructions, follow the discharge instructions on postoperative arthroscopic surgery.  SPECIAL INSTRUCTIONS:           Last dose of pain medication was given at:    NOTIFY THE PHYSICIAN IF YOU EXPERIENCE:  1. Numbness of fingers or toes.  2. Inability to move fingers or toes.  3. Extreme coldness, paleness or blue dis-coloration of fingers or toes.  4. Excessive swelling of affected surgical site or swelling that causes the cast to rub or cut into skin.  5. Pain unrelieved by pain medication  6. Nausea/vomiting not relieved by prescribed medication  7. Unable to  urinate in 6 hours after surgery  8. Temperature greater than 101 degree Fahrenheit or chills  9. If unable to reach your doctor, please go to the closest emergency room  You should see DrOlga  for follow-up care   on   .   Phone number:          Orthopedic instructions: Perform range of motion exercises as instructed by physical therapy.  Follow-up with physical therapy later today.  Use your sling only until the block wears off.  Discontinue the sling after that time.  Apply ice as needed to help with pain and swelling.  Monitor for increasing pain, incisional redness or drainage, fevers, chills.  Call the office with any concerns.  Follow-up with Dr. Duque in 2 weeks for reevaluation.

## 2022-02-15 NOTE — PROGRESS NOTES
Re-Assessment / Re-Certification      Patient: Shahida Arroyo   : 1960  Diagnosis/ICD-10 Code:  Shoulder stiffness, left [M25.612]  Referring practitioner: Narciso Duque MD  Date of Initial Visit: No linked episodes  Today's Date: 2022  Patient seen for 1 sessions      Subjective:     Quick Dash 50/55  Clinical Progress: R shoulder Manipulation   Home Program Compliance: Yes  Treatment has included: therapeutic exercise, manual therapy, therapeutic activity and moist heat    Subjective Evaluation    History of Present Illness  Date of onset: 2021  Date of surgery: 2021  Mechanism of injury:  On 21 she underwent surgery for ORIF with plate and screws.   She has a history of rheumatoid arthritis and osteoporosis.     She underwent R shoulder manipulation today. 2/15/22    Subjective comment: No pain today since she just underwent manipulation  Patient Occupation: Day care owner   Precautions and Work Restrictions: 10lb lifting restriction, still working but not doing anything heavyPain  Current pain ratin  Location: left arm    Hand dominance: left    Diagnostic Tests  X-ray: abnormal         Objective          Active Range of Motion     Additional Active Range of Motion Details  Not tested    Passive Range of Motion   Left Shoulder   Flexion: 140 degrees   Abduction: 125 degrees   External rotation 90°: 70 degrees   Internal rotation 90°: 60 degrees     Scapular Mobility   Left Shoulder   Scapular mobility: fair      Assessment/Plan    Visit Diagnoses:    ICD-10-CM ICD-9-CM   1. Shoulder stiffness, left  M25.612 719.51   2. Acute pain of left shoulder  M25.512 719.41   3. Closed fracture of proximal end of left humerus, unspecified fracture morphology, initial encounter  S42A 812.00       Progress toward previous goals: Not Met       Recommendations: Continue with recommendations for ROM and strengthening  Timeframe: 2 months  Prognosis to achieve goals: good    OT Signature: Tip  NOREEN Shi, CHT    Electronically signed    [place] license #: [id number]    Based upon review of the patient's progress and continued therapy plan, it is my medical opinion that Shahida Arroyo should continue physical therapy treatment at [facility]   [facility] [address] [id number] [phone].    Signature: __________________________________  Narciso Duque MD  NPI: [id number]      Timed:  Manual Therapy:                 23     mins  25372  Therapeutic Exercise:      0     mins  88799     Neuromuscular reeducation       0     mins 12210  Therapeutic Activity              0     mins  30862  Ultrasound:                  0     mins  59659      Untimed:  Electrical Stimulation:    0     mins  40019 ( )  Fluidotherapy     0     mins  77609    Timed Treatment:   23   mins   Total Treatment:     23   mins

## 2022-02-15 NOTE — ANESTHESIA PROCEDURE NOTES
Peripheral Block      Patient reassessed immediately prior to procedure    Patient location during procedure: pre-op  Start time: 2/15/2022 9:45 AM  Stop time: 2/15/2022 9:51 AM  Reason for block: at surgeon's request and post-op pain management  Performed by  Anesthesiologist: Yuan Patrick MD  Preanesthetic Checklist  Completed: patient identified, IV checked, site marked, risks and benefits discussed, surgical consent, monitors and equipment checked, pre-op evaluation and timeout performed  Prep:  Pt Position: supine (HOB elevated)  Sterile barriers:cap, washed/disinfected hands, sterile barriers, gloves, mask, partial drape and alcohol skin prep  Prep: ChloraPrep  Patient monitoring: blood pressure monitoring, continuous pulse oximetry and EKG  Procedure    Sedation: yes    Guidance:ultrasound guided and nerve stimulator    ULTRASOUND INTERPRETATION.  Using ultrasound guidance a 22 G gauge needle was placed in close proximity to the brachial plexus nerve, at which point, under ultrasound guidance anesthetic was injected in the area of the nerve and spread of the anesthesia was seen on ultrasound in close proximity thereto.  There were no abnormalities seen on ultrasound; a digital image was taken; and the patient tolerated the procedure with no complications. Images:still images obtained, printed/placed on chart    Laterality:left  Block Type:interscalene  Injection Technique:single-shot  Needle Type:echogenic  Needle Gauge:22 G (2in)  Resistance on Injection: none    Medications Used: ropivacaine (NAROPIN) 0.5 % injection, 30 mL      Post Assessment  Injection Assessment: negative aspiration for heme, no paresthesia on injection and incremental injection  Patient Tolerance:comfortable throughout block  Complications:no  Additional Notes  The block or continuous infusion is requested by the referring physician for management of postoperative pain, or pain related to a procedure. Ultrasound guidance (deemed  medically necessary). Painless injection, pt was awake and conversant during the procedure without complications. Needle and surrounding structures visualized throughout procedure. No adverse reactions or complications seen during this period. Post-procedure image showed no signs of complication, and anatomy was consistent with an uncomplicated nerve blockade.

## 2022-02-15 NOTE — INTERVAL H&P NOTE
H&P reviewed. The patient was examined and there are no changes to the H&P.    I have seen and examined the above patient and agree with the plan.     Cardiac: Intact peripheral pulses  Pulmonary: Nonlabored respirations on room air.  Left upper extremity: Well-healed curvilinear incision over the anterior aspect of the arm.  No swelling.  No wounds.  Nontender over the humeral shaft.  Active forward elevation to 80 degrees with a firm endpoint.  No additional passive elevation could be achieved.  External rotation to neutral with the arm at the side.  Sensation intact in the axillary, median, radial, ulnar nerve distribution.  Lacks 10 degrees of elbow extension.  Intact motor function with AIN, PIN, ulnar function.  Flexion contracture across the fourth finger PIP joint appears stable.  Palpable radial pulse.    We reviewed the risks, benefits, indications, and alternatives to left shoulder manipulation under anesthesia with possible arthroscopic lysis of adhesions.  We specifically discussed that the primary benefit of the procedure is improved shoulder range of motion.  We discussed procedural risks including bleeding, infection, damage nerves and blood vessels, persistent pain, recurrent stiffness, fracture, hardware complications, anesthesia risk, DVT/PE, and need for additional procedures.  She elected to proceed and consent was obtained.    Electronically signed by Narciso Duque MD, 02/15/22, 8:53 AM EST.

## 2022-02-15 NOTE — OP NOTE
SHOULDER MANIPULATION  Procedure Report    Patient Name:  Shahida Arroyo  YOB: 1960    Date of Surgery:  2/15/2022     Indications: Shahida is a 61-year-old female who is status post open reduction internal fixation of a left proximal humerus shaft fracture with me on 9/2/2021.  She has developed shoulder stiffness postoperatively.  We discussed treatment options.  She has been attending physical therapy as instructed.  We discussed additional measures.  We specifically discussed a left shoulder manipulation under anesthesia with possible arthroscopic lysis of adhesions.  We discussed that the primary benefit of the procedure is to improve her shoulder range of motion.  We discussed procedural risks including bleeding infection, damage nerves and blood vessels, persistent pain, recurrent stiffness, fracture, hardware complications, anesthesia risk, joint dislocation, DVT/PE, and need for additional procedures.  She elected to proceed and consent was obtained.    Pre-op Diagnosis:   S/P ORIF (open reduction internal fixation) fracture [Z98.890, Z87.81]       Post-Op Diagnosis Codes:     * S/P ORIF (open reduction internal fixation) fracture [Z98.890, Z87.81]    Procedure/CPT® Codes:      Procedure(s):  LEFT SHOULDER MANIPULATION WITH STEROID INJECTION    Staff:  Surgeon(s):  Narciso Duque MD         Anesthesia: Choice    Estimated Blood Loss: none    Implants:    Nothing was implanted during the procedure    Specimen:          None        Findings:     Premanipulation: 90 degrees forward elevation, 5 degrees external rotation at neutral    Postmanipulation: 180 degrees of elevation, 45 degrees external rotation at neutral, 80 degrees external rotation with the arm at 90 degrees of abduction, full cross body adduction    Complications: None    Description of Procedure: The patient was met in the preoperative holding area and the operative extremity was marked.  A preoperative peripheral nerve block was  performed by the anesthesia team.  The patient was transported the operating room and monitored anesthesia care was induced.  A timeout was taken to ensure the appropriate patient, procedure, and procedural site.  All were in agreement.  I utilized the Codman technique to perform a left shoulder manipulation.  Audible crepitus was appreciated as the scar tissue released.  I was able to achieve 180 degrees of elevation, 45 degrees of external rotation with the arm at neutral, 80 degrees of external rotation with the arm at 90 degrees of abduction, and full cross body adduction with the shoulder.  I was satisfied with the manipulation results.  No arthroscopy was necessary.  I then cleaned the anterior shoulder with alcohol swabs x2.  A left shoulder steroid injection utilizing a milligrams of Depo-Medrol 9 cc 1% lidocaine was performed without complication.  Sterile bandage was applied.  The patient woke from anesthesia without complication was transferred to the recovery room in stable condition.  She maintained a palpable radial pulse.          Narciso Duque MD     Date: 2/15/2022  Time: 11:04 EST

## 2022-02-15 NOTE — ANESTHESIA POSTPROCEDURE EVALUATION
Patient: Shahida Arroyo    Procedure Summary     Date: 02/15/22 Room / Location: [facility] OR  / [facility]    Anesthesia Start: 1041 Anesthesia Stop: 1101    Procedure: LEFT SHOULDER MANIPULATION WITH STEROID INJECTION (Left Shoulder) Diagnosis:       S/P ORIF (open reduction internal fixation) fracture      (S/P ORIF (open reduction internal fixation) fracture [Z98.890, Z87.81])    Surgeons: Narciso Duque MD Provider: Yuan Patrick MD    Anesthesia Type: general with block ASA Status: 2          Anesthesia Type: general with block    Vitals  Vitals Value Taken Time   /70 02/15/22 1125   Temp 36.6 °C (97.9 °F) 02/15/22 1104   Pulse 81 02/15/22 1134   Resp 12 02/15/22 1104   SpO2 97 % 02/15/22 1134   Vitals shown include unvalidated device data.        Post Anesthesia Care and Evaluation    Patient location during evaluation: bedside  Patient participation: complete - patient participated  Level of consciousness: awake  Pain score: 0  Pain management: adequate  Airway patency: patent  Anesthetic complications: No anesthetic complications  PONV Status: none  Cardiovascular status: acceptable and stable  Respiratory status: acceptable  Hydration status: acceptable    Comments: An Anesthesiologist personally participated in the most demanding procedures (including induction and emergence if applicable) in the anesthesia plan, monitored the course of anesthesia administration at frequent intervals and remained physically present and available for immediate diagnosis and treatment of emergencies.

## 2022-02-16 ENCOUNTER — HOSPITAL ENCOUNTER (OUTPATIENT)
Dept: MRI IMAGING | Facility: HOSPITAL | Age: 62
Discharge: HOME | End: 2022-02-16
Admitting: PHYSICIAN ASSISTANT

## 2022-02-16 ENCOUNTER — TREATMENT (OUTPATIENT)
Dept: PHYSICAL THERAPY | Facility: CLINIC | Age: 62
End: 2022-02-16

## 2022-02-16 DIAGNOSIS — Z98.890 S/P ORIF (OPEN REDUCTION INTERNAL FIXATION) FRACTURE: ICD-10-CM

## 2022-02-16 DIAGNOSIS — M25.512 ACUTE PAIN OF LEFT SHOULDER: ICD-10-CM

## 2022-02-16 DIAGNOSIS — Z87.81 S/P ORIF (OPEN REDUCTION INTERNAL FIXATION) FRACTURE: ICD-10-CM

## 2022-02-16 DIAGNOSIS — M25.612 SHOULDER STIFFNESS, LEFT: Primary | ICD-10-CM

## 2022-02-16 PROCEDURE — 97140 MANUAL THERAPY 1/> REGIONS: CPT | Performed by: OCCUPATIONAL THERAPIST

## 2022-02-16 PROCEDURE — 97110 THERAPEUTIC EXERCISES: CPT | Performed by: OCCUPATIONAL THERAPIST

## 2022-02-16 PROCEDURE — 73218 MRI UPPER EXTREMITY W/O DYE: CPT

## 2022-02-16 NOTE — PROGRESS NOTES
Occupational Therapy Daily Progress Note        Patient: Shahida Arroyo   : 1960  Diagnosis/ICD-10 Code:  Shoulder stiffness, left [M25.612]  Referring practitioner: Narciso Duque MD  Date of Initial Visit: Type: THERAPY  Noted: 2/15/2022  Today's Date: 2022  Patient seen for 2 sessions             Subjective Evaluation    Pain  Current pain ratin         Shahida Arroyo reports: No pain.    Objective     See Exercise, Manual, and Modality Logs for complete treatment.       Assessment/Plan  Pt still reports numbness in arm and is unable to actively move shoulder. Pt demonstrates slightly less PROM this date compared to yesterday. Pt reports pain with stretches. OT discussed completing Tbar SROM at home.  Progress per Plan of Care           Timed:  Manual Therapy:    15     mins  70989;  Therapeutic Exercise:    8     mins  59458;     Neuromuscular Maura:    0    mins  65527;    Therapeutic Activity:     0     mins  63415;     Ultrasound:     0     mins  30357;    Electrical Stimulation:    0     mins  29686;    Untimed:  Electrical Stimulation:    0     mins  80598 ( );  Fluidotherapy     0     mins  10714  Hot/cold pack     0     mins  75239    Timed Treatment:   23   mins   Total Treatment:     23   mins        Jose Kohler OT  Occupational Therapist  [place] License: [id number]  NPI: [id number]

## 2022-02-17 ENCOUNTER — TELEPHONE (OUTPATIENT)
Dept: FAMILY MEDICINE CLINIC | Facility: CLINIC | Age: 62
End: 2022-02-17

## 2022-02-17 NOTE — TELEPHONE ENCOUNTER
Caller: Shahida Arroyo    Relationship: Self    Best call back number: [phone]    Caller requesting test results: MRI    When was the test performed: 2/16/22    Where was the test performed: [facility]     Additional notes: REQUESTING CALL TO DISCUSS

## 2022-02-18 ENCOUNTER — TREATMENT (OUTPATIENT)
Dept: PHYSICAL THERAPY | Facility: CLINIC | Age: 62
End: 2022-02-18

## 2022-02-18 DIAGNOSIS — M25.512 ACUTE PAIN OF LEFT SHOULDER: ICD-10-CM

## 2022-02-18 DIAGNOSIS — M25.612 SHOULDER STIFFNESS, LEFT: Primary | ICD-10-CM

## 2022-02-18 PROCEDURE — 97140 MANUAL THERAPY 1/> REGIONS: CPT | Performed by: OCCUPATIONAL THERAPIST

## 2022-02-18 PROCEDURE — 97110 THERAPEUTIC EXERCISES: CPT | Performed by: OCCUPATIONAL THERAPIST

## 2022-02-18 NOTE — PROGRESS NOTES
" Occupational Therapy Daily Progress Note        Patient: Shahida Arroyo   : 1960  Diagnosis/ICD-10 Code:  Shoulder stiffness, left [M25.612]  Referring practitioner: Narciso Duque MD  Date of Initial Visit: Type: THERAPY  Noted: 2/15/2022  Today's Date: 2022  Patient seen for 3 sessions             Subjective Evaluation    Pain  Current pain ratin         Shahida Arroyo reports: \"It's doing better\"    Objective          Active Range of Motion   Left Shoulder   Flexion: 105 degrees   Abduction: 85 degrees   External rotation 0°: 25 degrees     Additional Active Range of Motion Details  IR to back hip    Passive Range of Motion   Left Shoulder   Flexion: 140 degrees   Abduction: 125 degrees   External rotation 90°: 70 degrees   Internal rotation 90°: 60 degrees     Scapular Mobility   Left Shoulder   Scapular mobility: fair        See Exercise, Manual, and Modality Logs for complete treatment.       Assessment/Plan  Pt able to actively move left UE this date. Pt reports it is doing better. Pt tolerated session well with no c/o increased pain. OT provided shoulder supine and table top AAROM HEP. Teachback successful.  Progress per Plan of Care           Timed:  Manual Therapy:    15     mins  82743;  Therapeutic Exercise:    15     mins  50036;     Neuromuscular Maura:    0    mins  40426;    Therapeutic Activity:     0     mins  02114;     Ultrasound:     0     mins  86417;    Electrical Stimulation:    0     mins  74739;    Untimed:  Electrical Stimulation:    0     mins  39050 ( );  Fluidotherapy     0     mins  81946  Hot/cold pack     0     mins  83741    Timed Treatment:   30   mins   Total Treatment:     30   mins        Jose Kohler OT  Occupational Therapist  [place] License: [id number]  NPI: [id number]  "

## 2022-02-21 ENCOUNTER — TREATMENT (OUTPATIENT)
Dept: PHYSICAL THERAPY | Facility: CLINIC | Age: 62
End: 2022-02-21

## 2022-02-21 DIAGNOSIS — M25.512 ACUTE PAIN OF LEFT SHOULDER: ICD-10-CM

## 2022-02-21 DIAGNOSIS — M25.612 SHOULDER STIFFNESS, LEFT: Primary | ICD-10-CM

## 2022-02-21 PROCEDURE — 97110 THERAPEUTIC EXERCISES: CPT | Performed by: OCCUPATIONAL THERAPIST

## 2022-02-21 PROCEDURE — 97140 MANUAL THERAPY 1/> REGIONS: CPT | Performed by: OCCUPATIONAL THERAPIST

## 2022-02-21 NOTE — PROGRESS NOTES
" Occupational Therapy Daily Progress Note        Patient: Shahida Arroyo   : 1960  Diagnosis/ICD-10 Code:  Shoulder stiffness, left [M25.612]  Referring practitioner: Narciso Duque MD  Date of Initial Visit: Type: THERAPY  Noted: 2/15/2022  Today's Date: 2022  Patient seen for 4 sessions             Subjective Evaluation    Pain  No pain reported         Shahida Arroyo reports: \"It's doing good\"    Objective     See Exercise, Manual, and Modality Logs for complete treatment.       Assessment/Plan  Pt tolerated well with no c/o increased pain. OT provided cross body capsule stretch and behind the back IR stretch HEP. Teachback successful.  Progress per Plan of Care           Timed:  Manual Therapy:    15     mins  96996;  Therapeutic Exercise:    15     mins  93123;     Neuromuscular Maura:    0    mins  81662;    Therapeutic Activity:     0     mins  29348;     Ultrasound:     0     mins  39789;    Electrical Stimulation:    0     mins  38964;    Untimed:  Electrical Stimulation:    0     mins  81361 (MC );  Fluidotherapy     0     mins  84867  Hot/cold pack     0     mins  99280    Timed Treatment:   30   mins   Total Treatment:     30   mins        Jose Kohler OT  Occupational Therapist  [place] License: [id number]  NPI: [id number]  "

## 2022-02-23 ENCOUNTER — TREATMENT (OUTPATIENT)
Dept: PHYSICAL THERAPY | Facility: CLINIC | Age: 62
End: 2022-02-23

## 2022-02-23 DIAGNOSIS — M25.512 ACUTE PAIN OF LEFT SHOULDER: ICD-10-CM

## 2022-02-23 DIAGNOSIS — S42.202A CLOSED FRACTURE OF PROXIMAL END OF LEFT HUMERUS, UNSPECIFIED FRACTURE MORPHOLOGY, INITIAL ENCOUNTER: ICD-10-CM

## 2022-02-23 DIAGNOSIS — M25.612 SHOULDER STIFFNESS, LEFT: Primary | ICD-10-CM

## 2022-02-23 PROCEDURE — 97110 THERAPEUTIC EXERCISES: CPT | Performed by: OCCUPATIONAL THERAPIST

## 2022-02-23 PROCEDURE — 97140 MANUAL THERAPY 1/> REGIONS: CPT | Performed by: OCCUPATIONAL THERAPIST

## 2022-02-23 NOTE — PROGRESS NOTES
Occupational Therapy Daily Progress Note        Patient: Shahida Arroyo   : 1960  Diagnosis/ICD-10 Code:  Shoulder stiffness, left [M25.612]  Referring practitioner: Narciso Duque MD  Date of Initial Visit: Type: THERAPY  Noted: 2/15/2022  Today's Date: 2022  Patient seen for 5 sessions             Subjective Evaluation    Pain  No pain reported         Shahida Arroyo reports: No pain.    Objective     See Exercise, Manual, and Modality Logs for complete treatment.       Assessment/Plan  Pt tolerated treatment well with no c/o increased pain. Pt reports pain in right shoulder with behind the back IR stretch. OT advised pt to complete with right hand facing anteriorly to better position shoulder. Teachback successful.  Progress per Plan of Care           Timed:  Manual Therapy:    15     mins  49402;  Therapeutic Exercise:    15     mins  48165;     Neuromuscular Maura:    0    mins  14154;    Therapeutic Activity:     0     mins  64355;     Ultrasound:     0     mins  85227;    Electrical Stimulation:    0     mins  88821;    Untimed:  Electrical Stimulation:    0     mins  84543 ( );  Fluidotherapy     0     mins  36020  Hot/cold pack     0     mins  25546    Timed Treatment:   30   mins   Total Treatment:     30   mins        Jose Kohler OT  Occupational Therapist  [place] License: [id number]  NPI: [id number]

## 2022-02-24 ENCOUNTER — OFFICE VISIT (OUTPATIENT)
Dept: FAMILY MEDICINE CLINIC | Facility: CLINIC | Age: 62
End: 2022-02-24

## 2022-02-24 VITALS
HEIGHT: 69 IN | OXYGEN SATURATION: 100 % | TEMPERATURE: 96.9 F | SYSTOLIC BLOOD PRESSURE: 118 MMHG | DIASTOLIC BLOOD PRESSURE: 68 MMHG | HEART RATE: 87 BPM | WEIGHT: 145 LBS | BODY MASS INDEX: 21.48 KG/M2

## 2022-02-24 DIAGNOSIS — N30.01 ACUTE CYSTITIS WITH HEMATURIA: Primary | ICD-10-CM

## 2022-02-24 LAB
BILIRUB BLD-MCNC: NEGATIVE MG/DL
CLARITY, POC: ABNORMAL
COLOR UR: YELLOW
EXPIRATION DATE: ABNORMAL
GLUCOSE UR STRIP-MCNC: NEGATIVE MG/DL
KETONES UR QL: NEGATIVE
LEUKOCYTE EST, POC: ABNORMAL
Lab: ABNORMAL
NITRITE UR-MCNC: NEGATIVE MG/ML
PH UR: 5.5 [PH] (ref 5–8)
PROT UR STRIP-MCNC: ABNORMAL MG/DL
RBC # UR STRIP: ABNORMAL /UL
SP GR UR: 1.03 (ref 1–1.03)
UROBILINOGEN UR QL: NORMAL

## 2022-02-24 PROCEDURE — 87186 SC STD MICRODIL/AGAR DIL: CPT | Performed by: NURSE PRACTITIONER

## 2022-02-24 PROCEDURE — 99213 OFFICE O/P EST LOW 20 MIN: CPT | Performed by: NURSE PRACTITIONER

## 2022-02-24 PROCEDURE — 87086 URINE CULTURE/COLONY COUNT: CPT | Performed by: NURSE PRACTITIONER

## 2022-02-24 PROCEDURE — 87077 CULTURE AEROBIC IDENTIFY: CPT | Performed by: NURSE PRACTITIONER

## 2022-02-24 RX ORDER — NITROFURANTOIN 25; 75 MG/1; MG/1
100 CAPSULE ORAL 2 TIMES DAILY
Qty: 14 CAPSULE | Refills: 0 | Status: SHIPPED | OUTPATIENT
Start: 2022-02-24 | End: 2022-07-01 | Stop reason: SDUPTHER

## 2022-02-24 RX ORDER — PHENAZOPYRIDINE HYDROCHLORIDE 200 MG/1
200 TABLET, FILM COATED ORAL 3 TIMES DAILY PRN
Qty: 9 TABLET | Refills: 0 | Status: SHIPPED | OUTPATIENT
Start: 2022-02-24 | End: 2022-11-16

## 2022-02-24 NOTE — PROGRESS NOTES
"Chief Complaint  Urinary Tract Infection    Subjective          Medical History: has a past medical history of Arthritis, Cystocele, unspecified (CODE), Foot pain, left (08/02/2018), Foot pain, right (08/02/2018), Heel pain, Hiatal hernia (07/07/2020), History of Clostridioides difficile colitis (07/07/2020), and Osteoporosis.     Surgical History: has a past surgical history that includes Bladder surgery; Dental surgery; Bladder repair; Cystoscopy (08/2017); Abdominal hysterectomy (08/2017); Salpingoophorectomy (08/2017); ORIF humerus fracture (Left, 9/2/2021); and Shoulder Manipulation (Left, 2/15/2022).     Family History: family history includes Breast cancer in her mother; Cancer in her father, maternal grandfather, maternal grandmother, mother, paternal grandfather, paternal grandmother, and other family members; Diabetes in her father, mother, and other family members; Heart disease in her father and mother; Stroke in her mother.     Social History: reports that she has never smoked. She has never used smokeless tobacco. She reports that she does not drink alcohol and does not use drugs.    Shahida Arroyo presents to [facility]  Urinary Tract Infection   This is a new problem. The current episode started in the past 7 days. The problem occurs every urination. The problem has been waxing and waning. The quality of the pain is described as burning. The pain is mild. There has been no fever. Pertinent negatives include no flank pain, hesitancy or urgency. She has tried increased fluids for the symptoms. The treatment provided no relief.       Objective   Vital Signs:   /68   Pulse 87   Temp 96.9 °F (36.1 °C)   Ht 175.3 cm (69\")   Wt 65.8 kg (145 lb)   SpO2 100%   BMI 21.41 kg/m²     Physical Exam  Vitals reviewed.   Constitutional:       Appearance: Normal appearance. She is well-developed.   HENT:      Head: Normocephalic and atraumatic.   Eyes:      " Conjunctiva/sclera: Conjunctivae normal.      Pupils: Pupils are equal, round, and reactive to light.   Cardiovascular:      Rate and Rhythm: Normal rate and regular rhythm.      Heart sounds: No murmur heard.      Pulmonary:      Effort: Pulmonary effort is normal.      Breath sounds: Normal breath sounds. No wheezing or rhonchi.   Abdominal:      Tenderness: There is no right CVA tenderness or left CVA tenderness.   Skin:     General: Skin is warm and dry.   Neurological:      Mental Status: She is alert and oriented to person, place, and time.   Psychiatric:         Mood and Affect: Mood and affect normal.         Behavior: Behavior normal.         Thought Content: Thought content normal.         Judgment: Judgment normal.        Result Review :   The following data was reviewed by: GABE Lopez on 02/24/2022:  Common labs    Common Labsle 8/27/21 8/27/21 10/15/21    1010 1010    Glucose  117 (A) 92   BUN  14 14   Creatinine  0.70 0.73   eGFR Non African Am  86 82   Sodium  138 141   Potassium  4.0 4.3   Chloride  104 103   Calcium  9.1 9.7   Albumin  4.40 5.00   Total Bilirubin  0.5 0.3   Alkaline Phosphatase  81 94   AST (SGOT)  14 16   ALT (SGPT)  15 18   WBC 10.64     Hemoglobin 12.9     Hematocrit 38.5     Platelets 206     (A) Abnormal value                        Current Outpatient Medications on File Prior to Visit   Medication Sig Dispense Refill   • busPIRone (BUSPAR) 5 MG tablet Take 1 tablet by mouth 3 (Three) Times a Day for 90 days. 270 tablet 3   • docusate sodium (COLACE) 100 MG capsule Take 1 capsule by mouth 2 (Two) Times a Day As Needed for Constipation. 20 capsule 0   • ondansetron (Zofran) 4 MG tablet Take 1 tablet by mouth Every 8 (Eight) Hours As Needed for Nausea or Vomiting. 30 tablet 0   • oxyCODONE-acetaminophen (PERCOCET) 5-325 MG per tablet Take 1 tablet by mouth Every 4 (Four) Hours As Needed for Severe Pain . 30 tablet 0     No current facility-administered medications  on file prior to visit.        Assessment and Plan    Diagnoses and all orders for this visit:    1. Acute cystitis with hematuria (Primary)  Comments:  Will start on Macrobid, short dose pyridum,will urine for culture.  Discussed return precautions.  Pt verbalized understanding agreeable current treatment plan.  Orders:  -     POCT urinalysis dipstick, automated  -     Urine Culture - Urine, Urine, Clean Catch    Other orders  -     nitrofurantoin, macrocrystal-monohydrate, (Macrobid) 100 MG capsule; Take 1 capsule by mouth 2 (Two) Times a Day.  Dispense: 14 capsule; Refill: 0  -     phenazopyridine (Pyridium) 200 MG tablet; Take 1 tablet by mouth 3 (Three) Times a Day As Needed for Bladder Spasms.  Dispense: 9 tablet; Refill: 0        Follow Up   No follow-ups on file.  Patient was given instructions and counseling regarding her condition or for health maintenance advice. Please see specific information pulled into the AVS if appropriate.

## 2022-02-25 ENCOUNTER — TREATMENT (OUTPATIENT)
Dept: PHYSICAL THERAPY | Facility: CLINIC | Age: 62
End: 2022-02-25

## 2022-02-25 DIAGNOSIS — M25.612 SHOULDER STIFFNESS, LEFT: Primary | ICD-10-CM

## 2022-02-25 DIAGNOSIS — S42.202A CLOSED FRACTURE OF PROXIMAL END OF LEFT HUMERUS, UNSPECIFIED FRACTURE MORPHOLOGY, INITIAL ENCOUNTER: ICD-10-CM

## 2022-02-25 DIAGNOSIS — M25.512 ACUTE PAIN OF LEFT SHOULDER: ICD-10-CM

## 2022-02-25 PROCEDURE — 97110 THERAPEUTIC EXERCISES: CPT | Performed by: OCCUPATIONAL THERAPIST

## 2022-02-25 PROCEDURE — 97140 MANUAL THERAPY 1/> REGIONS: CPT | Performed by: OCCUPATIONAL THERAPIST

## 2022-02-25 NOTE — PROGRESS NOTES
Re-Assessment / Re-Certification      Patient: Shahida Arroyo   : 1960  Diagnosis/ICD-10 Code:  Shoulder stiffness, left [M25.612]  Referring practitioner: Narciso Duque MD  Date of Initial Visit: Type: THERAPY  Noted: 2/15/2022  Today's Date: 2022  Patient seen for 6 sessions      Subjective:     Quick Dash 26/55 20-39% functional limitation  Clinical Progress: improved  Home Program Compliance: Yes  Treatment has included: therapeutic exercise, manual therapy, therapeutic activity and moist heat    Subjective Evaluation    History of Present Illness  Date of onset: 2021  Date of surgery: 2021  Mechanism of injury:  On 21 she underwent surgery for ORIF with plate and screws.   She has a history of rheumatoid arthritis and osteoporosis.     She underwent R shoulder manipulation today. 2/15/22      Patient Occupation: Day care owner   Precautions and Work Restrictions: 10lb lifting restriction, still working but not doing anything heavyPain  Current pain ratin  Location: left arm    Hand dominance: left    Diagnostic Tests  X-ray: abnormal         Objective          Postural Observations  Seated posture: good  Standing posture: good        Tenderness     Left Shoulder   No tenderness     Cervical/Thoracic Screen   Cervical range of motion within normal limits    Neurological Testing     Sensation     Shoulder   Left Shoulder   Intact: light touch    Active Range of Motion   Left Shoulder   Flexion: 120 degrees   Extension: 30 degrees   Abduction: 110 degrees   External rotation BTH: C2   Internal rotation BTB: sacrum     Passive Range of Motion   Left Shoulder   Flexion: 140 degrees   Abduction: 125 degrees   External rotation 90°: 70 degrees   Internal rotation 90°: 60 degrees     Scapular Mobility   Left Shoulder   Scapular mobility: fair      Assessment/Plan  Plan Goals:  Shoulder Pain  LTG 1  12 weeks:  Decrease pain to 1/10 to improve sleep and ADL performance  Status:  Met  STG 1  6  weeks:  Decrease pain to 4/10  Status:  Met    2.  Decreased shoulder AROM  LTG 2  12 weeks:  Increase shoulder AROM to 150 degrees with good functional reach into internal/external rotation to improve reach  Status: Not met  STG 2  6 weeks:  Increase shoulder AROM to 120 degrees  Status: Met    3.  Decreased shoulder strength  LTG 3  12 weeks:  Increase shoulder strength to 5/5 MMT to improve ability to lift, carry and handle objects  Status:  Not met  STG 3  6 weeks:  Good tolerance to strength testing  Status:  Met    4.  Decreased functional use of the upper extremity  LTG 4  12 weeks:  Improve function score to 19/55 or better on Quick Dash   Status:  Not met  STG 4  6 weeks:  Improve function score to 27/55 or better on Quick Dash  Status: Met    Visit Diagnoses:    ICD-10-CM ICD-9-CM   1. Shoulder stiffness, left  M25.612 719.51   2. Acute pain of left shoulder  M25.512 719.41   3. Closed fracture of proximal end of left humerus, unspecified fracture morphology, initial encounter  S42.202A 812.00       Progress toward previous goals: Partially Met       Recommendations: Continue as planned  Timeframe: 1 month  Prognosis to achieve goals: good    OT Signature: Tip Shi OT, CHT    Electronically signed    [place] license #: [id number]    Based upon review of the patient's progress and continued therapy plan, it is my medical opinion that Shahida Arroyo should continue physical therapy treatment at [facility]  [address] [place] [address] [id number] [phone].    Signature: __________________________________  Narciso Duque MD  NPI: [id number]    Timed:  Manual Therapy:                 15     mins  35307  Therapeutic Exercise:      10     mins  81065     Neuromuscular reeducation       0     mins 27296  Therapeutic Activity              0     mins  32864  Ultrasound:                  0     mins  77668      Untimed:  Electrical Stimulation:    0     mins  81386 (JANE  )  Fluidotherapy     0     mins  63662    Timed Treatment:   25   mins   Total Treatment:     25   mins

## 2022-02-26 LAB — BACTERIA SPEC AEROBE CULT: ABNORMAL

## 2022-02-28 ENCOUNTER — TREATMENT (OUTPATIENT)
Dept: PHYSICAL THERAPY | Facility: CLINIC | Age: 62
End: 2022-02-28

## 2022-02-28 DIAGNOSIS — M25.612 SHOULDER STIFFNESS, LEFT: Primary | ICD-10-CM

## 2022-02-28 DIAGNOSIS — M25.512 ACUTE PAIN OF LEFT SHOULDER: ICD-10-CM

## 2022-02-28 PROCEDURE — 97140 MANUAL THERAPY 1/> REGIONS: CPT | Performed by: OCCUPATIONAL THERAPIST

## 2022-02-28 PROCEDURE — 97110 THERAPEUTIC EXERCISES: CPT | Performed by: OCCUPATIONAL THERAPIST

## 2022-02-28 NOTE — PROGRESS NOTES
" Occupational Therapy Daily Progress Note        Patient: Shahida Arroyo   : 1960  Diagnosis/ICD-10 Code:  Shoulder stiffness, left [M25.612]  Referring practitioner: Narciso Duque MD  Date of Initial Visit: Type: THERAPY  Noted: 2/15/2022  Today's Date: 2022  Patient seen for 7 sessions             Subjective   Shahida Arroyo reports: \"No pain.\"    Objective     See Exercise, Manual, and Modality Logs for complete treatment.       Assessment/Plan  Pt tolerated well with no c/o increased pain. Pt continues with shoulder stiffness/weakness that limits pt functionally.  Progress per Plan of Care           Timed:  Manual Therapy:    15     mins  13435;  Therapeutic Exercise:    15     mins  37909;     Neuromuscular Maura:    0    mins  18554;    Therapeutic Activity:     0     mins  52714;     Ultrasound:     0     mins  35733;    Electrical Stimulation:    0     mins  35179;    Untimed:  Electrical Stimulation:    0     mins  71549 ( );  Fluidotherapy     0     mins  51958  Hot/cold pack     0     mins  94662    Timed Treatment:   30   mins   Total Treatment:     30   mins        Jose Kohler OT  Occupational Therapist  [place] License: [id number]  NPI: [id number]  "

## 2022-03-04 ENCOUNTER — TREATMENT (OUTPATIENT)
Dept: PHYSICAL THERAPY | Facility: CLINIC | Age: 62
End: 2022-03-04

## 2022-03-04 PROCEDURE — 97110 THERAPEUTIC EXERCISES: CPT | Performed by: OCCUPATIONAL THERAPIST

## 2022-03-04 PROCEDURE — 97140 MANUAL THERAPY 1/> REGIONS: CPT | Performed by: OCCUPATIONAL THERAPIST

## 2022-03-04 NOTE — PROGRESS NOTES
Occupational Therapy Daily Treatment Note      Patient: Shahida Arroyo   : 1960  Referring practitioner: Narciso Duque MD  Date of Initial Visit: Type: THERAPY  Noted: 2/15/2022  Today's Date: 3/4/2022  Patient seen for 8 sessions           Subjective Evaluation    History of Present Illness  Date of onset: 2021  Date of surgery: 2021  Mechanism of injury: ORIF with plate and screws.       She has a history of rheumatoid arthritis and osteoporosis.     She underwent R shoulder manipulation 2/15/22      Patient Occupation: Day care owner   Precautions and Work Restrictions: 10lb lifting restriction, still working but not doing anything heavyPain  Current pain ratin  Location: left arm    Hand dominance: left    Diagnostic Tests  X-ray: abnormal             Objective   See Exercise, Manual, and Modality Logs for complete treatment.       Assessment/Plan    Visit Diagnoses:  No diagnosis found.    Progress per Plan of Care           Timed:  Manual Therapy:                 15   mins  28181  Therapeutic Exercise:      15     mins  41868     Neuromuscular reeducation       0     mins 56125  Therapeutic Activity              0     mins  62037  Ultrasound:                  0     mins  16556     Untimed:  Electrical Stimulation:    0     mins  59722 ( );  Fluidotherapy      0     mins  15542    Timed Treatment:   30   mins   Total Treatment:     30   mins    Tip Shi OT, CHT  Occupational Therapist    Electronically signed      [place] license #: [id number]

## 2022-03-07 ENCOUNTER — TELEPHONE (OUTPATIENT)
Dept: PHYSICAL THERAPY | Facility: OTHER | Age: 62
End: 2022-03-07

## 2022-03-09 ENCOUNTER — TREATMENT (OUTPATIENT)
Dept: PHYSICAL THERAPY | Facility: CLINIC | Age: 62
End: 2022-03-09

## 2022-03-09 DIAGNOSIS — M25.512 ACUTE PAIN OF LEFT SHOULDER: ICD-10-CM

## 2022-03-09 DIAGNOSIS — S42.202A CLOSED FRACTURE OF PROXIMAL END OF LEFT HUMERUS, UNSPECIFIED FRACTURE MORPHOLOGY, INITIAL ENCOUNTER: ICD-10-CM

## 2022-03-09 DIAGNOSIS — M25.612 SHOULDER STIFFNESS, LEFT: Primary | ICD-10-CM

## 2022-03-09 PROCEDURE — 97110 THERAPEUTIC EXERCISES: CPT | Performed by: OCCUPATIONAL THERAPIST

## 2022-03-09 PROCEDURE — 97140 MANUAL THERAPY 1/> REGIONS: CPT | Performed by: OCCUPATIONAL THERAPIST

## 2022-03-09 NOTE — PROGRESS NOTES
Occupational Therapy Daily Treatment Note      Patient: Shahida Arroyo   : 1960  Referring practitioner: Narciso Duque MD  Date of Initial Visit: Type: THERAPY  Noted: 2/15/2022  Today's Date: 3/9/2022  Patient seen for 9 sessions           Subjective Evaluation    History of Present Illness  Date of onset: 2021  Date of surgery: 2021  Mechanism of injury: ORIF with plate and screws.       She has a history of rheumatoid arthritis and osteoporosis.     She underwent R shoulder manipulation 2/15/22    Subjective comment: States that she is doing good.  Patient Occupation: Day care owner   Precautions and Work Restrictions: 10lb lifting restriction, still working but not doing anything heavyPain  Current pain ratin  Location: left arm    Hand dominance: left    Diagnostic Tests  X-ray: abnormal             Objective   See Exercise, Manual, and Modality Logs for complete treatment.       Assessment/Plan    Visit Diagnoses:    ICD-10-CM ICD-9-CM   1. Shoulder stiffness, left  M25.612 719.51   2. Acute pain of left shoulder  M25.512 719.41   3. Closed fracture of proximal end of left humerus, unspecified fracture morphology, initial encounter  S42.A 812.00       Progress per Plan of Care           Timed:  Manual Therapy:                 15     mins  39205  Therapeutic Exercise:      15     mins  40978     Neuromuscular reeducation       0     mins 59181  Therapeutic Activity              0     mins  93602  Ultrasound:                  0     mins  77919     Untimed:  Electrical Stimulation:    0     mins  19502 ( );  Fluidotherapy      0     mins  90697    Timed Treatment:   30   mins   Total Treatment:     30   mins    Tip Shi OT, CHT  Occupational Therapist    Electronically signed      [place] license #: [id number]

## 2022-03-14 ENCOUNTER — TREATMENT (OUTPATIENT)
Dept: PHYSICAL THERAPY | Facility: CLINIC | Age: 62
End: 2022-03-14

## 2022-03-14 DIAGNOSIS — M25.512 ACUTE PAIN OF LEFT SHOULDER: ICD-10-CM

## 2022-03-14 DIAGNOSIS — M25.612 SHOULDER STIFFNESS, LEFT: Primary | ICD-10-CM

## 2022-03-14 PROCEDURE — 97110 THERAPEUTIC EXERCISES: CPT | Performed by: OCCUPATIONAL THERAPIST

## 2022-03-14 PROCEDURE — 97140 MANUAL THERAPY 1/> REGIONS: CPT | Performed by: OCCUPATIONAL THERAPIST

## 2022-03-14 NOTE — PROGRESS NOTES
" Occupational Therapy Daily Progress Note        Patient: Shahida Arroyo   : 1960  Diagnosis/ICD-10 Code:  Shoulder stiffness, left [M25.612]  Referring practitioner: Narciso Duque MD  Date of Initial Visit: Type: THERAPY  Noted: 2/15/2022  Today's Date: 3/14/2022  Patient seen for 10 sessions             Subjective Evaluation    Pain  No pain reported         Shahida Arroyo reports: \"No pain\"    Objective     See Exercise, Manual, and Modality Logs for complete treatment.       Assessment/Plan  Pt tolerated well with no c/o pain. Provided AROM/strengthening HEP (flexion, scaption, ER, abduction). Teachback successful.   Progress per Plan of Care           Timed:  Manual Therapy:    15     mins  55011;  Therapeutic Exercise:    15     mins  74938;     Neuromuscular Maura:    0    mins  63829;    Therapeutic Activity:     0     mins  50981;     Ultrasound:     0     mins  31866;    Electrical Stimulation:    0     mins  13539;    Untimed:  Electrical Stimulation:    0     mins  98478 ( );  Fluidotherapy     0     mins  87918  Hot/cold pack     0     mins  25141    Timed Treatment:   30   mins   Total Treatment:     30   mins        Jose Kohler OT  Occupational Therapist  [place] License: [id number]  NPI: [id number]  "

## 2022-03-16 ENCOUNTER — TREATMENT (OUTPATIENT)
Dept: PHYSICAL THERAPY | Facility: CLINIC | Age: 62
End: 2022-03-16

## 2022-03-16 DIAGNOSIS — M25.612 SHOULDER STIFFNESS, LEFT: Primary | ICD-10-CM

## 2022-03-16 DIAGNOSIS — M25.512 ACUTE PAIN OF LEFT SHOULDER: ICD-10-CM

## 2022-03-16 DIAGNOSIS — S42.202A CLOSED FRACTURE OF PROXIMAL END OF LEFT HUMERUS, UNSPECIFIED FRACTURE MORPHOLOGY, INITIAL ENCOUNTER: ICD-10-CM

## 2022-03-16 PROCEDURE — 97110 THERAPEUTIC EXERCISES: CPT | Performed by: OCCUPATIONAL THERAPIST

## 2022-03-16 PROCEDURE — 97140 MANUAL THERAPY 1/> REGIONS: CPT | Performed by: OCCUPATIONAL THERAPIST

## 2022-03-16 NOTE — PROGRESS NOTES
Occupational Therapy Daily Treatment Note      Patient: Shahida Arroyo   : 1960  Referring practitioner: Narciso Duque MD  Date of Initial Visit: Type: THERAPY  Noted: 2/15/2022  Today's Date: 3/16/2022  Patient seen for 11 sessions           Subjective       Objective   See Exercise, Manual, and Modality Logs for complete treatment.       Assessment/Plan    Visit Diagnoses:    ICD-10-CM ICD-9-CM   1. Shoulder stiffness, left  M25.612 719.51   2. Acute pain of left shoulder  M25.512 719.41   3. Closed fracture of proximal end of left humerus, unspecified fracture morphology, initial encounter  S42.A 812.00       Progress per Plan of Care           Timed:  Manual Therapy:                 15     mins  26372  Therapeutic Exercise:      15     mins  39779     Neuromuscular reeducation       0     mins 35825  Therapeutic Activity              0     mins  90722  Ultrasound:                  0     mins  28735     Untimed:  Electrical Stimulation:    0     mins  43275 ( );  Fluidotherapy      0     mins  59813    Timed Treatment:   30   mins   Total Treatment:     30   mins    Tip Shi OT, CHT  Occupational Therapist    Electronically signed      [place] license #: [id number]

## 2022-03-23 ENCOUNTER — TELEPHONE (OUTPATIENT)
Dept: PHYSICAL THERAPY | Facility: CLINIC | Age: 62
End: 2022-03-23

## 2022-04-01 ENCOUNTER — OFFICE VISIT (OUTPATIENT)
Dept: FAMILY MEDICINE CLINIC | Facility: CLINIC | Age: 62
End: 2022-04-01

## 2022-04-01 VITALS
WEIGHT: 145.2 LBS | SYSTOLIC BLOOD PRESSURE: 110 MMHG | BODY MASS INDEX: 21.51 KG/M2 | HEART RATE: 82 BPM | OXYGEN SATURATION: 97 % | TEMPERATURE: 97.8 F | HEIGHT: 69 IN | DIASTOLIC BLOOD PRESSURE: 60 MMHG

## 2022-04-01 DIAGNOSIS — R09.81 SINUS CONGESTION: Primary | ICD-10-CM

## 2022-04-01 DIAGNOSIS — Z20.828 EXPOSURE TO THE FLU: ICD-10-CM

## 2022-04-01 LAB
EXPIRATION DATE: NORMAL
FLUAV AG UPPER RESP QL IA.RAPID: NOT DETECTED
FLUBV AG UPPER RESP QL IA.RAPID: NOT DETECTED
INTERNAL CONTROL: NORMAL
Lab: NORMAL
SARS-COV-2 AG UPPER RESP QL IA.RAPID: NOT DETECTED

## 2022-04-01 PROCEDURE — 87428 SARSCOV & INF VIR A&B AG IA: CPT | Performed by: FAMILY MEDICINE

## 2022-04-01 PROCEDURE — 99213 OFFICE O/P EST LOW 20 MIN: CPT | Performed by: FAMILY MEDICINE

## 2022-04-01 RX ORDER — OSELTAMIVIR PHOSPHATE 75 MG/1
75 CAPSULE ORAL DAILY
Qty: 7 CAPSULE | Refills: 0 | Status: SHIPPED | OUTPATIENT
Start: 2022-04-01 | End: 2022-04-08

## 2022-04-01 NOTE — PROGRESS NOTES
Chief Complaint  Chief Complaint   Patient presents with   • Headache   • Neck Pain   • Exposure to influenza       HPI:  Shahida Arroyo presents to [facility]     Pt reports her symptoms started 2 days ago.  Pt has a headache and back of neck hurting and some heaviness in her chest and the middle of her back and she was really shaky and no fever.  Pt has been around sick contacts with Flu A and B.    Medical History:  Past History:  Medical History: has a past medical history of Arthritis, Cystocele, unspecified (CODE), Foot pain, left (08/02/2018), Foot pain, right (08/02/2018), Heel pain, Hiatal hernia (07/07/2020), History of Clostridioides difficile colitis (07/07/2020), and Osteoporosis.   Surgical History: has a past surgical history that includes Bladder surgery; Dental surgery; Bladder repair; Cystoscopy (08/2017); Abdominal hysterectomy (08/2017); Salpingoophorectomy (08/2017); ORIF humerus fracture (Left, 9/2/2021); and Shoulder Manipulation (Left, 2/15/2022).   Family History: family history includes Breast cancer in her mother; Cancer in her father, maternal grandfather, maternal grandmother, mother, paternal grandfather, paternal grandmother, and other family members; Diabetes in her father, mother, and other family members; Heart disease in her father and mother; Stroke in her mother.   Social History: reports that she has never smoked. She has never used smokeless tobacco. She reports that she does not drink alcohol and does not use drugs.  Immunization History   Administered Date(s) Administered   • FluLaval/Fluarix/Fluzone >6 10/15/2021         Allergies: Patient has no known allergies.     Medications:  Current Outpatient Medications on File Prior to Visit   Medication Sig Dispense Refill   • phenazopyridine (Pyridium) 200 MG tablet Take 1 tablet by mouth 3 (Three) Times a Day As Needed for Bladder Spasms. 9 tablet 0   • busPIRone (BUSPAR) 5 MG tablet Take 1 tablet  "by mouth 3 (Three) Times a Day for 90 days. 270 tablet 3   • docusate sodium (COLACE) 100 MG capsule Take 1 capsule by mouth 2 (Two) Times a Day As Needed for Constipation. 20 capsule 0   • nitrofurantoin, macrocrystal-monohydrate, (Macrobid) 100 MG capsule Take 1 capsule by mouth 2 (Two) Times a Day. 14 capsule 0   • ondansetron (Zofran) 4 MG tablet Take 1 tablet by mouth Every 8 (Eight) Hours As Needed for Nausea or Vomiting. 30 tablet 0   • oxyCODONE-acetaminophen (PERCOCET) 5-325 MG per tablet Take 1 tablet by mouth Every 4 (Four) Hours As Needed for Severe Pain . 30 tablet 0     No current facility-administered medications on file prior to visit.        Health Maintenance Due   Topic Date Due   • ANNUAL PHYSICAL  Never done   • COVID-19 Vaccine (1) Never done   • TDAP/TD VACCINES (1 - Tdap) Never done   • ZOSTER VACCINE (1 of 2) Never done       Vital Signs:   Vitals:    04/01/22 1445   BP: 110/60   BP Location: Right arm   Patient Position: Sitting   Cuff Size: Adult   Pulse: 82   Temp: 97.8 °F (36.6 °C)   SpO2: 97%   Weight: 65.9 kg (145 lb 3.2 oz)   Height: 175.3 cm (69\")      Body mass index is 21.44 kg/m².     ROS:  Review of Systems   Constitutional: Negative for fatigue and fever.   HENT: Positive for congestion. Negative for ear pain and sinus pressure.    Respiratory: Negative for cough, chest tightness and shortness of breath.    Cardiovascular: Negative for chest pain, palpitations and leg swelling.   Gastrointestinal: Negative for abdominal pain and diarrhea.   Genitourinary: Negative for dysuria and frequency.   Neurological: Negative for speech difficulty, headache and confusion.   Psychiatric/Behavioral: Negative for agitation and behavioral problems.          Physical Exam  Constitutional:       Appearance: Normal appearance.   HENT:      Right Ear: Tympanic membrane normal.      Left Ear: Tympanic membrane normal.      Nose: Nose normal.   Eyes:      Extraocular Movements: Extraocular movements " intact.      Conjunctiva/sclera: Conjunctivae normal.      Pupils: Pupils are equal, round, and reactive to light.   Cardiovascular:      Rate and Rhythm: Normal rate and regular rhythm.   Pulmonary:      Effort: Pulmonary effort is normal.      Breath sounds: Normal breath sounds.   Abdominal:      General: Bowel sounds are normal.   Musculoskeletal:         General: Normal range of motion.      Cervical back: Normal range of motion.   Skin:     General: Skin is warm and dry.   Neurological:      General: No focal deficit present.      Mental Status: She is alert and oriented to person, place, and time.   Psychiatric:         Mood and Affect: Mood normal.         Behavior: Behavior normal.          Result Review   POCT SARS-CoV-2 Antigen ROSANNE + Flu (04/01/2022 14:47)       Diagnoses and all orders for this visit:    1. Sinus congestion (Primary)  -     Cancel: POCT Influenza A/B  -     Cancel: Covid-19 + Flu A&B AG, Veritor  -     POCT SARS-CoV-2 Antigen ROSANNE + Flu    2. Exposure to the flu  -     oseltamivir (Tamiflu) 75 MG capsule; Take 1 capsule by mouth Daily for 7 days.  Dispense: 7 capsule; Refill: 0          Smoking Cessation:    Shahida Arroyo  reports that she has never smoked. She has never used smokeless tobacco.          Follow Up   Return if symptoms worsen or fail to improve.  Patient was given instructions and counseling regarding her condition or for health maintenance advice. Please see specific information pulled into the AVS if appropriate.       Marjan Gonzalez MD

## 2022-05-11 ENCOUNTER — DOCUMENTATION (OUTPATIENT)
Dept: PHYSICAL THERAPY | Facility: CLINIC | Age: 62
End: 2022-05-11

## 2022-05-11 NOTE — PROGRESS NOTES
Discharge Summary  Discharge Summary from Occupational Therapy Report      Date: 5/11/22    OT visits: 10/20/21-3/16/22    Number of Visits: 21     Discharge Status of Patient: Making gradual progress.      Goals: Partially Met    Discharge Plan: Continue with current home exercise program as instructed    Comments Patient had sporadic attendance.    Last treatment date 3/16/22        Tip Shi OT  Occupational Therapist, Certified Hand Therapist

## 2022-07-01 ENCOUNTER — OFFICE VISIT (OUTPATIENT)
Dept: FAMILY MEDICINE CLINIC | Facility: CLINIC | Age: 62
End: 2022-07-01

## 2022-07-01 VITALS
HEART RATE: 79 BPM | DIASTOLIC BLOOD PRESSURE: 62 MMHG | SYSTOLIC BLOOD PRESSURE: 96 MMHG | HEIGHT: 69 IN | TEMPERATURE: 97.9 F | OXYGEN SATURATION: 97 % | WEIGHT: 141 LBS | BODY MASS INDEX: 20.88 KG/M2

## 2022-07-01 DIAGNOSIS — J02.9 SORE THROAT: Primary | ICD-10-CM

## 2022-07-01 DIAGNOSIS — R68.89 FLU-LIKE SYMPTOMS: ICD-10-CM

## 2022-07-01 LAB
EXPIRATION DATE: NORMAL
EXPIRATION DATE: NORMAL
FLUAV AG NPH QL: NEGATIVE
FLUBV AG NPH QL: NEGATIVE
INTERNAL CONTROL: NORMAL
INTERNAL CONTROL: NORMAL
Lab: NORMAL
Lab: NORMAL
S PYO AG THROAT QL: NEGATIVE
SARS-COV-2 RNA PNL SPEC NAA+PROBE: NOT DETECTED

## 2022-07-01 PROCEDURE — 87804 INFLUENZA ASSAY W/OPTIC: CPT

## 2022-07-01 PROCEDURE — 99213 OFFICE O/P EST LOW 20 MIN: CPT

## 2022-07-01 PROCEDURE — U0004 COV-19 TEST NON-CDC HGH THRU: HCPCS

## 2022-07-01 PROCEDURE — 87880 STREP A ASSAY W/OPTIC: CPT

## 2022-07-01 RX ORDER — OXYBUTYNIN CHLORIDE 5 MG/1
10 TABLET ORAL 2 TIMES DAILY
COMMUNITY

## 2022-07-01 RX ORDER — BROMPHENIRAMINE MALEATE, PSEUDOEPHEDRINE HYDROCHLORIDE, AND DEXTROMETHORPHAN HYDROBROMIDE 2; 30; 10 MG/5ML; MG/5ML; MG/5ML
10 SYRUP ORAL EVERY 4 HOURS PRN
Qty: 118 ML | Refills: 0 | Status: SHIPPED | OUTPATIENT
Start: 2022-07-01 | End: 2022-07-07 | Stop reason: SDUPTHER

## 2022-07-01 NOTE — PROGRESS NOTES
Chief Complaint  Chief Complaint   Patient presents with   • Sore Throat     Pt reports symptoms of mild chest tightness, dry cough, fatigue, no fever x2D. Pt tested at home for Covid-19 on Wednesday 06/29/2022 and Thursday 06/30/2022, both resulted negative.   • Med Refill     Bladd and muscle relaxer request   • Referral request     Dermatology Specialty       Subjective          Shahida Arroyo presents to [facility]  History of Present Illness  Patient presents today complaining of chest pressure, cough, sore throat, fatigue for the last 2 days.  She has been afebrile.  Her daughter tested positive for COVID 3 days ago.  The patient has taken 2 COVID home test on June 29 and June 30 that were both negative.  She has not received the COVID-vaccine.    She is also requesting a refill on her Ditropan and a muscle relaxer as well as a referral to dermatology.  She reports to have dry scaly areas on her skin.  She denies any redness or pruritus to these areas.  She does report a strong family history of skin cancer.  Her PCP is Dr. Gonzalez and she is due to be seen for an annual physical.    Objective     Medical History:  Past Medical History:   Diagnosis Date   • Arthritis    • Cystocele, unspecified (CODE)    • Foot pain, left 08/02/2018   • Foot pain, right 08/02/2018   • Heel pain    • Hiatal hernia 07/07/2020   • History of Clostridioides difficile colitis 07/07/2020   • Osteoporosis      Past Surgical History:   Procedure Laterality Date   • ABDOMINAL HYSTERECTOMY  08/2017   • BLADDER REPAIR      ALITITS MIDURETHRAL SLING 8/2/17   • BLADDER SURGERY     • CYSTOSCOPY  08/2017    ATILIS MIDURETHRAL SLING   • DENTAL PROCEDURE     • ORIF HUMERUS FRACTURE Left 9/2/2021    Procedure: HUMERUS OPEN  REDUCTION INTERNAL FIXATION LEFT;  Surgeon: Narciso Duque MD;  Location: [facility] MAIN OR;  Service: Orthopedics;  Laterality: Left;   • SALPINGO OOPHORECTOMY  08/2017   • SHOULDER MANIPULATION  Left 2/15/2022    Procedure: LEFT SHOULDER MANIPULATION WITH STEROID INJECTION;  Surgeon: Narciso Duque MD;  Location[place] [facility] [place] [facility];  Service: Orthopedics;  Laterality: Left;      Social History     Tobacco Use   • Smoking status: Never Smoker   • Smokeless tobacco: Never Used   Vaping Use   • Vaping Use: Never used   Substance Use Topics   • Alcohol use: Never     Alcohol/week: 1.0 standard drink     Types: 1 Glasses of wine per week   • Drug use: Never     Family History   Problem Relation Age of Onset   • Stroke Mother    • Heart disease Mother    • Cancer Mother    • Breast cancer Mother    • Diabetes Mother    • Heart disease Father    • Cancer Father    • Diabetes Father    • Cancer Maternal Grandmother    • Cancer Maternal Grandfather    • Cancer Paternal Grandmother    • Cancer Paternal Grandfather    • Cancer Other    • Diabetes Other    • Cancer Other    • Diabetes Other    • Malig Hyperthermia Neg Hx        Medications:  Prior to Admission medications    Medication Sig Start Date End Date Taking? Authorizing Provider   ondansetron (Zofran) 4 MG tablet Take 1 tablet by mouth Every 8 (Eight) Hours As Needed for Nausea or Vomiting. 2/15/22  Yes Narciso Duque MD   phenazopyridine (Pyridium) 200 MG tablet Take 1 tablet by mouth 3 (Three) Times a Day As Needed for Bladder Spasms. 2/24/22  Yes Negro Love APRN   busPIRone (BUSPAR) 5 MG tablet Take 1 tablet by mouth 3 (Three) Times a Day for 90 days. 10/15/21 1/13/22  Marjan Gonzalez MD   docusate sodium (COLACE) 100 MG capsule Take 1 capsule by mouth 2 (Two) Times a Day As Needed for Constipation. 2/15/22   Narciso Duque MD   nitrofurantoin, macrocrystal-monohydrate, (Macrobid) 100 MG capsule Take 1 capsule by mouth 2 (Two) Times a Day. 2/24/22   Negro Love APRN   oxyCODONE-acetaminophen (PERCOCET) 5-325 MG per tablet Take 1 tablet by mouth Every 4 (Four) Hours As Needed for Severe Pain . 2/15/22   Narciso Duque MD        Allergies:  "  Patient has no known allergies.    Health Maintenance Due   Topic Date Due   • COVID-19 Vaccine (1) Never done   • ANNUAL PHYSICAL  Never done   • TDAP/TD VACCINES (1 - Tdap) Never done   • ZOSTER VACCINE (1 of 2) Never done   • DXA SCAN  06/10/2022           Vital Signs:     BP 96/62 (BP Location: Left arm, Patient Position: Sitting, Cuff Size: Adult)   Pulse 79   Temp 97.9 °F (36.6 °C) (Temporal)   Ht 175.3 cm (69\")   Wt 64 kg (141 lb)   SpO2 97%   BMI 20.82 kg/m²       Physical Exam  Vitals reviewed.   Constitutional:       Appearance: Normal appearance. She is well-developed.   HENT:      Head: Normocephalic and atraumatic.      Right Ear: Tympanic membrane and external ear normal.      Left Ear: Tympanic membrane and external ear normal.      Mouth/Throat:      Pharynx: Posterior oropharyngeal erythema present. No oropharyngeal exudate.   Eyes:      Conjunctiva/sclera: Conjunctivae normal.      Pupils: Pupils are equal, round, and reactive to light.   Cardiovascular:      Rate and Rhythm: Normal rate and regular rhythm.      Heart sounds: No murmur heard.    No friction rub. No gallop.   Pulmonary:      Effort: Pulmonary effort is normal.      Breath sounds: Normal breath sounds. No wheezing or rhonchi.   Abdominal:      General: Bowel sounds are normal. There is no distension.      Palpations: Abdomen is soft.      Tenderness: There is no abdominal tenderness.   Skin:     General: Skin is warm and dry.   Neurological:      Mental Status: She is alert and oriented to person, place, and time.      Cranial Nerves: No cranial nerve deficit.   Psychiatric:         Mood and Affect: Mood and affect normal.         Behavior: Behavior normal.         Thought Content: Thought content normal.         Judgment: Judgment normal.          Result Review :     Influenza A&B    [facility] 7/1/22   Rapid Influenza A Ag Negative   Rapid Influenza B Ag Negative           Strep    [facility] 7/1/22   POC Strep A, " Molecular Negative                       Assessment and Plan    Diagnoses and all orders for this visit:    1. Sore throat (Primary)  -     POCT rapid strep A  -     COVID-19,APTIMA PANTHER(CHIOMA),[facility]/ [facility]REW, NP/OP SWAB IN UTM/VTM/[facility] TRANSPORT MEDIA,24 HR TAT - Swab, Nasopharynx    2. Flu-like symptoms  Comments:  COVID test sent off.  Will notify patient of results.  Encouraged to isolate herself, drink plenty of fluids, take over-the-counter flu and cold medication.   Orders:  -     POCT Influenza A/B    Other orders  -     brompheniramine-pseudoephedrine-DM 30-2-10 MG/5ML syrup; Take 10 mL by mouth Every 4 (Four) Hours As Needed for Cough or Allergies.  Dispense: 118 mL; Refill: 0    Patient will make a follow-up appointment with Dr. Gonzalez to address her request for Ditropan and muscle relaxer refills.  She is also requesting a referral to dermatology for dry scaly areas on her legs.  She does have a strong family history of skin cancer.        Smoking Cessation:    Shahida Arroyo  reports that she has never smoked. She has never used smokeless tobacco.          Follow Up   Return if symptoms worsen or fail to improve.  Patient was given instructions and counseling regarding her condition or for health maintenance advice. Please see specific information pulled into the AVS if appropriate.

## 2022-07-07 ENCOUNTER — TELEPHONE (OUTPATIENT)
Dept: FAMILY MEDICINE CLINIC | Facility: CLINIC | Age: 62
End: 2022-07-07

## 2022-07-07 RX ORDER — BROMPHENIRAMINE MALEATE, PSEUDOEPHEDRINE HYDROCHLORIDE, AND DEXTROMETHORPHAN HYDROBROMIDE 2; 30; 10 MG/5ML; MG/5ML; MG/5ML
10 SYRUP ORAL EVERY 4 HOURS PRN
Qty: 300 ML | Refills: 0 | Status: SHIPPED | OUTPATIENT
Start: 2022-07-07 | End: 2022-09-22

## 2022-07-07 NOTE — TELEPHONE ENCOUNTER
DELETE AFTER REVIEWING: Telephone encounter to be sent to the clinical pool.    Pharmacy Name:  [facility]     Pharmacy representative phone number: [phone]    What medication are you calling in regards to: BROMPHENIRAMINE    What question does the pharmacy have: THIS MEDICATION WAS WRITTEN BY FRANKIE ROBERSON AND SHE KNOW LONGER HAS AN ACTIVE LICENCE.  PHARMACY WANTED TO SEE IF A PCP IN THE OFFICE WOULD WRITE THIS PRESCRIPTION     Who is the provider that prescribed the medication: FRANKIE ROBERSON

## 2022-07-28 ENCOUNTER — TELEPHONE (OUTPATIENT)
Dept: FAMILY MEDICINE CLINIC | Facility: CLINIC | Age: 62
End: 2022-07-28

## 2022-07-28 NOTE — TELEPHONE ENCOUNTER
HUB TO READ   Lm for pt to return call to get patient r/s with another provider to get her pap completed.   Dr. Gonzalez is unable to do paps do to her shoulder surgery

## 2022-08-09 ENCOUNTER — TELEPHONE (OUTPATIENT)
Dept: FAMILY MEDICINE CLINIC | Facility: CLINIC | Age: 62
End: 2022-08-09

## 2022-08-09 NOTE — TELEPHONE ENCOUNTER
Patient missed appointment on 08/01/2022 @ 0845 with Dr Gonzalez. Called first number in chart [phone]. No answer-left message explaining policy concerning missed appointments and same day cancellations

## 2022-08-10 ENCOUNTER — TELEPHONE (OUTPATIENT)
Dept: FAMILY MEDICINE CLINIC | Facility: CLINIC | Age: 62
End: 2022-08-10

## 2022-08-10 NOTE — TELEPHONE ENCOUNTER
Patient missed appointment on 08/01/2022 @ 0845 with Dr. Gonzalez. Called number in chart. No answer, second attempt, sending letter.

## 2022-09-22 ENCOUNTER — OFFICE VISIT (OUTPATIENT)
Dept: FAMILY MEDICINE CLINIC | Facility: CLINIC | Age: 62
End: 2022-09-22

## 2022-09-22 VITALS
WEIGHT: 139.8 LBS | TEMPERATURE: 98.4 F | BODY MASS INDEX: 20.71 KG/M2 | HEART RATE: 67 BPM | DIASTOLIC BLOOD PRESSURE: 54 MMHG | HEIGHT: 69 IN | SYSTOLIC BLOOD PRESSURE: 98 MMHG | OXYGEN SATURATION: 99 %

## 2022-09-22 DIAGNOSIS — R30.0 DYSURIA: Primary | ICD-10-CM

## 2022-09-22 DIAGNOSIS — M72.0 DUPUYTREN'S CONTRACTURE OF LEFT HAND: ICD-10-CM

## 2022-09-22 LAB
BILIRUB BLD-MCNC: NEGATIVE MG/DL
CLARITY, POC: ABNORMAL
COLOR UR: ABNORMAL
GLUCOSE UR STRIP-MCNC: NEGATIVE MG/DL
KETONES UR QL: NEGATIVE
LEUKOCYTE EST, POC: ABNORMAL
NITRITE UR-MCNC: NEGATIVE MG/ML
PH UR: 5.5 [PH] (ref 5–8)
PROT UR STRIP-MCNC: NEGATIVE MG/DL
RBC # UR STRIP: ABNORMAL /UL
SP GR UR: 1.03 (ref 1–1.03)
UROBILINOGEN UR QL: ABNORMAL

## 2022-09-22 PROCEDURE — 99214 OFFICE O/P EST MOD 30 MIN: CPT | Performed by: NURSE PRACTITIONER

## 2022-09-22 RX ORDER — NITROFURANTOIN 25; 75 MG/1; MG/1
100 CAPSULE ORAL 2 TIMES DAILY
Qty: 14 CAPSULE | Refills: 0 | Status: SHIPPED | OUTPATIENT
Start: 2022-09-22 | End: 2022-11-16

## 2022-09-22 NOTE — PROGRESS NOTES
"Chief Complaint  Hand Pain (Left ) and Difficulty Urinating (Burning when urinating )    Subjective        Shahida Arroyo presents to [facility]  History of Present Illness  Presents today for an acute visit for possible UTI and hand contracture.  Patient reports over the past week she has been having dysuria and urinary frequency and back pain.  Denies fever and chills.    She reports since her arm being fractured that she has been having tightness in her hands and also nodule in her her palm to the fourth digit.    Objective   Vital Signs:  BP 98/54   Pulse 67   Temp 98.4 °F (36.9 °C)   Ht 175.3 cm (69\")   Wt 63.4 kg (139 lb 12.8 oz)   SpO2 99%   BMI 20.64 kg/m²   Estimated body mass index is 20.64 kg/m² as calculated from the following:    Height as of this encounter: 175.3 cm (69\").    Weight as of this encounter: 63.4 kg (139 lb 12.8 oz).    BMI is within normal parameters. No other follow-up for BMI required.      Physical Exam  Vitals reviewed.   Constitutional:       Appearance: Normal appearance. She is well-developed.   HENT:      Head: Normocephalic and atraumatic.      Right Ear: External ear normal.      Left Ear: External ear normal.      Mouth/Throat:      Pharynx: No oropharyngeal exudate.   Eyes:      Conjunctiva/sclera: Conjunctivae normal.      Pupils: Pupils are equal, round, and reactive to light.   Cardiovascular:      Rate and Rhythm: Normal rate and regular rhythm.      Heart sounds: No murmur heard.    No friction rub. No gallop.   Pulmonary:      Effort: Pulmonary effort is normal.      Breath sounds: Normal breath sounds. No wheezing or rhonchi.   Abdominal:      General: Bowel sounds are normal. There is no distension.      Palpations: Abdomen is soft.      Tenderness: There is no abdominal tenderness.   Musculoskeletal:      Left hand: No swelling, deformity, lacerations or tenderness. Decreased range of motion. Decreased strength. Normal sensation. "      Comments: Dupuytren's contracture with a nodule in the 4th digit tendon.   Skin:     General: Skin is warm and dry.   Neurological:      Mental Status: She is alert and oriented to person, place, and time.        Result Review :                Assessment and Plan   Diagnoses and all orders for this visit:    1. Dysuria (Primary)  -     POCT urinalysis dipstick, manual  -     Urine Culture - Urine, Urine, Clean Catch; Future    2. Dupuytren's contracture of left hand  -     Ambulatory Referral to Hand Surgery    Other orders  -     nitrofurantoin, macrocrystal-monohydrate, (Macrobid) 100 MG capsule; Take 1 capsule by mouth 2 (Two) Times a Day.  Dispense: 14 capsule; Refill: 0    Send urine for culture and sensitivity.  Prescribed Macrobid 100 mg twice daily for the next week.  Patient declines nuchal reports steroid injection around nodule in hand for Dupuytren's contracture.  Will consult hand specialist         Follow Up   Return if symptoms worsen or fail to improve.  Patient was given instructions and counseling regarding her condition or for health maintenance advice. Please see specific information pulled into the AVS if appropriate.

## 2022-11-16 ENCOUNTER — OFFICE VISIT (OUTPATIENT)
Dept: FAMILY MEDICINE CLINIC | Facility: CLINIC | Age: 62
End: 2022-11-16

## 2022-11-16 VITALS
OXYGEN SATURATION: 98 % | TEMPERATURE: 97.9 F | BODY MASS INDEX: 21.77 KG/M2 | HEART RATE: 89 BPM | SYSTOLIC BLOOD PRESSURE: 102 MMHG | DIASTOLIC BLOOD PRESSURE: 64 MMHG | HEIGHT: 69 IN | WEIGHT: 147 LBS

## 2022-11-16 DIAGNOSIS — J06.9 VIRAL URI: Primary | ICD-10-CM

## 2022-11-16 DIAGNOSIS — J02.9 ACUTE PHARYNGITIS, UNSPECIFIED ETIOLOGY: ICD-10-CM

## 2022-11-16 LAB
EXPIRATION DATE: NORMAL
EXPIRATION DATE: NORMAL
FLUAV AG UPPER RESP QL IA.RAPID: NOT DETECTED
FLUBV AG UPPER RESP QL IA.RAPID: NOT DETECTED
INTERNAL CONTROL: NORMAL
INTERNAL CONTROL: NORMAL
Lab: NORMAL
Lab: NORMAL
S PYO AG THROAT QL: NEGATIVE
SARS-COV-2 AG UPPER RESP QL IA.RAPID: NOT DETECTED

## 2022-11-16 PROCEDURE — 99213 OFFICE O/P EST LOW 20 MIN: CPT | Performed by: NURSE PRACTITIONER

## 2022-11-16 PROCEDURE — 87428 SARSCOV & INF VIR A&B AG IA: CPT | Performed by: NURSE PRACTITIONER

## 2022-11-16 PROCEDURE — 87880 STREP A ASSAY W/OPTIC: CPT | Performed by: NURSE PRACTITIONER

## 2022-11-16 NOTE — PROGRESS NOTES
"Chief Complaint  Flu Symptoms, Chest Pressure, Generalized Body Aches, Headache, and Sore Throat    Subjective         Shahida Arroyo presents to [facility]  URI   This is a new problem. The current episode started yesterday. The problem has been gradually worsening. There has been no fever. Associated symptoms include headaches, joint pain and a sore throat. Pertinent negatives include no chest pain, congestion, coughing, diarrhea, nausea, plugged ear sensation, rhinorrhea, sinus pain, vomiting or wheezing. Associated symptoms comments: Chest pressure. She has tried nothing for the symptoms. The treatment provided no relief.          Social History     Socioeconomic History   • Marital status:    Tobacco Use   • Smoking status: Never   • Smokeless tobacco: Never   Vaping Use   • Vaping Use: Never used   Substance and Sexual Activity   • Alcohol use: Never     Alcohol/week: 1.0 standard drink     Types: 1 Glasses of wine per week   • Drug use: Never   • Sexual activity: Not Currently        Objective     Vitals:    11/16/22 0906   BP: 102/64   Pulse: 89   Temp: 97.9 °F (36.6 °C)   SpO2: 98%   Weight: 66.7 kg (147 lb)   Height: 175.3 cm (69\")        Body mass index is 21.71 kg/m².    Wt Readings from Last 3 Encounters:   11/16/22 66.7 kg (147 lb)   09/22/22 63.4 kg (139 lb 12.8 oz)   07/01/22 64 kg (141 lb)       BP Readings from Last 3 Encounters:   11/16/22 102/64   09/22/22 98/54   07/01/22 96/62         BMI is within normal parameters. No other follow-up for BMI required.        Physical Exam  Vitals reviewed.   Constitutional:       Appearance: Normal appearance. She is well-developed.   HENT:      Head: Normocephalic and atraumatic.      Right Ear: Tympanic membrane and external ear normal.      Left Ear: Tympanic membrane and external ear normal.      Mouth/Throat:      Pharynx: No oropharyngeal exudate or posterior oropharyngeal erythema.   Eyes:      Conjunctiva/sclera: " Conjunctivae normal.      Pupils: Pupils are equal, round, and reactive to light.   Cardiovascular:      Rate and Rhythm: Normal rate and regular rhythm.      Heart sounds: No murmur heard.    No friction rub. No gallop.   Pulmonary:      Effort: Pulmonary effort is normal.      Breath sounds: Normal breath sounds. No wheezing or rhonchi.   Musculoskeletal:      Cervical back: Normal range of motion. No tenderness.   Lymphadenopathy:      Cervical: No cervical adenopathy.   Skin:     General: Skin is warm and dry.   Neurological:      Mental Status: She is alert and oriented to person, place, and time.          Result Review :   The following data was reviewed by: GABE Aguilera on 11/16/2022:  SARS Antigen   Date Value Ref Range Status   11/16/2022 Not Detected Not Detected, Presumptive Negative Final     Influenza A Antigen ROSANNE   Date Value Ref Range Status   11/16/2022 Not Detected Not Detected Final     Influenza B Antigen ROSANNE   Date Value Ref Range Status   11/16/2022 Not Detected Not Detected Final     Internal Control   Date Value Ref Range Status   11/16/2022 Passed Passed Final     Lot Number   Date Value Ref Range Status   11/16/2022 2,[id number]  Final     Expiration Date   Date Value Ref Range Status   11/16/2022 5/30/2023  Final     Strep    Common Labsle 11/16/22   POC Strep A, Molecular Negative             Procedures    Assessment and Plan   Diagnoses and all orders for this visit:    1. Viral URI (Primary)  -     POCT SARS-CoV-2 Antigen ROSANNE + Flu    2. Acute pharyngitis, unspecified etiology  -     POCT rapid strep A      Discussed symptomatic treatment including throat lozenges warm fluids.  If she develops congestion and antihistamine and if she develops a cough cough suppressant.      Follow Up   Return if symptoms worsen or fail to improve.  Patient was given instructions and counseling regarding her condition or for health maintenance advice. Please see specific information pulled into the  AVS if appropriate.     Please note that portions of this note were completed with a voice recognition program.

## 2022-11-30 ENCOUNTER — CLINICAL SUPPORT (OUTPATIENT)
Dept: FAMILY MEDICINE CLINIC | Facility: CLINIC | Age: 62
End: 2022-11-30

## 2022-11-30 DIAGNOSIS — Z23 NEED FOR INFLUENZA VACCINATION: Primary | ICD-10-CM

## 2022-11-30 DIAGNOSIS — Z11.1 TUBERCULIN SKIN TEST ENCOUNTER: ICD-10-CM

## 2022-11-30 PROCEDURE — 86580 TB INTRADERMAL TEST: CPT | Performed by: FAMILY MEDICINE

## 2022-11-30 PROCEDURE — 90686 IIV4 VACC NO PRSV 0.5 ML IM: CPT | Performed by: FAMILY MEDICINE

## 2022-11-30 PROCEDURE — 90471 IMMUNIZATION ADMIN: CPT | Performed by: FAMILY MEDICINE

## 2022-11-30 NOTE — PROGRESS NOTES
Pt came into office for TB skin test and a flu shot . Pt to return on Friday after 3;15 for reading of TB Lt forearm.

## 2022-12-02 LAB
INDURATION: 0 MM (ref 0–10)
Lab: NORMAL
Lab: NORMAL
TB SKIN TEST: NEGATIVE

## 2023-01-12 DIAGNOSIS — C43.9 MALIGNANT MELANOMA OF SKIN: Primary | ICD-10-CM

## 2023-01-13 DIAGNOSIS — C43.9 MALIGNANT MELANOMA, UNSPECIFIED SITE: Primary | ICD-10-CM

## 2023-01-26 ENCOUNTER — TELEPHONE (OUTPATIENT)
Dept: FAMILY MEDICINE CLINIC | Facility: CLINIC | Age: 63
End: 2023-01-26
Payer: COMMERCIAL

## 2023-01-26 NOTE — TELEPHONE ENCOUNTER
Attempted to reschedule patient's 1/27/2023 appointment to a different date or change to MyChart video visit . The provider is unavailable.     Please transfer to office regarding this visit.

## 2023-01-27 ENCOUNTER — TELEMEDICINE (OUTPATIENT)
Dept: FAMILY MEDICINE CLINIC | Facility: CLINIC | Age: 63
End: 2023-01-27
Payer: COMMERCIAL

## 2023-02-08 ENCOUNTER — OFFICE VISIT (OUTPATIENT)
Dept: FAMILY MEDICINE CLINIC | Facility: CLINIC | Age: 63
End: 2023-02-08
Payer: COMMERCIAL

## 2023-02-08 VITALS
OXYGEN SATURATION: 97 % | TEMPERATURE: 97.9 F | SYSTOLIC BLOOD PRESSURE: 120 MMHG | DIASTOLIC BLOOD PRESSURE: 70 MMHG | HEIGHT: 69 IN | BODY MASS INDEX: 21.48 KG/M2 | WEIGHT: 145 LBS | HEART RATE: 80 BPM | RESPIRATION RATE: 14 BRPM

## 2023-02-08 DIAGNOSIS — C43.59 MALIGNANT MELANOMA OF BACK: Primary | ICD-10-CM

## 2023-02-08 DIAGNOSIS — R07.89 OTHER CHEST PAIN: ICD-10-CM

## 2023-02-08 PROCEDURE — 99214 OFFICE O/P EST MOD 30 MIN: CPT | Performed by: FAMILY MEDICINE

## 2023-02-08 PROCEDURE — 93000 ELECTROCARDIOGRAM COMPLETE: CPT | Performed by: FAMILY MEDICINE

## 2023-02-08 RX ORDER — OMEPRAZOLE 40 MG/1
40 CAPSULE, DELAYED RELEASE ORAL DAILY
Qty: 90 CAPSULE | Refills: 1 | Status: SHIPPED | OUTPATIENT
Start: 2023-02-08 | End: 2023-05-09

## 2023-02-08 NOTE — PROGRESS NOTES
Chief Complaint  Chief Complaint   Patient presents with   • Spasms   • Chest Pain   • Back Pain   • Shortness of Breath       HPI:  Shahida Arroyo presents to [facility]     Pt reports she is having sharp stabbing chest pains and pain in the back of her neck and nausea and weird feeling in her stomach that is a tickling and fuzzy feeling and leg spasms.  Patient had an EKG in the office today that was normal did not point to any cardiac pathology for her chest pain.    Patient was just recently diagnosed with malignant melanoma of the back by dermatology biopsy.  Patient is scheduled to have surgery to remove a lymph node that became enlarged immediately after her biopsy. Due to her recent diagnosis of malignant melanoma and her current complaint of chest pain started in the front and radiating to her back I will be getting a CT of the chest.  I also found the patient that due to her recent condition she may be more anxious than normal and producing extra stomach acid so we will be sending her heartburn medication to see if that may help control some of her chest pain.      ECG 12 Lead    Date/Time: 2/8/2023 2:10 PM  Performed by: Marjan Gonzalez MD  Authorized by: Marjan Gonzalez MD   Comparison: not compared with previous ECG   Rhythm: sinus rhythm  Rate: normal  ST Segments: ST segments normal  QRS axis: normal    Clinical impression: normal ECG             Past History:  Medical History: has a past medical history of Arthritis, Cystocele, unspecified (CODE), Foot pain, left (08/02/2018), Foot pain, right (08/02/2018), Heel pain, Hiatal hernia (07/07/2020), History of Clostridioides difficile colitis (07/07/2020), and Osteoporosis.   Surgical History: has a past surgical history that includes Bladder surgery; Dental surgery; Bladder repair; Cystoscopy (08/2017); Abdominal hysterectomy (08/2017); Salpingoophorectomy (08/2017); ORIF humerus fracture (Left, 9/2/2021); and Shoulder  "Manipulation (Left, 2/15/2022).   Family History: family history includes Breast cancer in her mother; Cancer in her father, maternal grandfather, maternal grandmother, mother, paternal grandfather, paternal grandmother, and other family members; Diabetes in her father, mother, and other family members; Heart disease in her father and mother; Stroke in her mother.   Social History: reports that she has never smoked. She has never used smokeless tobacco. She reports that she does not drink alcohol and does not use drugs.  Immunization History   Administered Date(s) Administered   • FluLaval/Fluzone >6mos 10/15/2021, 11/30/2022   • PPD Test 11/30/2022         Allergies: Patient has no known allergies.     Medications:  Current Outpatient Medications on File Prior to Visit   Medication Sig Dispense Refill   • busPIRone (BUSPAR) 5 MG tablet Take 1 tablet by mouth 3 (Three) Times a Day for 90 days. 270 tablet 3   • oxybutynin (DITROPAN) 5 MG tablet Take 10 mg by mouth 2 (Two) Times a Day.       No current facility-administered medications on file prior to visit.        Health Maintenance Due   Topic Date Due   • COVID-19 Vaccine (1) Never done   • TDAP/TD VACCINES (1 - Tdap) Never done   • ZOSTER VACCINE (1 of 2) Never done   • ANNUAL PHYSICAL  Never done   • DXA SCAN  06/10/2022       Vital Signs:   Vitals:    02/08/23 1259   BP: 120/70   BP Location: Left arm   Patient Position: Sitting   Pulse: 80   Resp: 14   Temp: 97.9 °F (36.6 °C)   SpO2: 97%   Weight: 65.8 kg (145 lb)   Height: 175.3 cm (69\")      Body mass index is 21.41 kg/m².     ROS:  Review of Systems   Constitutional: Negative for fatigue and fever.   HENT: Negative for congestion, ear pain and sinus pressure.    Respiratory: Positive for shortness of breath. Negative for cough and chest tightness.    Cardiovascular: Positive for chest pain. Negative for palpitations and leg swelling.   Gastrointestinal: Negative for abdominal pain and diarrhea. "   Genitourinary: Negative for dysuria and frequency.   Neurological: Negative for speech difficulty, headache and confusion.   Psychiatric/Behavioral: Negative for agitation and behavioral problems.          Physical Exam  Vitals reviewed.   Constitutional:       Appearance: Normal appearance.   HENT:      Right Ear: Tympanic membrane normal.      Left Ear: Tympanic membrane normal.      Nose: Nose normal.   Eyes:      Extraocular Movements: Extraocular movements intact.      Conjunctiva/sclera: Conjunctivae normal.      Pupils: Pupils are equal, round, and reactive to light.   Cardiovascular:      Rate and Rhythm: Normal rate and regular rhythm.   Pulmonary:      Effort: Pulmonary effort is normal. No respiratory distress.      Breath sounds: Normal breath sounds. No wheezing.   Abdominal:      General: Bowel sounds are normal.   Musculoskeletal:         General: Normal range of motion.      Cervical back: Normal range of motion.   Skin:     General: Skin is warm and dry.   Neurological:      General: No focal deficit present.      Mental Status: She is alert and oriented to person, place, and time.   Psychiatric:         Mood and Affect: Mood normal.         Behavior: Behavior normal.          Result Review   PROGRESS NOTES - SCAN - [facility] 1/11/2023 Progress Note (01/05/2023)       Diagnoses and all orders for this visit:    1. Malignant melanoma of back (HCC) (Primary)  -     Ambulatory Referral to Hematology / Oncology    2. Other chest pain  -     CT Chest Without Contrast; Future  -     omeprazole (priLOSEC) 40 MG capsule; Take 1 capsule by mouth Daily for 90 days.  Dispense: 90 capsule; Refill: 1    Other orders  -     ECG 12 Lead          Smoking Cessation:    Shahida Arroyo  reports that she has never smoked. She has never used smokeless tobacco.          Follow Up   Return in about 4 weeks (around 3/8/2023).  Patient was given instructions and counseling regarding her condition or for  health maintenance advice. Please see specific information pulled into the AVS if appropriate.       Marjan Gonzalez MD

## 2023-02-10 ENCOUNTER — TELEPHONE (OUTPATIENT)
Dept: FAMILY MEDICINE CLINIC | Facility: CLINIC | Age: 63
End: 2023-02-10

## 2023-02-10 ENCOUNTER — TELEPHONE (OUTPATIENT)
Dept: FAMILY MEDICINE CLINIC | Facility: CLINIC | Age: 63
End: 2023-02-10
Payer: COMMERCIAL

## 2023-02-10 ENCOUNTER — HOSPITAL ENCOUNTER (OUTPATIENT)
Dept: CT IMAGING | Facility: HOSPITAL | Age: 63
Discharge: HOME | End: 2023-02-10
Admitting: FAMILY MEDICINE
Payer: COMMERCIAL

## 2023-02-10 DIAGNOSIS — R07.89 OTHER CHEST PAIN: ICD-10-CM

## 2023-02-10 PROCEDURE — 71250 CT THORAX DX C-: CPT

## 2023-02-10 NOTE — TELEPHONE ENCOUNTER
Marisol from Radiology called with results from CT of the chest for patient. [phone].    Please advise

## 2023-02-10 NOTE — TELEPHONE ENCOUNTER
Called and spoke to Erin and she stated results of report , Informed her we have received and I let Dr Gonzalez know. Dr Gonzalez has referred pt to [facility]. If appt is Monday she will let them order PET Scan

## 2023-02-10 NOTE — TELEPHONE ENCOUNTER
Patient CT scan came back and radiologist called over to inform us of the results.  Patient CT scan shows that there is a 8 mm lung nodule in the right side lung and they are recommending a PET CT.  I called and informed the patient of these findings and found that her phone number that is in the chart was also wrong for we have corrected that.  Patient also is being referred to [facility] which we will be updating them with the most recent CT result.  We will be deferring our PET/CT ordered to [facility] states they will be working her in as a STAT consultation.

## 2023-02-13 ENCOUNTER — TELEPHONE (OUTPATIENT)
Dept: FAMILY MEDICINE CLINIC | Facility: CLINIC | Age: 63
End: 2023-02-13

## 2023-02-13 DIAGNOSIS — C43.9 MALIGNANT MELANOMA, UNSPECIFIED SITE: Primary | ICD-10-CM

## 2023-02-13 NOTE — TELEPHONE ENCOUNTER
Caller: Shahida Arroyo    Relationship: Self    Best call back number: [phone]    Who are you requesting to speak with (clinical staff, provider,  specific staff member): DR ELIZALDE    What was the call regarding: PATIENT STATES THE WAS AN ISSUE WITH THE REFERRAL TO THE [facility] AND SHE WOULD LIKE TO SPEAK WITH DR ELIZALDE DIRECTLY ABOUT IT.    Do you require a callback: YES

## 2023-02-13 NOTE — TELEPHONE ENCOUNTER
Caller: Shahida Arroyo    Relationship: Self    Best call back number: [phone]    What is the best time to reach you: ANY     Who are you requesting to speak with (clinical staff, provider,  specific staff member): CLINICAL       What was the call regarding: PATIENT IS WANTING TO CHECK ON THE STATUS OF HER REFERRAL FOR THE PET SCAN. SHE IS ALSO WANTING TO KNOW IF SHE SHOULD CANCEL HER SURGERY WITH ENT. PLEASE ADVISE.     Do you require a callback: YES

## 2023-02-28 ENCOUNTER — TELEPHONE (OUTPATIENT)
Dept: FAMILY MEDICINE CLINIC | Facility: CLINIC | Age: 63
End: 2023-02-28

## 2023-02-28 NOTE — TELEPHONE ENCOUNTER
Caller: Shahida Arroyo    Relationship: Self    Best call back number: [phone]    What test was performed: CAT SCAN    When was the test performed: 2.28.2023    Additional notes: PATIENT WOULD LIKE A CALL BACK WITH THESE RESULTS.

## 2023-03-16 ENCOUNTER — OFFICE VISIT (OUTPATIENT)
Dept: FAMILY MEDICINE CLINIC | Facility: CLINIC | Age: 63
End: 2023-03-16
Payer: COMMERCIAL

## 2023-03-16 VITALS
OXYGEN SATURATION: 98 % | TEMPERATURE: 97.9 F | SYSTOLIC BLOOD PRESSURE: 110 MMHG | RESPIRATION RATE: 14 BRPM | HEIGHT: 69 IN | WEIGHT: 148 LBS | BODY MASS INDEX: 21.92 KG/M2 | HEART RATE: 80 BPM | DIASTOLIC BLOOD PRESSURE: 68 MMHG

## 2023-03-16 DIAGNOSIS — M41.9 SCOLIOSIS OF THORACOLUMBAR SPINE, UNSPECIFIED SCOLIOSIS TYPE: ICD-10-CM

## 2023-03-16 DIAGNOSIS — G89.29 CHRONIC MIDLINE LOW BACK PAIN WITHOUT SCIATICA: ICD-10-CM

## 2023-03-16 DIAGNOSIS — M54.50 CHRONIC MIDLINE LOW BACK PAIN WITHOUT SCIATICA: ICD-10-CM

## 2023-03-16 DIAGNOSIS — Z12.31 BREAST CANCER SCREENING BY MAMMOGRAM: Primary | ICD-10-CM

## 2023-03-16 DIAGNOSIS — F41.9 ANXIETY: ICD-10-CM

## 2023-03-16 DIAGNOSIS — M85.88 OSTEOPENIA OF LUMBAR SPINE: ICD-10-CM

## 2023-03-16 DIAGNOSIS — M41.80 DEXTROSCOLIOSIS: ICD-10-CM

## 2023-03-16 DIAGNOSIS — R79.89 ABNORMAL CBC: ICD-10-CM

## 2023-03-16 DIAGNOSIS — C43.59 MALIGNANT MELANOMA OF BACK: ICD-10-CM

## 2023-03-16 LAB
25(OH)D3 SERPL-MCNC: 35 NG/ML (ref 30–100)
ALBUMIN SERPL-MCNC: 4.4 G/DL (ref 3.5–5.2)
ALBUMIN/GLOB SERPL: 1.6 G/DL
ALP SERPL-CCNC: 87 U/L (ref 39–117)
ALT SERPL W P-5'-P-CCNC: 16 U/L (ref 1–33)
ANION GAP SERPL CALCULATED.3IONS-SCNC: 10 MMOL/L (ref 5–15)
AST SERPL-CCNC: 21 U/L (ref 1–32)
BASOPHILS # BLD AUTO: 0.05 10*3/MM3 (ref 0–0.2)
BASOPHILS NFR BLD AUTO: 0.8 % (ref 0–1.5)
BILIRUB SERPL-MCNC: <0.2 MG/DL (ref 0–1.2)
BUN SERPL-MCNC: 15 MG/DL (ref 8–23)
BUN/CREAT SERPL: 16 (ref 7–25)
CALCIUM SPEC-SCNC: 10.1 MG/DL (ref 8.6–10.5)
CHLORIDE SERPL-SCNC: 105 MMOL/L (ref 98–107)
CO2 SERPL-SCNC: 28 MMOL/L (ref 22–29)
CREAT SERPL-MCNC: 0.94 MG/DL (ref 0.57–1)
DEPRECATED RDW RBC AUTO: 38.6 FL (ref 37–54)
EGFRCR SERPLBLD CKD-EPI 2021: 69.6 ML/MIN/1.73
EOSINOPHIL # BLD AUTO: 0.12 10*3/MM3 (ref 0–0.4)
EOSINOPHIL NFR BLD AUTO: 1.8 % (ref 0.3–6.2)
ERYTHROCYTE [DISTWIDTH] IN BLOOD BY AUTOMATED COUNT: 11.8 % (ref 12.3–15.4)
GLOBULIN UR ELPH-MCNC: 2.8 GM/DL
GLUCOSE SERPL-MCNC: 114 MG/DL (ref 65–99)
HCT VFR BLD AUTO: 38.5 % (ref 34–46.6)
HGB BLD-MCNC: 13 G/DL (ref 12–15.9)
IMM GRANULOCYTES # BLD AUTO: 0.01 10*3/MM3 (ref 0–0.05)
IMM GRANULOCYTES NFR BLD AUTO: 0.2 % (ref 0–0.5)
LYMPHOCYTES # BLD AUTO: 2.17 10*3/MM3 (ref 0.7–3.1)
LYMPHOCYTES NFR BLD AUTO: 32.8 % (ref 19.6–45.3)
MCH RBC QN AUTO: 30 PG (ref 26.6–33)
MCHC RBC AUTO-ENTMCNC: 33.8 G/DL (ref 31.5–35.7)
MCV RBC AUTO: 88.9 FL (ref 79–97)
MONOCYTES # BLD AUTO: 0.77 10*3/MM3 (ref 0.1–0.9)
MONOCYTES NFR BLD AUTO: 11.6 % (ref 5–12)
NEUTROPHILS NFR BLD AUTO: 3.49 10*3/MM3 (ref 1.7–7)
NEUTROPHILS NFR BLD AUTO: 52.8 % (ref 42.7–76)
NRBC BLD AUTO-RTO: 0 /100 WBC (ref 0–0.2)
PLATELET # BLD AUTO: 248 10*3/MM3 (ref 140–450)
PMV BLD AUTO: 11.3 FL (ref 6–12)
POTASSIUM SERPL-SCNC: 4.2 MMOL/L (ref 3.5–5.2)
PROT SERPL-MCNC: 7.2 G/DL (ref 6–8.5)
RBC # BLD AUTO: 4.33 10*6/MM3 (ref 3.77–5.28)
SODIUM SERPL-SCNC: 143 MMOL/L (ref 136–145)
WBC NRBC COR # BLD: 6.61 10*3/MM3 (ref 3.4–10.8)

## 2023-03-16 PROCEDURE — 1159F MED LIST DOCD IN RCRD: CPT | Performed by: FAMILY MEDICINE

## 2023-03-16 PROCEDURE — 82306 VITAMIN D 25 HYDROXY: CPT | Performed by: FAMILY MEDICINE

## 2023-03-16 PROCEDURE — 99214 OFFICE O/P EST MOD 30 MIN: CPT | Performed by: FAMILY MEDICINE

## 2023-03-16 PROCEDURE — 85025 COMPLETE CBC W/AUTO DIFF WBC: CPT | Performed by: FAMILY MEDICINE

## 2023-03-16 PROCEDURE — 1160F RVW MEDS BY RX/DR IN RCRD: CPT | Performed by: FAMILY MEDICINE

## 2023-03-16 PROCEDURE — 80053 COMPREHEN METABOLIC PANEL: CPT | Performed by: FAMILY MEDICINE

## 2023-03-16 RX ORDER — ALENDRONATE SODIUM 70 MG/1
70 TABLET ORAL
Qty: 12 TABLET | Refills: 0 | Status: SHIPPED | OUTPATIENT
Start: 2023-03-16 | End: 2023-06-14

## 2023-03-16 RX ORDER — BUSPIRONE HYDROCHLORIDE 5 MG/1
5 TABLET ORAL 3 TIMES DAILY
Qty: 270 TABLET | Refills: 3 | Status: SHIPPED | OUTPATIENT
Start: 2023-03-16 | End: 2023-06-14

## 2023-03-16 NOTE — PROGRESS NOTES
Chief Complaint  Chief Complaint   Patient presents with   • Skin Lesion     1 month f/u    • Med Refill     All meds refill ed        HPI:  Shahida Arroyo presents to [facility]     Melanoma of the back-patient had a repeat skin biopsy done on March 2 that showed no cancer found in the tissue biopsy sample that was taken.  Patient is very happy to hear these results.  Patient has a surgical wound area that appears to be healing well with some swelling but nonerythematous and mildly tender to palpation.  Patient was told not to remove the bandage by her oncologist until it falls off on its own that she is not taking any showers or baths but is doing sponge baths.    Anxiety-patient reports that she needs a refill of her anxiety medication buspirone to help.    Osteopenia-patient needs to have her Fosamax medication refilled and need to have a new DEXA scan done so I will put in orders in for that.    Chronic back pain-patient has back pain that is chronic but also has a history of mild distal scoliosis and they x-ray that was done 3 years ago so I will be repeating with a scoliosis x-ray to measure the curve in her back.    Procedures     Past History:  Medical History: has a past medical history of Arthritis, Cystocele, unspecified (CODE), Foot pain, left (08/02/2018), Foot pain, right (08/02/2018), Heel pain, Hiatal hernia (07/07/2020), History of Clostridioides difficile colitis (07/07/2020), and Osteoporosis.   Surgical History: has a past surgical history that includes Bladder surgery; Dental surgery; Bladder repair; Cystoscopy (08/2017); Abdominal hysterectomy (08/2017); Salpingoophorectomy (08/2017); ORIF humerus fracture (Left, 9/2/2021); and Shoulder Manipulation (Left, 2/15/2022).   Family History: family history includes Breast cancer in her mother; Cancer in her father, maternal grandfather, maternal grandmother, mother, paternal grandfather, paternal grandmother, and other  "family members; Diabetes in her father, mother, and other family members; Heart disease in her father and mother; Stroke in her mother.   Social History: reports that she has never smoked. She has never used smokeless tobacco. She reports that she does not drink alcohol and does not use drugs.  Immunization History   Administered Date(s) Administered   • FluLaval/Fluzone >6mos 10/15/2021, 11/30/2022   • PPD Test 11/30/2022         Allergies: Patient has no known allergies.     Medications:  Current Outpatient Medications on File Prior to Visit   Medication Sig Dispense Refill   • omeprazole (priLOSEC) 40 MG capsule Take 1 capsule by mouth Daily for 90 days. 90 capsule 1   • oxybutynin (DITROPAN) 5 MG tablet Take 2 tablets by mouth 2 (Two) Times a Day.     • [DISCONTINUED] busPIRone (BUSPAR) 5 MG tablet Take 1 tablet by mouth 3 (Three) Times a Day for 90 days. 270 tablet 3     No current facility-administered medications on file prior to visit.        Health Maintenance Due   Topic Date Due   • TDAP/TD VACCINES (1 - Tdap) Never done   • ZOSTER VACCINE (1 of 2) Never done   • ANNUAL PHYSICAL  Never done   • DXA SCAN  06/10/2022       Vital Signs:   Vitals:    03/16/23 1622   BP: 110/68   BP Location: Right arm   Patient Position: Sitting   Cuff Size: Adult   Pulse: 80   Resp: 14   Temp: 97.9 °F (36.6 °C)   SpO2: 98%   Weight: 67.1 kg (148 lb)   Height: 175.3 cm (69\")      Body mass index is 21.86 kg/m².     ROS:  Review of Systems   Constitutional: Negative for fatigue and fever.   HENT: Negative for congestion, ear pain and sinus pressure.    Respiratory: Negative for cough, chest tightness and shortness of breath.    Cardiovascular: Negative for chest pain, palpitations and leg swelling.   Gastrointestinal: Negative for abdominal pain and diarrhea.   Genitourinary: Negative for dysuria and frequency.   Neurological: Negative for speech difficulty, headache and confusion.   Psychiatric/Behavioral: Negative for " agitation and behavioral problems.          Physical Exam  Vitals reviewed.   Constitutional:       Appearance: Normal appearance.   HENT:      Right Ear: Tympanic membrane normal.      Left Ear: Tympanic membrane normal.      Nose: Nose normal.   Eyes:      Extraocular Movements: Extraocular movements intact.      Conjunctiva/sclera: Conjunctivae normal.      Pupils: Pupils are equal, round, and reactive to light.   Cardiovascular:      Rate and Rhythm: Normal rate and regular rhythm.   Pulmonary:      Effort: Pulmonary effort is normal.      Breath sounds: Normal breath sounds.   Abdominal:      General: Bowel sounds are normal.   Musculoskeletal:         General: Normal range of motion.      Cervical back: Normal range of motion.   Skin:     General: Skin is warm and dry.   Neurological:      General: No focal deficit present.      Mental Status: She is alert and oriented to person, place, and time.   Psychiatric:         Mood and Affect: Mood normal.         Behavior: Behavior normal.          Result Review   Comprehensive Metabolic Panel (10/15/2021 08:41)       Diagnoses and all orders for this visit:    1. Breast cancer screening by mammogram (Primary)  -     Mammo Screening Digital Tomosynthesis Bilateral With CAD; Future    2. Osteopenia of lumbar spine  -     Dexa Bone Density, Axial (Every 2 Years); Future  -     alendronate (Fosamax) 70 MG tablet; Take 1 tablet by mouth Every 7 (Seven) Days for 90 days.  Dispense: 12 tablet; Refill: 0    3. Chronic midline low back pain without sciatica  -     Comprehensive Metabolic Panel  -     Vitamin D,25-Hydroxy  -     XR Scoliosis Complete Including Supine & Erect; Future    4. Scoliosis of thoracolumbar spine, unspecified scoliosis type    5. Abnormal CBC  -     CBC Auto Differential    6. Malignant melanoma of back (HCC)    7. Dextroscoliosis  -     XR Scoliosis Complete Including Supine & Erect; Future    8. Anxiety  -     busPIRone (BUSPAR) 5 MG tablet; Take 1  tablet by mouth 3 (Three) Times a Day for 90 days.  Dispense: 270 tablet; Refill: 3          Smoking Cessation:    Shahida Arroyo  reports that she has never smoked. She has never used smokeless tobacco.          Follow Up   Return in about 3 months (around 6/16/2023).  Patient was given instructions and counseling regarding her condition or for health maintenance advice. Please see specific information pulled into the AVS if appropriate.       Majran Gonzalez MD

## 2023-04-24 ENCOUNTER — HOSPITAL ENCOUNTER (OUTPATIENT)
Dept: MAMMOGRAPHY | Facility: HOSPITAL | Age: 63
Discharge: HOME | End: 2023-04-24
Payer: COMMERCIAL

## 2023-04-24 ENCOUNTER — HOSPITAL ENCOUNTER (OUTPATIENT)
Dept: BONE DENSITY | Facility: HOSPITAL | Age: 63
Discharge: HOME | End: 2023-04-24
Payer: COMMERCIAL

## 2023-04-24 DIAGNOSIS — Z12.31 BREAST CANCER SCREENING BY MAMMOGRAM: ICD-10-CM

## 2023-04-24 DIAGNOSIS — M85.88 OSTEOPENIA OF LUMBAR SPINE: ICD-10-CM

## 2023-04-24 PROCEDURE — 77080 DXA BONE DENSITY AXIAL: CPT

## 2023-04-24 PROCEDURE — 77063 BREAST TOMOSYNTHESIS BI: CPT

## 2023-04-24 PROCEDURE — 77067 SCR MAMMO BI INCL CAD: CPT

## 2023-05-01 ENCOUNTER — TELEPHONE (OUTPATIENT)
Dept: FAMILY MEDICINE CLINIC | Facility: CLINIC | Age: 63
End: 2023-05-01

## 2023-05-01 NOTE — TELEPHONE ENCOUNTER
"  Caller: Shahida Arroyo \"Shahida arroyo\"    Relationship: Self    Best call back number: [phone]    Caller requesting test results: Yes     What test was performed: Bone Marylu, and Mammogram     When was the test performed: 04/29/2023    Where was the test performed: [facility]      Additional notes: Patient calling requesting results.           "

## 2023-05-01 NOTE — TELEPHONE ENCOUNTER
Called patient today and gave patient results of her mammogram and Dexa scan.  Patient verbally stated understanding.

## 2023-05-19 ENCOUNTER — TELEPHONE (OUTPATIENT)
Dept: FAMILY MEDICINE CLINIC | Facility: CLINIC | Age: 63
End: 2023-05-19

## 2023-05-19 NOTE — TELEPHONE ENCOUNTER
"Caller: Shahida Arroyo \"Shahida arroyo\"    Relationship: Self    Best call back number: [phone]    What form or medical record are you requesting: TB RESULTS/PAPERWORK    Who is requesting this form or medical record from you: PATIENT    How would you like to receive the form or medical records (pick-up, mail, fax): PICKUP    Timeframe paperwork needed: NEXT WEEK              "

## 2023-05-30 NOTE — TELEPHONE ENCOUNTER
"Caller: Shahida Arroyo \"Shahida arroyo\"    Relationship to patient: Self    Best call back number:    [phone]          Patient is needing: PATIENT WOULD LIKE TO COME AND  THESE RESULTS TODAY. HUB WAS UNABLE TO WARM TRANSFER.        "

## 2023-06-03 NOTE — PROGRESS NOTES
[facility] Medicine Services  INPATIENT PROGRESS NOTE      Length of Stay: 17  Date of Admission: 5/17/2023  Primary Care Physician: Dolly Foss APRN    Subjective   Chief Complaint: Nausea with eating  HPI:    62yo female pmh significant htn/hyperlipidemia/dm2/hypothyroid/cad/HFpEF/obesity presents ED via EMS c/o 1d hx fever/chills/generalized weakness.  ROS (+) nonproductive cough.  Denies sore throat/rhinorrhea/chest pain/soa/abd pain/n/v/d/dysuria.    Daily assessment 6/2/2023  Remains stable on exam.  Denies any new problems or complaints.  She does state that she is feeling better daily.  She does remain tired fatigue.  Remains weak.  No fevers or chills.  No nausea or vomiting.  Vital signs are stable patient is afebrile.  Is agreeable to go to ARU versus skilled nursing facility on discharge    Review of Systems   Constitutional:  Positive for fatigue.   Respiratory:  Negative for shortness of breath.    Cardiovascular:  Negative for chest pain.   Neurological:  Positive for weakness.      All pertinent negatives and positives are as above. All other systems have been reviewed and are negative unless otherwise stated.     Objective    As of today 06/03/23  Temp:  [96 °F (35.6 °C)-97.8 °F (36.6 °C)] 97.8 °F (36.6 °C)  Heart Rate:  [77-84] 84  Resp:  [16-18] 16  BP: (110-134)/(54-75) 120/54    Physical Exam  Vitals and nursing note reviewed.   Constitutional:       Appearance: She is well-developed.   HENT:      Head: Normocephalic and atraumatic.      Right Ear: External ear normal.      Left Ear: External ear normal.      Mouth/Throat:      Pharynx: Oropharynx is clear.   Eyes:      Extraocular Movements: Extraocular movements intact.      Conjunctiva/sclera: Conjunctivae normal.      Pupils: Pupils are equal, round, and reactive to light.   Cardiovascular:      Rate and Rhythm: Normal rate and regular rhythm.      Heart sounds: Normal heart sounds. No    This encounter was created in error - please disregard.   murmur heard.    No friction rub. No gallop.   Pulmonary:      Effort: Pulmonary effort is normal. No respiratory distress.      Breath sounds: Normal breath sounds. No wheezing or rales.   Chest:      Chest wall: No tenderness.   Abdominal:      General: Bowel sounds are normal. There is no distension.      Palpations: Abdomen is soft.      Tenderness: There is no abdominal tenderness. There is no guarding.   Musculoskeletal:      Cervical back: Normal range of motion and neck supple.      Comments: Left BKA   Skin:     General: Skin is warm and dry.      Comments: Right lower extremity erythema improving.  Blister on dorsum of right foot.  Ruptured blister on medial aspect of right distal lower extremity.   Neurological:      Motor: Weakness present.   Psychiatric:         Behavior: Behavior normal.         Thought Content: Thought content normal.         amLODIPine, 5 mg, Oral, Daily  ARIPiprazole, 5 mg, Oral, Daily  aspirin EC, 325 mg, Oral, Daily  atorvastatin, 80 mg, Oral, Daily  Bag Balm, 1 application, Topical, BID  clopidogrel, 75 mg, Oral, Daily  cyanocobalamin, 1,000 mcg, Intramuscular, Q28 Days  folic acid, 1,000 mcg, Oral, Daily  furosemide, 40 mg, Oral, Daily  gabapentin, 200 mg, Oral, Q12H  heparin (porcine), 5,000 Units, Subcutaneous, Q8H  hydroCHLOROthiazide, 12.5 mg, Oral, Daily  Insulin Aspart, 0-14 Units, Subcutaneous, TID AC  insulin detemir, 30 Units, Subcutaneous, Daily  insulin detemir, 45 Units, Subcutaneous, Nightly  isosorbide mononitrate, 30 mg, Oral, Daily  levothyroxine, 50 mcg, Oral, QAM  pantoprazole, 40 mg, Oral, Daily  ranolazine, 500 mg, Oral, Q12H  senna-docusate sodium, 2 tablet, Oral, BID  sodium chloride, 10 mL, Intravenous, Q12H  sodium chloride, 10 mL, Intravenous, Q12H  sucralfate, 1 g, Oral, 4x Daily  tamsulosin, 0.4 mg, Oral, Daily  venlafaxine XR, 75 mg, Oral, Daily With Breakfast         Results Review:  I have reviewed the labs, radiology results, and diagnostic  studies.    Laboratory Data:   Results from last 7 days   Lab Units 05/28/23  0654   SODIUM mmol/L 136   POTASSIUM mmol/L 4.0   CHLORIDE mmol/L 98   CO2 mmol/L 28.0   BUN mg/dL 12   CREATININE mg/dL 0.92   GLUCOSE mg/dL 295*   CALCIUM mg/dL 9.3   ANION GAP mmol/L 10.0       Estimated Creatinine Clearance: 87.5 mL/min (by C-G formula based on SCr of 0.92 mg/dL).          Results from last 7 days   Lab Units 06/03/23  0623 06/02/23  0533 06/01/23  0603 05/31/23  0538 05/30/23  0639   WBC 10*3/mm3 9.13 9.62 9.82 9.31 11.11*   HEMOGLOBIN g/dL 11.8* 11.8* 12.3 11.8* 12.1   HEMATOCRIT % 36.8 36.7 38.0 34.7 37.4   PLATELETS 10*3/mm3 423 422 460* 416 520*             Culture Data:   No results found for: BLOODCX  No results found for: URINECX  No results found for: RESPCX  No results found for: WOUNDCX  No results found for: STOOLCX  No components found for: BODYFLD    Radiology Data:   Imaging Results (Last 24 Hours)       ** No results found for the last 24 hours. **            I have reviewed the patient's current medications.     Assessment/Plan     Principal Problem:    Sepsis without acute organ dysfunction, due to unspecified organism  Active Problems:    Urinary retention      1.  Right lower extremity cellulitis:   Improving nicely.    Completed antibiotic course.    Wound care following.  Continue current treatment.  Remained stable no new issues or concerns    2.  Urinary retention: Appreciate urology help.  Urinary retention is resolved     3.  Hypertension:   Current blood pressure is 109/79  O2 saturation 92% room air on   Continue amlodipine, Lasix, hydrochlorothiazide    4.  Coronary artery disease:   Stable continue atorvastatin, Plavix, Imdur, Ranexa    5.  Diabetes mellitus:  Continue Levemir, sliding scale and ADA diet    Monitor.      6.  Deconditioning:   Continue PT/OT.  Patient is doing some better, but still feels that she needs rehab prior to home.     DVT prophylaxis: Heparin.  CODE STATUS full  code    Remains essentially unchanged.  We will continue to await case management placement to ARU or skilled nursing facility.    Medical Decision Making  Number and Complexity of problems: Several highly complex medical problems     Conditions and Status:        Condition is improving.        Discussed with: Patient and      Treatment Plan  As above     Care Planning  Shared decision making: Patient in agreement with plan of care  Code status and discussions: Full code     Disposition  Social Determinants of Health that impact treatment or disposition: None  I expect the patient to be discharged to home in 1-2 days.         The patient was evaluated during the global COVID-19 pandemic, and the diagnosis was suspected/considered upon their initial presentation.  Evaluation, treatment, and testing were consistent with current guidelines for patients who present with complaints or symptoms that may be related to COVID-19.     I confirmed that the patient's Advance Care Plan is present, code status is documented, or surrogate decision maker is listed in the patient's medical record.      Reji Mendoza, DO

## 2023-09-27 ENCOUNTER — OFFICE VISIT (OUTPATIENT)
Dept: FAMILY MEDICINE CLINIC | Facility: CLINIC | Age: 63
End: 2023-09-27
Payer: COMMERCIAL

## 2023-09-27 VITALS
DIASTOLIC BLOOD PRESSURE: 70 MMHG | HEART RATE: 62 BPM | OXYGEN SATURATION: 98 % | HEIGHT: 69 IN | SYSTOLIC BLOOD PRESSURE: 102 MMHG | BODY MASS INDEX: 21.92 KG/M2 | TEMPERATURE: 97.2 F | WEIGHT: 148 LBS

## 2023-09-27 DIAGNOSIS — C43.59 MALIGNANT MELANOMA OF BACK: ICD-10-CM

## 2023-09-27 DIAGNOSIS — G89.29 CHRONIC MIDLINE LOW BACK PAIN WITHOUT SCIATICA: Primary | ICD-10-CM

## 2023-09-27 DIAGNOSIS — M54.50 CHRONIC MIDLINE LOW BACK PAIN WITHOUT SCIATICA: Primary | ICD-10-CM

## 2023-09-27 PROCEDURE — 1159F MED LIST DOCD IN RCRD: CPT | Performed by: NURSE PRACTITIONER

## 2023-09-27 PROCEDURE — 1160F RVW MEDS BY RX/DR IN RCRD: CPT | Performed by: NURSE PRACTITIONER

## 2023-09-27 PROCEDURE — 99214 OFFICE O/P EST MOD 30 MIN: CPT | Performed by: NURSE PRACTITIONER

## 2023-09-27 NOTE — PROGRESS NOTES
"Chief Complaint  Hip Pain and Neck Pain    Subjective      Shahida Arroyo is a 63 year old female that presents to [facility] with c/o bilateral hip pain that shoots up into the back. This has been going on for 1.5-2 weeks. She denies any injuries or trauma. She denies numbness, tingling or radiation down the legs. No loss of bowel or bladder.  She has not been doing anything for the pain, states she doesn't like to take medications. She does have a history of chronic back pain.     She is requesting referral to a new dermatologist for malignant melanoma. She has previously had surgery and just goes for follow ups every 3 months to [place]. She is requesting a local provider.      History of Present Illness    Current Outpatient Medications   Medication Instructions    busPIRone (BUSPAR) 5 mg, Oral, 3 Times Daily    Diclofenac Sodium (VOLTAREN) 4 g, Topical, 4 Times Daily PRN    oxybutynin (DITROPAN) 10 mg, Oral, 2 Times Daily       The following portions of the patient's history were reviewed and updated as appropriate: allergies, current medications, past family history, past medical history, past social history, past surgical history, and problem list.    Objective   Vital Signs:   /70   Pulse 62   Temp 97.2 °F (36.2 °C)   Ht 175.3 cm (69\")   Wt 67.1 kg (148 lb)   SpO2 98%   BMI 21.86 kg/m²     Wt Readings from Last 3 Encounters:   09/27/23 67.1 kg (148 lb)   03/16/23 67.1 kg (148 lb)   02/08/23 65.8 kg (145 lb)     BP Readings from Last 3 Encounters:   09/27/23 102/70   03/16/23 110/68   02/08/23 120/70     Physical Exam  Vitals reviewed.   Constitutional:       Appearance: Normal appearance. She is well-developed. She is not ill-appearing.   HENT:      Head: Normocephalic and atraumatic.   Eyes:      Conjunctiva/sclera: Conjunctivae normal.      Pupils: Pupils are equal, round, and reactive to light.   Neck:      Thyroid: No thyromegaly or thyroid tenderness. "   Pulmonary:      Effort: Pulmonary effort is normal.   Skin:     General: Skin is warm and dry.   Neurological:      Mental Status: She is alert and oriented to person, place, and time.   Psychiatric:         Mood and Affect: Mood and affect normal.         Behavior: Behavior normal.        Result Review :  The following data was reviewed by: GABE Nichols on 09/27/2023:            Lab Results (last 72 hours)       ** No results found for the last 72 hours. **             No Images in the past 120 days found..    Lab Results   Component Value Date    SARSANTIGEN Not Detected 11/16/2022    COVID19 Not Detected 07/01/2022    RAPFLUA Negative 07/01/2022    RAPFLUB Negative 07/01/2022    FLUAAG Not Detected 11/16/2022    FLUBAG Not Detected 11/16/2022    RAPSCRN Negative 11/16/2022    INR 1.00 (L) 08/27/2021    BILIRUBINUR Negative 09/22/2022    POCGLU 99 02/28/2023       Procedures        Assessment and Plan   Diagnoses and all orders for this visit:    1. Chronic midline low back pain without sciatica (Primary)  -     Diclofenac Sodium (VOLTAREN) 1 % gel gel; Apply 4 g topically to the appropriate area as directed 4 (Four) Times a Day As Needed (pain).  Dispense: 150 g; Refill: 0    2. Malignant melanoma of back  -     Ambulatory Referral to Dermatology      Advised to complete scoliosis xray that was previously ordered in march by Dr. Gonzalez.     BMI is within normal parameters. No other follow-up for BMI required.         There are no discontinued medications.       Follow Up   No follow-ups on file.  Patient was given instructions and counseling regarding her condition or for health maintenance advice. Please see specific information pulled into the AVS if appropriate.       GABE Nichols  09/28/23  10:17 EDT

## 2023-09-28 ENCOUNTER — HOSPITAL ENCOUNTER (OUTPATIENT)
Dept: GENERAL RADIOLOGY | Facility: HOSPITAL | Age: 63
Discharge: HOME | End: 2023-09-28
Admitting: FAMILY MEDICINE
Payer: COMMERCIAL

## 2023-09-28 DIAGNOSIS — M54.50 CHRONIC MIDLINE LOW BACK PAIN WITHOUT SCIATICA: ICD-10-CM

## 2023-09-28 DIAGNOSIS — M41.80 DEXTROSCOLIOSIS: ICD-10-CM

## 2023-09-28 DIAGNOSIS — G89.29 CHRONIC MIDLINE LOW BACK PAIN WITHOUT SCIATICA: ICD-10-CM

## 2023-09-28 PROCEDURE — 72081 X-RAY EXAM ENTIRE SPI 1 VW: CPT

## 2023-09-30 NOTE — PROGRESS NOTES
Please call and inform patient that she does have a significant curve in her back which is scoliosis with an angle of 21 degrees curving to the left and 18 degrees curving to the right.  The first thing to try would be physical therapy to see if improving her posture will decrease her back pain.  If that does not work then we need to see [facility]

## 2023-10-02 ENCOUNTER — TELEPHONE (OUTPATIENT)
Dept: FAMILY MEDICINE CLINIC | Facility: CLINIC | Age: 63
End: 2023-10-02
Payer: COMMERCIAL

## 2023-10-02 NOTE — TELEPHONE ENCOUNTER
"Caller: Shahida Arroyo \"Shahida arroyo\"    Relationship: Self    Best call back number: [phone]    Caller requesting test results: SELF     What test was performed: XRAY ON BACK     When was the test performed: 09/28/2023    Where was the test performed: [facility]     Additional notes: PATIENT IS REQUESTING CALL WITH RESULTS AS SOON AS POSSIBLE    "

## 2023-10-03 DIAGNOSIS — M41.25 OTHER IDIOPATHIC SCOLIOSIS, THORACOLUMBAR REGION: ICD-10-CM

## 2023-10-03 DIAGNOSIS — M54.9 CHRONIC BACK PAIN, UNSPECIFIED BACK LOCATION, UNSPECIFIED BACK PAIN LATERALITY: Primary | ICD-10-CM

## 2023-10-03 DIAGNOSIS — G89.29 CHRONIC BACK PAIN, UNSPECIFIED BACK LOCATION, UNSPECIFIED BACK PAIN LATERALITY: Primary | ICD-10-CM

## 2023-11-15 ENCOUNTER — OFFICE VISIT (OUTPATIENT)
Dept: FAMILY MEDICINE CLINIC | Facility: CLINIC | Age: 63
End: 2023-11-15
Payer: COMMERCIAL

## 2023-11-15 VITALS
TEMPERATURE: 97.3 F | HEIGHT: 69 IN | DIASTOLIC BLOOD PRESSURE: 70 MMHG | SYSTOLIC BLOOD PRESSURE: 110 MMHG | HEART RATE: 82 BPM | OXYGEN SATURATION: 98 % | WEIGHT: 153 LBS | BODY MASS INDEX: 22.66 KG/M2

## 2023-11-15 DIAGNOSIS — J32.9 OTHER SINUSITIS, UNSPECIFIED CHRONICITY: Primary | ICD-10-CM

## 2023-11-15 RX ORDER — AMOXICILLIN AND CLAVULANATE POTASSIUM 875; 125 MG/1; MG/1
1 TABLET, FILM COATED ORAL 2 TIMES DAILY
Qty: 20 TABLET | Refills: 0 | Status: SHIPPED | OUTPATIENT
Start: 2023-11-15 | End: 2023-11-25

## 2023-11-15 NOTE — PROGRESS NOTES
"Chief Complaint  Eye Drainage (Watering ), Sore Throat, and Headache    Subjective      History of Present Illness  Shahida Arroyo is a 63 y.o. female who presents to [facility] with a past medical history of recent URI tested negative for COVID and flu presents for worsening nasal discharge and head congestion.  She has not been treated with antibiotic in the last week she denies any fever body aches.  Past Medical History:   Diagnosis Date    Arthritis     Cystocele, unspecified (CODE)     Foot pain, left 08/02/2018    Foot pain, right 08/02/2018    Heel pain     Hiatal hernia 07/07/2020    History of Clostridioides difficile colitis 07/07/2020    Osteoporosis          Objective   Vital Signs:   Vitals:    11/15/23 1335   BP: 110/70   Pulse: 82   Temp: 97.3 °F (36.3 °C)   SpO2: 98%   Weight: 69.4 kg (153 lb)   Height: 175.3 cm (69\")     Body mass index is 22.59 kg/m².    Wt Readings from Last 3 Encounters:   11/15/23 69.4 kg (153 lb)   09/27/23 67.1 kg (148 lb)   03/16/23 67.1 kg (148 lb)     BP Readings from Last 3 Encounters:   11/15/23 110/70   09/27/23 102/70   03/16/23 110/68       Health Maintenance   Topic Date Due    COVID-19 Vaccine (1) Never done    TDAP/TD VACCINES (1 - Tdap) Never done    ZOSTER VACCINE (1 of 2) Never done    ANNUAL PHYSICAL  Never done    INFLUENZA VACCINE  08/01/2023    MAMMOGRAM  04/24/2025    DXA SCAN  04/24/2025    COLORECTAL CANCER SCREENING  05/01/2030    HEPATITIS C SCREENING  Completed    Pneumococcal Vaccine 0-64  Aged Out       Physical Exam  HENT:      Head: Normocephalic.      Right Ear: Tympanic membrane normal.      Left Ear: Tympanic membrane normal.      Nose: Nose normal. Congestion and rhinorrhea present.      Right Sinus: Frontal sinus tenderness present.      Left Sinus: Frontal sinus tenderness present.      Mouth/Throat:      Mouth: Mucous membranes are moist.      Pharynx: Oropharynx is clear. Posterior oropharyngeal erythema " present. No oropharyngeal exudate.   Eyes:      Extraocular Movements: Extraocular movements intact.      Conjunctiva/sclera: Conjunctivae normal.      Pupils: Pupils are equal, round, and reactive to light.   Cardiovascular:      Rate and Rhythm: Normal rate and regular rhythm.      Heart sounds: No murmur heard.  Pulmonary:      Effort: Pulmonary effort is normal.   Abdominal:      General: Abdomen is flat.   Musculoskeletal:      Cervical back: Normal range of motion.   Lymphadenopathy:      Cervical: No cervical adenopathy.   Neurological:      Mental Status: She is alert.   Psychiatric:         Mood and Affect: Mood normal.            Result Review :  The following data was reviewed by: Jana Miramontes MD on 11/15/2023:      Procedures        Assessment and Plan   Diagnoses and all orders for this visit:    1. Other sinusitis, unspecified chronicity (Primary)  -     amoxicillin-clavulanate (AUGMENTIN) 875-125 MG per tablet; Take 1 tablet by mouth 2 (Two) Times a Day for 10 days.  Dispense: 20 tablet; Refill: 0        BMI is within normal parameters. No other follow-up for BMI required.         FOLLOW UP  No follow-ups on file.  Patient was given instructions and counseling regarding her condition or for health maintenance advice. Please see specific information pulled into the AVS if appropriate.       Jana Miramontes MD  11/15/23  14:40 EST    CURRENT & DISCONTINUED MEDICATIONS  Current Outpatient Medications   Medication Instructions    amoxicillin-clavulanate (AUGMENTIN) 875-125 MG per tablet 1 tablet, Oral, 2 Times Daily    busPIRone (BUSPAR) 5 mg, Oral, 3 Times Daily    Diclofenac Sodium (VOLTAREN) 4 g, Topical, 4 Times Daily PRN    oxybutynin (DITROPAN) 10 mg, Oral, 2 Times Daily       There are no discontinued medications.

## 2023-12-29 ENCOUNTER — OFFICE VISIT (OUTPATIENT)
Dept: FAMILY MEDICINE CLINIC | Facility: CLINIC | Age: 63
End: 2023-12-29
Payer: COMMERCIAL

## 2023-12-29 VITALS
OXYGEN SATURATION: 95 % | HEIGHT: 69 IN | WEIGHT: 155.6 LBS | BODY MASS INDEX: 23.05 KG/M2 | HEART RATE: 78 BPM | TEMPERATURE: 97.1 F | SYSTOLIC BLOOD PRESSURE: 100 MMHG | DIASTOLIC BLOOD PRESSURE: 58 MMHG

## 2023-12-29 DIAGNOSIS — C43.59 MALIGNANT MELANOMA OF BACK: Primary | ICD-10-CM

## 2023-12-29 DIAGNOSIS — M79.671 RIGHT FOOT PAIN: ICD-10-CM

## 2023-12-29 PROCEDURE — 1159F MED LIST DOCD IN RCRD: CPT | Performed by: NURSE PRACTITIONER

## 2023-12-29 PROCEDURE — 99214 OFFICE O/P EST MOD 30 MIN: CPT | Performed by: NURSE PRACTITIONER

## 2023-12-29 PROCEDURE — 1160F RVW MEDS BY RX/DR IN RCRD: CPT | Performed by: NURSE PRACTITIONER

## 2023-12-29 NOTE — PROGRESS NOTES
Chief Complaint  Melanoma (On her back ) and lump on bottom of foot  (Right foot )    Subjective        Medical History: has a past medical history of Arthritis, Cystocele, unspecified (CODE), Foot pain, left (08/02/2018), Foot pain, right (08/02/2018), Heel pain, Hiatal hernia (07/07/2020), History of Clostridioides difficile colitis (07/07/2020), and Osteoporosis.     Surgical History: has a past surgical history that includes Bladder surgery; Dental surgery; Bladder repair; Cystoscopy (08/2017); Abdominal hysterectomy (08/2017); Salpingoophorectomy (08/2017); ORIF humerus fracture (Left, 09/02/2021); Shoulder Manipulation (Left, 02/15/2022); and Skin cancer excision.     Family History: family history includes Breast cancer in her mother; Cancer in her father, maternal grandfather, maternal grandmother, mother, paternal grandfather, paternal grandmother, and other family members; Diabetes in her father, mother, and other family members; Heart disease in her father and mother; Stroke in her mother.     Social History: reports that she has never smoked. She has never used smokeless tobacco. She reports that she does not drink alcohol and does not use drugs.    Shahida Arroyo presents to [facility]  History of Present Illness  Melanoma  Patient is a 63-year-old female, she is a patient of Dr. Gonzalez is comes in today needing a dermatology referral.  Patient has a history of melanoma on her back, she seen dermatology initially out of [facility] she is needing a new referral, it does appear after reviewing chart the patient's dermatology referral is up-to-date last referral was placed September 2023 by Lanette Arguelles.  On the referral states that they had called patient but unable to get a hold of her, I encourage patient to call dermatology office and see if new referral is needed if not patient can make her own appointment if so encourage patient to call office back and we  "will make sure the referral is reopened and completed.  Foot Pain   Patient is complaining of pain on the bottom of her right foot.  She is complaining of a lump that appeared approximately 3 to 4 weeks ago.  She states it is painful with palpation and walking.  She denies any swelling or injury to her foot.  She states the pain wax and wanes in intensity.  She has tried ibuprofen with mild relief.  Osteoporosis  Patient DEXA scan March 2023 shows osteoporosis, patient was asking me several questions about the osteoporosis including best treatment options.  I encourage patient to follow-up with her primary care provider to discuss her osteoporosis and the best treatment options.  Discussed with patient that she is due for annual physical exam and to make an appointment with her primary care provider on the way out to receive the annual physical exam and discuss patient's further concerns.  Patient verbalized understanding agreeable treatment plan.        Objective   Vital Signs:   /58   Pulse 78   Temp 97.1 °F (36.2 °C)   Ht 175.3 cm (69\")   Wt 70.6 kg (155 lb 9.6 oz)   SpO2 95%   BMI 22.98 kg/m²       Wt Readings from Last 3 Encounters:   12/29/23 70.6 kg (155 lb 9.6 oz)   11/15/23 69.4 kg (153 lb)   09/27/23 67.1 kg (148 lb)        BP Readings from Last 3 Encounters:   12/29/23 100/58   11/15/23 110/70   09/27/23 102/70        BMI is within normal parameters. No other follow-up for BMI required.       Physical Exam  Vitals reviewed.   Constitutional:       General: She is not in acute distress.     Appearance: Normal appearance. She is well-developed. She is not ill-appearing or diaphoretic.   HENT:      Head: Normocephalic and atraumatic.   Eyes:      Conjunctiva/sclera: Conjunctivae normal.      Pupils: Pupils are equal, round, and reactive to light.   Cardiovascular:      Rate and Rhythm: Normal rate and regular rhythm.      Heart sounds: No murmur heard.  Pulmonary:      Effort: Pulmonary effort is " normal.      Breath sounds: Normal breath sounds. No wheezing or rhonchi.   Musculoskeletal:      Right lower leg: No edema.      Left lower leg: No edema.      Comments: Tenderness over the the plantar fascia just proximal to the big toe possible fibrosed band   Skin:     General: Skin is warm and dry.   Neurological:      Mental Status: She is alert and oriented to person, place, and time.   Psychiatric:         Mood and Affect: Mood and affect normal.         Behavior: Behavior normal.         Thought Content: Thought content normal.         Judgment: Judgment normal.        Result Review :  {The following data was reviewed by GABE Lopez on 12/29/2023.  Common labs          3/16/2023    17:12   Common Labs   Glucose 114    BUN 15    Creatinine 0.94    Sodium 143    Potassium 4.2    Chloride 105    Calcium 10.1    Albumin 4.4    Total Bilirubin <0.2    Alkaline Phosphatase 87    AST (SGOT) 21    ALT (SGPT) 16    WBC 6.61    Hemoglobin 13.0    Hematocrit 38.5    Platelets 248                  Current Outpatient Medications on File Prior to Visit   Medication Sig Dispense Refill    busPIRone (BUSPAR) 5 MG tablet Take 1 tablet by mouth 3 (Three) Times a Day for 90 days. 270 tablet 3    Diclofenac Sodium (VOLTAREN) 1 % gel gel Apply 4 g topically to the appropriate area as directed 4 (Four) Times a Day As Needed (pain). (Patient not taking: Reported on 12/29/2023) 150 g 0    oxybutynin (DITROPAN) 5 MG tablet Take 2 tablets by mouth 2 (Two) Times a Day. (Patient not taking: Reported on 12/29/2023)       No current facility-administered medications on file prior to visit.        Assessment and Plan  Diagnoses and all orders for this visit:    1. Malignant melanoma of back (Primary)    2. Right foot pain  -     Ambulatory Referral to Podiatry    Refer patient over to podiatry for further workup and management.  As discussed above patient will call dermatology office, and follow-up with her PCP to discuss  options for osteoporosis.  Discussed to call office with any questions or concerns.  Patient verbalized understanding agreeable treatment plan.        Follow Up   Return for With primary care provider for annual physical exam and discuss osteoporosis.  Patient was given instructions and counseling regarding her condition or for health maintenance advice. Please see specific information pulled into the AVS if appropriate.       Part of this note may be electronic transcription/translation of spoken language to printed text using the Dragon dictation system

## 2024-01-29 ENCOUNTER — OFFICE VISIT (OUTPATIENT)
Dept: PODIATRY | Facility: CLINIC | Age: 64
End: 2024-01-29
Payer: COMMERCIAL

## 2024-01-29 VITALS
OXYGEN SATURATION: 98 % | BODY MASS INDEX: 22.89 KG/M2 | WEIGHT: 155 LBS | HEART RATE: 71 BPM | TEMPERATURE: 98.5 F | SYSTOLIC BLOOD PRESSURE: 116 MMHG | DIASTOLIC BLOOD PRESSURE: 79 MMHG

## 2024-01-29 DIAGNOSIS — M72.2 PLANTAR FIBROMATOSIS: Primary | ICD-10-CM

## 2024-01-29 DIAGNOSIS — B35.1 ONYCHOMYCOSIS: ICD-10-CM

## 2024-01-29 PROCEDURE — 1160F RVW MEDS BY RX/DR IN RCRD: CPT | Performed by: PODIATRIST

## 2024-01-29 PROCEDURE — 1159F MED LIST DOCD IN RCRD: CPT | Performed by: PODIATRIST

## 2024-01-29 PROCEDURE — 99213 OFFICE O/P EST LOW 20 MIN: CPT | Performed by: PODIATRIST

## 2024-01-29 NOTE — PROGRESS NOTES
[facility] - PODIATRY    Today's Date: 01/29/24    Patient Name: Shahida Arroyo  MRN: [id number]  CSN: [id number]  PCP: Marjan Gonzalez MD, Referring Provider: Negro Love, *    SUBJECTIVE     Chief Complaint   Patient presents with    Right Foot - Pain, Establish Care     Referral from Flakita Love for right foot pain, pain for 3 weeks     HPI: Shahida Arroyo, a 63 y.o.female, presents to clinic.    Patient 63-year-old female presenting with a lump on the bottom of her right foot.  Patient states it has been there for a few weeks.  Patient states it is tender at times and she is on her feet because she runs a .  Patient also has discoloration of her toenails.    Past Surgical History:   Procedure Laterality Date    ABDOMINAL HYSTERECTOMY  08/2017    BLADDER REPAIR      ALITITS MIDURETHRAL SLING 8/2/17    BLADDER SURGERY      [facility]    DENTAL PROCEDURE      ORIF HUMERUS FRACTURE Left 09/02/2021    Procedure: HUMERUS OPEN  REDUCTION INTERNAL FIXATION LEFT;  Surgeon: Narciso Duque MD;  Location: [facility] OR;  Service: Orthopedics;  Laterality: Left;    SALPINGO OOPHORECTOMY  08/2017    SHOULDER MANIPULATION Left 02/15/2022    Procedure: LEFT SHOULDER MANIPULATION WITH STEROID INJECTION;  Surgeon: Narciso Duque MD;  Location: [facility] OR [facility];  Service: Orthopedics;  Laterality: Left;    SKIN CANCER EXCISION       Family History   Problem Relation Age of Onset    Stroke Mother     Heart disease Mother     Cancer Mother     Breast cancer Mother     Diabetes Mother     Heart disease Father     Cancer Father     Diabetes Father     Cancer Maternal Grandmother     Cancer Maternal Grandfather     Cancer Paternal Grandmother     Cancer Paternal Grandfather     Cancer Other     Diabetes Other     Cancer Other     Diabetes Other     Malig Hyperthermia Neg Hx      Social History     Socioeconomic History    Marital status:    Tobacco Use    Smoking  status: Never    Smokeless tobacco: Never   Vaping Use    Vaping Use: Never used   Substance and Sexual Activity    Alcohol use: Never     Alcohol/week: 1.0 standard drink of alcohol     Types: 1 Glasses of wine per week    Drug use: Never    Sexual activity: Not Currently     No Known Allergies  Current Outpatient Medications   Medication Sig Dispense Refill    busPIRone (BUSPAR) 5 MG tablet Take 1 tablet by mouth 3 (Three) Times a Day for 90 days. 270 tablet 3    Diclofenac Sodium (VOLTAREN) 1 % gel gel Apply 4 g topically to the appropriate area as directed 4 (Four) Times a Day As Needed (pain). (Patient not taking: Reported on 12/29/2023) 150 g 0    oxybutynin (DITROPAN) 5 MG tablet Take 2 tablets by mouth 2 (Two) Times a Day. (Patient not taking: Reported on 12/29/2023)       No current facility-administered medications for this visit.     Review of Systems   Constitutional: Negative.    Musculoskeletal:         Right heel pain   All other systems reviewed and are negative.      OBJECTIVE     Vitals:    01/29/24 0803   BP: 116/79   Pulse: 71   Temp: 98.5 °F (36.9 °C)   SpO2: 98%       WBC   Date Value Ref Range Status   03/16/2023 6.61 3.40 - 10.80 10*3/mm3 Final     RBC   Date Value Ref Range Status   03/16/2023 4.33 3.77 - 5.28 10*6/mm3 Final     Hemoglobin   Date Value Ref Range Status   03/16/2023 13.0 12.0 - 15.9 g/dL Final     Hematocrit   Date Value Ref Range Status   03/16/2023 38.5 34.0 - 46.6 % Final     MCV   Date Value Ref Range Status   03/16/2023 88.9 79.0 - 97.0 fL Final     MCH   Date Value Ref Range Status   03/16/2023 30.0 26.6 - 33.0 pg Final     MCHC   Date Value Ref Range Status   03/16/2023 33.8 31.5 - 35.7 g/dL Final     RDW   Date Value Ref Range Status   03/16/2023 11.8 (L) 12.3 - 15.4 % Final     RDW-SD   Date Value Ref Range Status   03/16/2023 38.6 37.0 - 54.0 fl Final     MPV   Date Value Ref Range Status   03/16/2023 11.3 6.0 - 12.0 fL Final     Platelets   Date Value Ref Range  Status   03/16/2023 248 140 - 450 10*3/mm3 Final     Neutrophil %   Date Value Ref Range Status   03/16/2023 52.8 42.7 - 76.0 % Final     Lymphocyte %   Date Value Ref Range Status   03/16/2023 32.8 19.6 - 45.3 % Final     Monocyte %   Date Value Ref Range Status   03/16/2023 11.6 5.0 - 12.0 % Final     Eosinophil %   Date Value Ref Range Status   03/16/2023 1.8 0.3 - 6.2 % Final     Basophil %   Date Value Ref Range Status   03/16/2023 0.8 0.0 - 1.5 % Final     Immature Grans %   Date Value Ref Range Status   03/16/2023 0.2 0.0 - 0.5 % Final     Neutrophils, Absolute   Date Value Ref Range Status   03/16/2023 3.49 1.70 - 7.00 10*3/mm3 Final     Lymphocytes, Absolute   Date Value Ref Range Status   03/16/2023 2.17 0.70 - 3.10 10*3/mm3 Final     Monocytes, Absolute   Date Value Ref Range Status   03/16/2023 0.77 0.10 - 0.90 10*3/mm3 Final     Eosinophils, Absolute   Date Value Ref Range Status   03/16/2023 0.12 0.00 - 0.40 10*3/mm3 Final     Basophils, Absolute   Date Value Ref Range Status   03/16/2023 0.05 0.00 - 0.20 10*3/mm3 Final     Immature Grans, Absolute   Date Value Ref Range Status   03/16/2023 0.01 0.00 - 0.05 10*3/mm3 Final     nRBC   Date Value Ref Range Status   03/16/2023 0.0 0.0 - 0.2 /100 WBC Final         Lab Results   Component Value Date    GLUCOSE 114 (H) 03/16/2023    BUN 15 03/16/2023    CREATININE 0.94 03/16/2023    EGFRIFNONA 82 10/15/2021    BCR 16.0 03/16/2023    K 4.2 03/16/2023    CO2 28.0 03/16/2023    CALCIUM 10.1 03/16/2023    ALBUMIN 4.4 03/16/2023    LABIL2 1.7 07/31/2020    AST 21 03/16/2023    ALT 16 03/16/2023       Patient seen in no apparent distress.      PHYSICAL EXAM:     Foot/Ankle Exam    GENERAL  Appearance:  appears stated age  Orientation:  AAOx3  Affect:  appropriate  Gait:  unimpaired  Assistance:  independent  Right shoe gear: casual shoe  Left shoe gear: casual shoe    VASCULAR     Right Foot Vascularity   Normal vascular exam    Dorsalis pedis:  2+  Posterior  tibial:  2+  Skin temperature:  warm  Edema grading:  None  CFT:  < 3 seconds  Pedal hair growth:  Present  Varicosities:  none     Left Foot Vascularity   Normal vascular exam    Dorsalis pedis:  2+  Posterior tibial:  2+  Skin temperature:  warm  Edema grading:  None  CFT:  < 3 seconds  Pedal hair growth:  Present  Varicosities:  none     NEUROLOGIC     Right Foot Neurologic   Normal sensation    Light touch sensation: normal  Vibratory sensation: normal  Hot/Cold sensation: normal  Protective Sensation using [place] Monofilament:   Sites intact: 10  Sites tested: 10     Left Foot Neurologic   Normal sensation    Light touch sensation: normal  Vibratory sensation: normal  Hot/Cold sensation:  normal  Protective Sensation using [place]ary Monofilament:   Sites intact: 10  Sites tested: 10    MUSCLE STRENGTH     Right Foot Muscle Strength   Foot dorsiflexion:  4  Foot plantar flexion:  4  Foot inversion:  4  Foot eversion:  4     Left Foot Muscle Strength   Foot dorsiflexion:  4  Foot plantar flexion:  4  Foot inversion:  4  Foot eversion:  4    RANGE OF MOTION     Right Foot Range of Motion   Foot and ankle ROM within normal limits       Left Foot Range of Motion   Foot and ankle ROM within normal limits      DERMATOLOGIC      Right Foot Dermatologic   Skin  Right foot skin is intact.      Left Foot Dermatologic   Skin  Left foot skin is intact.      Right foot additional comments: Plantar fibroma to plantar aspect of instep.  Mild pain on palpation      RADIOLOGY:      No results found.    ASSESSMENT/PLAN     Diagnoses and all orders for this visit:    1. Plantar fibromatosis (Primary)    2. Onychomycosis      Discussed steroid injection for plantar fibroma if needed.    She would like to hold off at this time.    Discussed discoloration of her nails is likely secondary to trauma.  Will monitor. Patient follows up with her dermatologist in 1 week.    Comprehensive lower extremity examination and  evaluation was performed.    Discussed findings and treatment plan including risks, benefits, and treatment options with patient in detail. Patient agreed with treatment plan.    Medications and allergies reviewed.  Reviewed available lab values along with other pertinent labs.  These were discussed with the patient.    An After Visit Summary was printed and given to the patient at discharge, including (if requested) any available informative/educational handouts regarding diagnosis, treatment, or medications. All questions were answered to patient/family satisfaction. Should symptoms fail to improve or worsen they agree to call or return to clinic or to go to the Emergency Department. Discussed the importance of following up with any needed screening tests/labs/specialist appointments and any requested follow-up recommended by me today. Importance of maintaining follow-up discussed and patient accepts that missed appointments can delay diagnosis and potentially lead to worsening of conditions.    Return in about 6 months (around 7/29/2024), or if symptoms worsen or fail to improve., or sooner if acute issues arise.    This document has been electronically signed by Yuan Reyes DPM on January 29, 2024 09:02 EST

## 2024-01-30 ENCOUNTER — PATIENT ROUNDING (BHMG ONLY) (OUTPATIENT)
Dept: PODIATRY | Facility: CLINIC | Age: 64
End: 2024-01-30
Payer: COMMERCIAL

## 2024-01-30 NOTE — PROGRESS NOTES
A [employer] message has been sent to the patient for PATIENT ROUNDING with [facility] Podiatry

## 2024-02-06 ENCOUNTER — OFFICE VISIT (OUTPATIENT)
Dept: FAMILY MEDICINE CLINIC | Facility: CLINIC | Age: 64
End: 2024-02-06
Payer: COMMERCIAL

## 2024-02-06 VITALS
HEIGHT: 69 IN | WEIGHT: 156.6 LBS | HEART RATE: 76 BPM | DIASTOLIC BLOOD PRESSURE: 68 MMHG | SYSTOLIC BLOOD PRESSURE: 106 MMHG | TEMPERATURE: 96.3 F | OXYGEN SATURATION: 97 % | BODY MASS INDEX: 23.19 KG/M2

## 2024-02-06 DIAGNOSIS — N32.81 OVERACTIVE BLADDER: ICD-10-CM

## 2024-02-06 DIAGNOSIS — Z12.31 BREAST CANCER SCREENING BY MAMMOGRAM: ICD-10-CM

## 2024-02-06 DIAGNOSIS — F41.9 ANXIETY: ICD-10-CM

## 2024-02-06 DIAGNOSIS — R73.9 HYPERGLYCEMIA: ICD-10-CM

## 2024-02-06 DIAGNOSIS — M25.50 POLYARTHRALGIA: Primary | ICD-10-CM

## 2024-02-06 DIAGNOSIS — Z00.00 ANNUAL PHYSICAL EXAM: ICD-10-CM

## 2024-02-06 DIAGNOSIS — R79.89 ABNORMAL CBC: ICD-10-CM

## 2024-02-06 DIAGNOSIS — R35.0 INCREASED URINARY FREQUENCY: ICD-10-CM

## 2024-02-06 LAB
25(OH)D3 SERPL-MCNC: 40.6 NG/ML (ref 30–100)
ALBUMIN SERPL-MCNC: 4.9 G/DL (ref 3.5–5.2)
ALBUMIN/GLOB SERPL: 2.1 G/DL
ALP SERPL-CCNC: 86 U/L (ref 39–117)
ALT SERPL W P-5'-P-CCNC: 22 U/L (ref 1–33)
ANION GAP SERPL CALCULATED.3IONS-SCNC: 12 MMOL/L (ref 5–15)
AST SERPL-CCNC: 23 U/L (ref 1–32)
BASOPHILS # BLD AUTO: 0.04 10*3/MM3 (ref 0–0.2)
BASOPHILS NFR BLD AUTO: 0.7 % (ref 0–1.5)
BILIRUB BLD-MCNC: NEGATIVE MG/DL
BILIRUB SERPL-MCNC: 0.2 MG/DL (ref 0–1.2)
BUN SERPL-MCNC: 15 MG/DL (ref 8–23)
BUN/CREAT SERPL: 18.1 (ref 7–25)
CALCIUM SPEC-SCNC: 9.9 MG/DL (ref 8.6–10.5)
CHLORIDE SERPL-SCNC: 104 MMOL/L (ref 98–107)
CHROMATIN AB SERPL-ACNC: 16 IU/ML (ref 0–14)
CLARITY, POC: CLEAR
CO2 SERPL-SCNC: 26 MMOL/L (ref 22–29)
COLOR UR: YELLOW
CREAT SERPL-MCNC: 0.83 MG/DL (ref 0.57–1)
DEPRECATED RDW RBC AUTO: 40.1 FL (ref 37–54)
EGFRCR SERPLBLD CKD-EPI 2021: 80.3 ML/MIN/1.73
EOSINOPHIL # BLD AUTO: 0.13 10*3/MM3 (ref 0–0.4)
EOSINOPHIL NFR BLD AUTO: 2.2 % (ref 0.3–6.2)
ERYTHROCYTE [DISTWIDTH] IN BLOOD BY AUTOMATED COUNT: 12.3 % (ref 12.3–15.4)
GLOBULIN UR ELPH-MCNC: 2.3 GM/DL
GLUCOSE SERPL-MCNC: 83 MG/DL (ref 65–99)
GLUCOSE UR STRIP-MCNC: NEGATIVE MG/DL
HBA1C MFR BLD: 5.3 % (ref 4.8–5.6)
HCT VFR BLD AUTO: 40.7 % (ref 34–46.6)
HGB BLD-MCNC: 13.4 G/DL (ref 12–15.9)
IMM GRANULOCYTES # BLD AUTO: 0.01 10*3/MM3 (ref 0–0.05)
IMM GRANULOCYTES NFR BLD AUTO: 0.2 % (ref 0–0.5)
KETONES UR QL: NEGATIVE
LEUKOCYTE EST, POC: NEGATIVE
LYMPHOCYTES # BLD AUTO: 1.64 10*3/MM3 (ref 0.7–3.1)
LYMPHOCYTES NFR BLD AUTO: 27.4 % (ref 19.6–45.3)
MCH RBC QN AUTO: 29.8 PG (ref 26.6–33)
MCHC RBC AUTO-ENTMCNC: 32.9 G/DL (ref 31.5–35.7)
MCV RBC AUTO: 90.4 FL (ref 79–97)
MONOCYTES # BLD AUTO: 0.42 10*3/MM3 (ref 0.1–0.9)
MONOCYTES NFR BLD AUTO: 7 % (ref 5–12)
NEUTROPHILS NFR BLD AUTO: 3.74 10*3/MM3 (ref 1.7–7)
NEUTROPHILS NFR BLD AUTO: 62.5 % (ref 42.7–76)
NITRITE UR-MCNC: NEGATIVE MG/ML
NRBC BLD AUTO-RTO: 0 /100 WBC (ref 0–0.2)
PH UR: 5.5 [PH] (ref 5–8)
PLATELET # BLD AUTO: 249 10*3/MM3 (ref 140–450)
PMV BLD AUTO: 11.3 FL (ref 6–12)
POTASSIUM SERPL-SCNC: 4 MMOL/L (ref 3.5–5.2)
PROT SERPL-MCNC: 7.2 G/DL (ref 6–8.5)
PROT UR STRIP-MCNC: NEGATIVE MG/DL
RBC # BLD AUTO: 4.5 10*6/MM3 (ref 3.77–5.28)
RBC # UR STRIP: NEGATIVE /UL
SODIUM SERPL-SCNC: 142 MMOL/L (ref 136–145)
SP GR UR: 1.03 (ref 1–1.03)
UROBILINOGEN UR QL: NORMAL
WBC NRBC COR # BLD AUTO: 5.98 10*3/MM3 (ref 3.4–10.8)

## 2024-02-06 PROCEDURE — 80053 COMPREHEN METABOLIC PANEL: CPT | Performed by: FAMILY MEDICINE

## 2024-02-06 PROCEDURE — 86038 ANTINUCLEAR ANTIBODIES: CPT | Performed by: FAMILY MEDICINE

## 2024-02-06 PROCEDURE — 83036 HEMOGLOBIN GLYCOSYLATED A1C: CPT | Performed by: FAMILY MEDICINE

## 2024-02-06 PROCEDURE — 85025 COMPLETE CBC W/AUTO DIFF WBC: CPT | Performed by: FAMILY MEDICINE

## 2024-02-06 PROCEDURE — 82306 VITAMIN D 25 HYDROXY: CPT | Performed by: FAMILY MEDICINE

## 2024-02-06 PROCEDURE — 86431 RHEUMATOID FACTOR QUANT: CPT | Performed by: FAMILY MEDICINE

## 2024-02-06 RX ORDER — BUSPIRONE HYDROCHLORIDE 10 MG/1
10 TABLET ORAL 3 TIMES DAILY
Qty: 270 TABLET | Refills: 3 | Status: SHIPPED | OUTPATIENT
Start: 2024-02-06 | End: 2024-05-06

## 2024-02-07 LAB — ANA SER QL: NEGATIVE

## 2024-02-12 ENCOUNTER — TELEPHONE (OUTPATIENT)
Dept: FAMILY MEDICINE CLINIC | Facility: CLINIC | Age: 64
End: 2024-02-12

## 2024-02-12 NOTE — TELEPHONE ENCOUNTER
Caller:   WILTON JANG     Relationship:  SELF     Best call back number:  [phone]     Who are you requesting to speak with (clinical staff, provider,  specific staff member):  CLINICAL     What was the call regarding:  PATIENT IS REQUESTING RESULTS OF HER LAB WORK PLEASE.

## 2024-02-13 ENCOUNTER — TELEPHONE (OUTPATIENT)
Dept: FAMILY MEDICINE CLINIC | Facility: CLINIC | Age: 64
End: 2024-02-13

## 2024-02-13 NOTE — TELEPHONE ENCOUNTER
Caller: Shahida Arroyo    Relationship to patient: Self    Best call back number: [phone]    Patient is needing: PATIENT IS REQUESTING A CALL BACK ABOUT HER LAB RESULTS. PLEASE CALL AND ADVISE.

## 2024-02-15 ENCOUNTER — PATIENT MESSAGE (OUTPATIENT)
Dept: FAMILY MEDICINE CLINIC | Facility: CLINIC | Age: 64
End: 2024-02-15
Payer: COMMERCIAL

## 2024-03-18 ENCOUNTER — PATIENT MESSAGE (OUTPATIENT)
Dept: FAMILY MEDICINE CLINIC | Facility: CLINIC | Age: 64
End: 2024-03-18
Payer: COMMERCIAL

## 2024-03-18 RX ORDER — ALENDRONATE SODIUM 70 MG/1
70 TABLET ORAL
Qty: 13 TABLET | Refills: 2 | Status: SHIPPED | OUTPATIENT
Start: 2024-03-18

## 2024-04-16 ENCOUNTER — TELEPHONE (OUTPATIENT)
Dept: FAMILY MEDICINE CLINIC | Facility: CLINIC | Age: 64
End: 2024-04-16

## 2024-04-16 NOTE — TELEPHONE ENCOUNTER
HUB STAFF ATTEMPTED TO FOLLOW WARM TRANSFER PROCESS AND WAS UNSUCCESSFUL.    Caller: Shahida Arroyo    Relationship to patient: Self    Best call back number: [phone]     Chief complaint: COUGH, SORE THROAT, DIZZINESS, NAUSEA, EARS FEEL CLOGGED    Type of visit: SAME DAY     Requested date: 4/16/24

## 2024-04-18 NOTE — TELEPHONE ENCOUNTER
HUB TO RELAY    Patient advised to go to urgent care to be evaluated and treated or she can call the office at [phone] and schedule an apt with any provider who has an available opening.

## 2024-04-26 ENCOUNTER — HOSPITAL ENCOUNTER (OUTPATIENT)
Dept: MAMMOGRAPHY | Facility: HOSPITAL | Age: 64
Discharge: HOME | End: 2024-04-26
Admitting: FAMILY MEDICINE
Payer: COMMERCIAL

## 2024-04-26 DIAGNOSIS — Z12.31 BREAST CANCER SCREENING BY MAMMOGRAM: ICD-10-CM

## 2024-04-26 PROCEDURE — 77067 SCR MAMMO BI INCL CAD: CPT

## 2024-04-26 PROCEDURE — 77063 BREAST TOMOSYNTHESIS BI: CPT

## 2024-09-10 ENCOUNTER — OFFICE VISIT (OUTPATIENT)
Dept: FAMILY MEDICINE CLINIC | Facility: CLINIC | Age: 64
End: 2024-09-10
Payer: COMMERCIAL

## 2024-09-10 VITALS
TEMPERATURE: 98.2 F | SYSTOLIC BLOOD PRESSURE: 92 MMHG | HEART RATE: 91 BPM | WEIGHT: 145.3 LBS | OXYGEN SATURATION: 97 % | DIASTOLIC BLOOD PRESSURE: 46 MMHG | BODY MASS INDEX: 21.52 KG/M2 | HEIGHT: 69 IN

## 2024-09-10 DIAGNOSIS — M05.741 RHEUMATOID ARTHRITIS INVOLVING BOTH HANDS WITH POSITIVE RHEUMATOID FACTOR: Primary | ICD-10-CM

## 2024-09-10 DIAGNOSIS — M54.50 CHRONIC MIDLINE LOW BACK PAIN WITHOUT SCIATICA: ICD-10-CM

## 2024-09-10 DIAGNOSIS — G89.29 CHRONIC MIDLINE LOW BACK PAIN WITHOUT SCIATICA: ICD-10-CM

## 2024-09-10 DIAGNOSIS — M41.25 OTHER IDIOPATHIC SCOLIOSIS, THORACOLUMBAR REGION: ICD-10-CM

## 2024-09-10 DIAGNOSIS — M05.742 RHEUMATOID ARTHRITIS INVOLVING BOTH HANDS WITH POSITIVE RHEUMATOID FACTOR: Primary | ICD-10-CM

## 2024-09-10 DIAGNOSIS — M79.641 BILATERAL HAND PAIN: ICD-10-CM

## 2024-09-10 DIAGNOSIS — M79.642 BILATERAL HAND PAIN: ICD-10-CM

## 2024-09-10 DIAGNOSIS — M25.50 POLYARTHRALGIA: ICD-10-CM

## 2024-09-10 PROCEDURE — 1160F RVW MEDS BY RX/DR IN RCRD: CPT

## 2024-09-10 PROCEDURE — 1159F MED LIST DOCD IN RCRD: CPT

## 2024-09-10 PROCEDURE — 1125F AMNT PAIN NOTED PAIN PRSNT: CPT

## 2024-09-10 PROCEDURE — 99214 OFFICE O/P EST MOD 30 MIN: CPT

## 2024-09-10 RX ORDER — MELOXICAM 7.5 MG/1
7.5 TABLET ORAL DAILY
Qty: 30 TABLET | Refills: 2 | Status: SHIPPED | OUTPATIENT
Start: 2024-09-10

## 2024-09-10 NOTE — PROGRESS NOTES
Chief Complaint  Chief Complaint   Patient presents with    Hand Pain     Patient c/o pain in bilateral hands. She states she has rheumatoid arthritis and osteoporosis and her fingers are starting to bend/curl and is painful.    Back Pain     Pt states she has scoliosis and has been having increased pain x 2.5 weeks in thoracic and lumbar spine       Subjective      Shahida Arroyo presents to [facility]  History of Present Illness  The patient presents for evaluation of pain in her hands and back.    She was diagnosed with rheumatoid arthritis earlier this year, confirmed through a screening test. Her primary concern is the bending of her hands, which are curving inwards and not straightening out. She fears losing mobility in her fingers, as her father did. She experiences significant inflammation and takes ibuprofen to manage it, but she is hesitant to use it regularly due to her uncle's death, which she attributes to frequent ibuprofen use. Tylenol does not provide her with relief.    She also has osteoporosis and scoliosis. She underwent a bone density test in April 2023 and has found Fosamax to be beneficial. She has not yet completed the additional labs ordered by Dr. Gonzalez.    She has been experiencing severe back pain for the past 2.5 weeks, which she initially attributed to her mattress. Despite changing her mattress and taking ibuprofen, the pain persists. She describes the sensation as if a nicol is being pushed up the middle of her back. She is aware of her scoliosis and wonders if it is worsening. She is reluctant to consider surgery or injections.    FAMILY HISTORY  Her father had rheumatoid arthritis.      Objective     Medical History:  Past Medical History:   Diagnosis Date    Arthritis     Cystocele, unspecified (CODE)     Foot pain, left 08/02/2018    Foot pain, right 08/02/2018    Heel pain     Hiatal hernia 07/07/2020    History of Clostridioides difficile colitis  07/07/2020    Osteoporosis      Past Surgical History:   Procedure Laterality Date    ABDOMINAL HYSTERECTOMY  08/2017    BLADDER REPAIR      ALITITS MIDURETHRAL SLING 8/2/17    BLADDER SURGERY      CYSTOSCOPY  08/2017    [facility] MIDURETHRAL SLING    DENTAL PROCEDURE      ORIF HUMERUS FRACTURE Left 09/02/2021    Procedure: HUMERUS OPEN  REDUCTION INTERNAL FIXATION LEFT;  Surgeon: Narciso Duque MD;  Location: [facility] OR;  Service: Orthopedics;  Laterality: Left;    SALPINGO OOPHORECTOMY  08/2017    SHOULDER MANIPULATION Left 02/15/2022    Procedure: LEFT SHOULDER MANIPULATION WITH STEROID INJECTION;  Surgeon: Narciso Duque MD;  [facility];  Service: Orthopedics;  Laterality: Left;    SKIN CANCER EXCISION        Social History     Tobacco Use    Smoking status: Never    Smokeless tobacco: Never   Vaping Use    Vaping status: Never Used   Substance Use Topics    Alcohol use: Never     Alcohol/week: 1.0 standard drink of alcohol     Types: 1 Glasses of wine per week    Drug use: Never     Family History   Problem Relation Age of Onset    Stroke Mother     Heart disease Mother     Cancer Mother     Breast cancer Mother     Diabetes Mother     Heart disease Father     Cancer Father     Diabetes Father     Cancer Maternal Grandmother     Cancer Maternal Grandfather     Cancer Paternal Grandmother     Cancer Paternal Grandfather     Cancer Other     Diabetes Other     Cancer Other     Diabetes Other     Malig Hyperthermia Neg Hx        Medications:  Prior to Admission medications    Medication Sig Start Date End Date Taking? Authorizing Provider   alendronate (Fosamax) 70 MG tablet Take 1 tablet by mouth Every 7 (Seven) Days. 3/18/24  Yes Marjan Gonzalez MD   busPIRone (BUSPAR) 10 MG tablet Take 1 tablet by mouth 3 (Three) Times a Day for 90 days. 2/6/24 5/6/24  Marjan Gonzalez MD   Diclofenac Sodium (VOLTAREN) 1 % gel gel Apply 4 g topically to the appropriate area as directed 4 (Four) Times a Day As  "Needed (pain). 9/27/23   Lanette Arguelles APRN        Allergies:   Patient has no known allergies.    Health Maintenance Due   Topic Date Due    COVID-19 Vaccine (1 - 2023-24 season) Never done         Vital Signs:   BP 92/46 (BP Location: Right arm, Patient Position: Sitting, Cuff Size: Adult)   Pulse 91   Temp 98.2 °F (36.8 °C) (Oral)   Ht 175.3 cm (69\")   Wt 65.9 kg (145 lb 4.8 oz)   SpO2 97%   BMI 21.46 kg/m²     Wt Readings from Last 3 Encounters:   09/10/24 65.9 kg (145 lb 4.8 oz)   02/06/24 71 kg (156 lb 9.6 oz)   01/29/24 70.3 kg (155 lb)     BP Readings from Last 3 Encounters:   09/10/24 92/46   02/06/24 106/68   01/29/24 116/79       BMI is within normal parameters. No other follow-up for BMI required.       Physical Exam  Vitals reviewed.   Constitutional:       Appearance: Normal appearance. She is well-developed.   HENT:      Head: Normocephalic and atraumatic.   Eyes:      Conjunctiva/sclera: Conjunctivae normal.      Pupils: Pupils are equal, round, and reactive to light.   Cardiovascular:      Rate and Rhythm: Normal rate and regular rhythm.      Heart sounds: No murmur heard.     No friction rub. No gallop.   Pulmonary:      Effort: Pulmonary effort is normal.      Breath sounds: Normal breath sounds. No wheezing or rhonchi.   Musculoskeletal:      Right hand: Deformity and tenderness present.      Left hand: Deformity and tenderness present.      Thoracic back: Tenderness present.      Lumbar back: Tenderness present.   Skin:     General: Skin is warm and dry.   Neurological:      Mental Status: She is alert and oriented to person, place, and time.   Psychiatric:         Mood and Affect: Mood and affect normal.         Behavior: Behavior normal.         Thought Content: Thought content normal.         Judgment: Judgment normal.       Physical Exam        Result Review :    The following data was reviewed by GABE Callahan on 09/10/24 at 15:47 EDT:        No Images in the " past 120 days found..    Results  Imaging  Mild to moderate thoracolumbar scoliosis, 21 degrees at the thoracic spine and 18 degrees at the L3-4.               Assessment and Plan    Diagnoses and all orders for this visit:    1. Rheumatoid arthritis involving both hands with positive rheumatoid factor (Primary)  -     meloxicam (Mobic) 7.5 MG tablet; Take 1 tablet by mouth Daily.  Dispense: 30 tablet; Refill: 2  -     Ambulatory Referral to Rheumatology    2. Other idiopathic scoliosis, thoracolumbar region  -     Ambulatory Referral to Pain Management    3. Polyarthralgia    4. Bilateral hand pain    5. Chronic midline low back pain without sciatica       Assessment & Plan  1. Rheumatoid Arthritis.  The patient's symptoms are consistent with rheumatoid arthritis, including pain and deformity in her hands. A prescription for meloxicam 7.5 mg once daily has been provided to help manage the pain and inflammation. She is advised to complete the labs ordered by her PCP to further evaluate the severity of her condition. A referral to a rheumatologist has been made to discuss specific RA medications that may help delay the progression of the disease.    2. Osteoporosis.  The patient has a history of osteoporosis, which may be contributing to her back pain. She reports that the medication Fosamax has been effective in improving her bone density. The next bone density test is scheduled for April 2025.    3. Scoliosis.  The patient has mild to moderate thoracolumbar scoliosis, which may be worsening her back pain. She describes the pain as feeling like a nicol is being pushed up in the middle of her back. A referral to a pain management specialist has been made to discuss potential treatments, including injections or anti-inflammatory medications. If her condition does not improve, a consultation with a spine specialist will be considered.    4. Back Pain.  The patient reports severe back pain over the last 2.5 weeks. She has  been taking ibuprofen, which provides some relief but is concerned about long-term use. Meloxicam 7.5 mg once daily has been prescribed to help manage the pain. If the pain persists, she will be referred to a spine specialist for further evaluation and potential treatment options.    Follow-up  The patient will follow up in 3 months with her PCP.          Smoking Cessation:    Shahida Arroyo  reports that she has never smoked. She has never used smokeless tobacco.             Follow Up   No follow-ups on file.  Patient was given instructions and counseling regarding her condition or for health maintenance advice. Please see specific information pulled into the AVS if appropriate.     Please note that portions of this note were completed with a voice recognition program.    Patient or patient representative verbalized consent for the use of Ambient Listening during the visit with  GABE Callahan for chart documentation. 9/10/2024  15:47 EDT

## 2024-09-19 ENCOUNTER — OFFICE VISIT (OUTPATIENT)
Dept: FAMILY MEDICINE CLINIC | Facility: CLINIC | Age: 64
End: 2024-09-19
Payer: COMMERCIAL

## 2024-09-19 VITALS
HEART RATE: 77 BPM | HEIGHT: 69 IN | SYSTOLIC BLOOD PRESSURE: 106 MMHG | BODY MASS INDEX: 21.58 KG/M2 | DIASTOLIC BLOOD PRESSURE: 60 MMHG | WEIGHT: 145.7 LBS | OXYGEN SATURATION: 99 % | TEMPERATURE: 98.1 F

## 2024-09-19 DIAGNOSIS — M41.25 OTHER IDIOPATHIC SCOLIOSIS, THORACOLUMBAR REGION: ICD-10-CM

## 2024-09-19 DIAGNOSIS — M54.50 ACUTE BILATERAL LOW BACK PAIN WITHOUT SCIATICA: Primary | ICD-10-CM

## 2024-09-19 PROCEDURE — 1125F AMNT PAIN NOTED PAIN PRSNT: CPT | Performed by: STUDENT IN AN ORGANIZED HEALTH CARE EDUCATION/TRAINING PROGRAM

## 2024-09-19 PROCEDURE — 98925 OSTEOPATH MANJ 1-2 REGIONS: CPT | Performed by: STUDENT IN AN ORGANIZED HEALTH CARE EDUCATION/TRAINING PROGRAM

## 2024-09-19 PROCEDURE — 99214 OFFICE O/P EST MOD 30 MIN: CPT | Performed by: STUDENT IN AN ORGANIZED HEALTH CARE EDUCATION/TRAINING PROGRAM

## 2024-09-19 RX ORDER — ACETAMINOPHEN 500 MG
1000 TABLET ORAL EVERY 6 HOURS PRN
Qty: 30 TABLET | Refills: 1 | Status: SHIPPED | OUTPATIENT
Start: 2024-09-19

## 2024-09-19 RX ORDER — CYCLOBENZAPRINE HCL 5 MG
5 TABLET ORAL 3 TIMES DAILY PRN
Qty: 30 TABLET | Refills: 0 | Status: SHIPPED | OUTPATIENT
Start: 2024-09-19

## 2024-09-20 ENCOUNTER — HOSPITAL ENCOUNTER (OUTPATIENT)
Dept: GENERAL RADIOLOGY | Facility: HOSPITAL | Age: 64
Discharge: HOME | End: 2024-09-20
Payer: COMMERCIAL

## 2024-09-20 DIAGNOSIS — M54.50 ACUTE BILATERAL LOW BACK PAIN WITHOUT SCIATICA: ICD-10-CM

## 2024-09-20 PROCEDURE — 72114 X-RAY EXAM L-S SPINE BENDING: CPT

## 2024-09-20 PROCEDURE — 72072 X-RAY EXAM THORAC SPINE 3VWS: CPT

## 2024-09-20 PROCEDURE — 72081 X-RAY EXAM ENTIRE SPI 1 VW: CPT

## 2024-11-11 RX ORDER — ALENDRONATE SODIUM 70 MG/1
70 TABLET ORAL
Qty: 12 TABLET | Refills: 1 | Status: SHIPPED | OUTPATIENT
Start: 2024-11-11

## 2024-11-21 ENCOUNTER — TELEPHONE (OUTPATIENT)
Dept: FAMILY MEDICINE CLINIC | Facility: CLINIC | Age: 64
End: 2024-11-21
Payer: COMMERCIAL

## 2024-11-21 NOTE — TELEPHONE ENCOUNTER
Caller: Shahida Arroyo    Relationship: Self    Best call back number: [phone]    What medication are you requesting: MYRBETRIQ, cyclobenzaprine (FLEXERIL) 5 MG tablet   ARTHRITIS PAIN MEDICATION      If a prescription is needed, what is your preferred pharmacy and phone number: [id number] [address] AT [facility] [phone] [facility] [phone]      Additional notes:PATIENT STATES THE SAMPLES FOR THE ARTHRITIS PAIN WORKED AND SHE WOULD LIKE THAT CALLED INTO THE PHARMACY.       PATIENT ASKS FOR A CALL IF THERE ARE ANY ISSUES OR CONCERNS

## 2024-11-22 DIAGNOSIS — M05.742 RHEUMATOID ARTHRITIS INVOLVING BOTH HANDS WITH POSITIVE RHEUMATOID FACTOR: ICD-10-CM

## 2024-11-22 DIAGNOSIS — M54.50 ACUTE BILATERAL LOW BACK PAIN WITHOUT SCIATICA: ICD-10-CM

## 2024-11-22 DIAGNOSIS — M05.741 RHEUMATOID ARTHRITIS INVOLVING BOTH HANDS WITH POSITIVE RHEUMATOID FACTOR: ICD-10-CM

## 2024-11-22 RX ORDER — MIRABEGRON 50 MG/1
50 TABLET, FILM COATED, EXTENDED RELEASE ORAL DAILY
Qty: 90 TABLET | Refills: 0 | Status: SHIPPED | OUTPATIENT
Start: 2024-11-22

## 2024-11-22 RX ORDER — CYCLOBENZAPRINE HCL 5 MG
5 TABLET ORAL 3 TIMES DAILY PRN
Qty: 90 TABLET | Refills: 0 | Status: SHIPPED | OUTPATIENT
Start: 2024-11-22

## 2024-11-22 RX ORDER — MELOXICAM 7.5 MG/1
7.5 TABLET ORAL DAILY
Qty: 30 TABLET | Refills: 0 | Status: SHIPPED | OUTPATIENT
Start: 2024-11-22

## 2024-12-06 DIAGNOSIS — M05.741 RHEUMATOID ARTHRITIS INVOLVING BOTH HANDS WITH POSITIVE RHEUMATOID FACTOR: ICD-10-CM

## 2024-12-06 DIAGNOSIS — M05.742 RHEUMATOID ARTHRITIS INVOLVING BOTH HANDS WITH POSITIVE RHEUMATOID FACTOR: ICD-10-CM

## 2024-12-09 RX ORDER — MELOXICAM 7.5 MG/1
7.5 TABLET ORAL DAILY
Qty: 30 TABLET | Refills: 0 | Status: SHIPPED | OUTPATIENT
Start: 2024-12-09

## 2024-12-20 ENCOUNTER — TELEPHONE (OUTPATIENT)
Dept: FAMILY MEDICINE CLINIC | Facility: CLINIC | Age: 64
End: 2024-12-20

## 2024-12-20 ENCOUNTER — OFFICE VISIT (OUTPATIENT)
Dept: FAMILY MEDICINE CLINIC | Facility: CLINIC | Age: 64
End: 2024-12-20
Payer: COMMERCIAL

## 2024-12-20 VITALS
WEIGHT: 139.3 LBS | DIASTOLIC BLOOD PRESSURE: 62 MMHG | BODY MASS INDEX: 20.63 KG/M2 | SYSTOLIC BLOOD PRESSURE: 108 MMHG | HEIGHT: 69 IN | OXYGEN SATURATION: 98 % | TEMPERATURE: 98 F | HEART RATE: 93 BPM

## 2024-12-20 DIAGNOSIS — R11.0 NAUSEA: ICD-10-CM

## 2024-12-20 DIAGNOSIS — N30.01 ACUTE CYSTITIS WITH HEMATURIA: ICD-10-CM

## 2024-12-20 DIAGNOSIS — R30.0 DYSURIA: Primary | ICD-10-CM

## 2024-12-20 DIAGNOSIS — R10.2 SUPRAPUBIC PRESSURE: ICD-10-CM

## 2024-12-20 DIAGNOSIS — R51.9 ACUTE NONINTRACTABLE HEADACHE, UNSPECIFIED HEADACHE TYPE: ICD-10-CM

## 2024-12-20 LAB
BACTERIA UR QL AUTO: ABNORMAL /HPF
BILIRUB BLD-MCNC: NEGATIVE MG/DL
BILIRUB UR QL STRIP: NEGATIVE
CLARITY UR: ABNORMAL
CLARITY, POC: ABNORMAL
COLOR UR: YELLOW
COLOR UR: YELLOW
GLUCOSE UR STRIP-MCNC: NEGATIVE MG/DL
GLUCOSE UR STRIP-MCNC: NEGATIVE MG/DL
HGB UR QL STRIP.AUTO: NEGATIVE
HOLD SPECIMEN: NORMAL
HYALINE CASTS UR QL AUTO: ABNORMAL /LPF
KETONES UR QL STRIP: NEGATIVE
KETONES UR QL: NEGATIVE
LEUKOCYTE EST, POC: ABNORMAL
LEUKOCYTE ESTERASE UR QL STRIP.AUTO: ABNORMAL
NITRITE UR QL STRIP: NEGATIVE
NITRITE UR-MCNC: NEGATIVE MG/ML
PH UR STRIP.AUTO: 5.5 [PH] (ref 5–8)
PH UR: 5.5 [PH] (ref 5–8)
PROT UR QL STRIP: NEGATIVE
PROT UR STRIP-MCNC: NEGATIVE MG/DL
RBC # UR STRIP: ABNORMAL /HPF
RBC # UR STRIP: ABNORMAL /UL
REF LAB TEST METHOD: ABNORMAL
SP GR UR STRIP: 1.03 (ref 1–1.03)
SP GR UR: 1.03 (ref 1–1.03)
SQUAMOUS #/AREA URNS HPF: ABNORMAL /HPF
UROBILINOGEN UR QL STRIP: ABNORMAL
UROBILINOGEN UR QL: NORMAL
WBC # UR STRIP: ABNORMAL /HPF

## 2024-12-20 PROCEDURE — 81001 URINALYSIS AUTO W/SCOPE: CPT

## 2024-12-20 PROCEDURE — 87086 URINE CULTURE/COLONY COUNT: CPT

## 2024-12-20 RX ORDER — PHENAZOPYRIDINE HYDROCHLORIDE 100 MG/1
100 TABLET, FILM COATED ORAL 3 TIMES DAILY PRN
Qty: 6 TABLET | Refills: 0 | Status: SHIPPED | OUTPATIENT
Start: 2024-12-20

## 2024-12-20 RX ORDER — IBUPROFEN 600 MG/1
600 TABLET, FILM COATED ORAL EVERY 6 HOURS PRN
Qty: 20 TABLET | Refills: 0 | Status: SHIPPED | OUTPATIENT
Start: 2024-12-20

## 2024-12-20 NOTE — TELEPHONE ENCOUNTER
Caller: Shahida Arroyo    Relationship to patient: Self    Best call back number: [phone]     Patient is needing: PATIENT CALLED STATING SHE WOULD LIKE TO SWITCH CARE TO DR LOCKWOOD. PATIENT STATES SHE IS UNABLE TO SEE DR ELIZALDE AS NEEDED.

## 2024-12-20 NOTE — PROGRESS NOTES
Chief Complaint  Chief Complaint   Patient presents with    Dysuria     Pt was seen at an urgent care on 11/04/2024 and was diagnosed with a UTI (e. Coli). Had not felt better.    Headache     X 3 days       Subjective      Shahida Arroyo presents to [facility]  History of Present Illness  The patient is a 64-year-old female who presents today with painful urination and a headache.    She has a history of a bladder sling and is currently on Myrbetriq for overactive bladder. She was seen at urgent care on 11/04/2024 and treated for a UTI, which was found to be caused by E. coli via a culture. She was prescribed Macrobid and Pyridium and did complete treatment. She reports persistent discomfort associated with her bladder sling, necessitating frequent urination to alleviate pressure-induced pain. She expresses concern that her symptoms may be indicative of a worsening E. coli infection or ineffective antibiotic treatment. She does not typically experience fevers, even during severe illnesses. She recalls that her previous E. coli infection was quantified at 100. She reports no changes in respiratory function, coughing, or wheezing. She experienced mild abdominal soreness during exhalation over the past two days but reports no flank or side pain. She finds Pyridium beneficial for managing bladder spasms and inflammation.    She describes her headache as flu-like in nature. She tested negative for COVID-19 the previous night and does not believe she has strep throat. She experiences mild throat discomfort and nasal congestion. She has been unable to concentrate for the past four days due to her ongoing headache and has been experiencing nausea, leading her to suspect a possible link to her E. coli infection. She experienced an earache concurrent with her E. coli infection. She has not taken any medication for her headache. She takes meloxicam for arthritis pain and cyclobenzaprine for  muscle spasms, both of which she finds effective, but they do not alleviate her headache.          Objective     Medical History:  Past Medical History:   Diagnosis Date    Arthritis     Cystocele, unspecified (CODE)     Foot pain, left 08/02/2018    Foot pain, right 08/02/2018    Heel pain     Hiatal hernia 07/07/2020    History of Clostridioides difficile colitis 07/07/2020    Osteoporosis     Rheumatoid arthritis     Scoliosis      Past Surgical History:   Procedure Laterality Date    ABDOMINAL HYSTERECTOMY  08/2017    BLADDER REPAIR      ALITITS MIDURETHRAL SLING 8/2/17    BLADDER SURGERY      CYSTOSCOPY  08/2017    ATILIS MIDURETHRAL SLING    DENTAL PROCEDURE      ORIF HUMERUS FRACTURE Left 09/02/2021    Procedure: HUMERUS OPEN  REDUCTION INTERNAL FIXATION LEFT;  Surgeon: Narciso Duque MD;  Location: [facility] MAIN OR;  Service: Orthopedics;  Laterality: Left;    SALPINGO OOPHORECTOMY  08/2017    SHOULDER MANIPULATION Left 02/15/2022    Procedure: LEFT SHOULDER MANIPULATION WITH STEROID INJECTION;  Surgeon: Narciso Duque MD;  [place] [facility] [place] [facility] [place]  Service: [place]opedics;  Laterality: Left;    SKIN CANCER EXCISION      melanoma      Social History     Tobacco Use    Smoking status: Never    Smokeless tobacco: Never   Vaping Use    Vaping status: Never Used   Substance Use Topics    Alcohol use: Never     Alcohol/week: 1.0 standard drink of alcohol     Types: 1 Glasses of wine per week    Drug use: Never     Family History   Problem Relation Age of Onset    Stroke Mother     Heart disease Mother     Cancer Mother     Breast cancer Mother     Diabetes Mother     Heart disease Father     Cancer Father     Diabetes Father     Cancer Maternal Grandmother     Cancer Maternal Grandfather     Cancer Paternal Grandmother     Cancer Paternal Grandfather     Cancer Other     Diabetes Other     Cancer Other     Diabetes Other     Malig Hyperthermia Neg Hx        Medications:  Prior to Admission medications    Medication  "Sig Start Date End Date Taking? Authorizing Provider   acetaminophen (TYLENOL) 500 MG tablet Take 2 tablets by mouth Every 6 (Six) Hours As Needed for Mild Pain or Moderate Pain. 9/19/24  Yes Diogo Brooks DO   alendronate (FOSAMAX) 70 MG tablet TAKE 1 TABLET BY MOUTH EVERY 7 DAYS 11/11/24  Yes Marjan Gonzalez MD   cyclobenzaprine (FLEXERIL) 5 MG tablet Take 1 tablet by mouth 3 (Three) Times a Day As Needed for Muscle Spasms. 11/22/24  Yes Marjan Gonzalez MD   meloxicam (MOBIC) 7.5 MG tablet TAKE 1 TABLET BY MOUTH DAILY 12/9/24  Yes Magnolia Page APRN   Mirabegron ER (Myrbetriq) 50 MG tablet sustained-release 24 hour 24 hr tablet Take 50 mg by mouth Daily. 11/22/24  Yes Marjan Gonzalez MD        Allergies:   Patient has no known allergies.    Health Maintenance Due   Topic Date Due    COVID-19 Vaccine (1 - 2024-25 season) Never done         Vital Signs:   /62 (BP Location: Left arm, Patient Position: Sitting, Cuff Size: Adult)   Pulse 93   Temp 98 °F (36.7 °C) (Oral)   Ht 175.3 cm (69\")   Wt 63.2 kg (139 lb 4.8 oz)   SpO2 98%   BMI 20.57 kg/m²     Wt Readings from Last 3 Encounters:   12/20/24 63.2 kg (139 lb 4.8 oz)   09/19/24 66.1 kg (145 lb 11.2 oz)   09/10/24 65.9 kg (145 lb 4.8 oz)     BP Readings from Last 3 Encounters:   12/20/24 108/62   09/19/24 106/60   09/10/24 92/46       BMI is within normal parameters. No other follow-up for BMI required.       Physical Exam  Vitals reviewed.   Constitutional:       General: She is not in acute distress.     Appearance: Normal appearance. She is well-developed. She is not ill-appearing.   HENT:      Head: Normocephalic and atraumatic.      Right Ear: Tympanic membrane normal. No tenderness. No middle ear effusion. Tympanic membrane is not erythematous.      Left Ear: Tympanic membrane normal. No tenderness.  No middle ear effusion. Tympanic membrane is not erythematous.      Nose: No congestion or rhinorrhea.      Right Sinus: No " maxillary sinus tenderness or frontal sinus tenderness.      Left Sinus: No maxillary sinus tenderness or frontal sinus tenderness.      Mouth/Throat:      Palate: No lesions.      Pharynx: Oropharynx is clear. No posterior oropharyngeal erythema.   Eyes:      Conjunctiva/sclera: Conjunctivae normal.      Pupils: Pupils are equal, round, and reactive to light.   Cardiovascular:      Rate and Rhythm: Normal rate and regular rhythm.      Heart sounds: No murmur heard.     No friction rub. No gallop.   Pulmonary:      Effort: Pulmonary effort is normal.      Breath sounds: Normal breath sounds. No wheezing or rhonchi.   Abdominal:      General: Bowel sounds are normal. There is no distension.      Palpations: Abdomen is soft.      Tenderness: There is no abdominal tenderness.   Skin:     General: Skin is warm and dry.   Neurological:      Mental Status: She is alert and oriented to person, place, and time.      Cranial Nerves: No cranial nerve deficit.   Psychiatric:         Mood and Affect: Mood and affect normal.         Behavior: Behavior normal.         Thought Content: Thought content normal.         Judgment: Judgment normal.       Physical Exam  Throat appears normal. No signs of strep. Ears appear normal. No fluid present.  Lungs were auscultated.      Result Review :    The following data was reviewed by GABE Callahan on 12/20/24 at 11:43 EST:    Lab Results   Component Value Date    RBCUA NONE SEEN 01/20/2020    COLORU Yellow 12/20/2024    CLARITYU Slightly Cloudy (A) 12/20/2024    PHUR 5.5 12/20/2024    SPECGRAVUR 1.010 01/20/2020    GLUCOSEU NEGATIVE 01/20/2020    KETONESU Negative 12/20/2024    BILIRUBINUR Negative 12/20/2024    BLOODU NEGATIVE 01/20/2020    LEUKOCYTESUR Small (1+) (A) 12/20/2024    NITRITEU NEGATIVE 01/20/2020    UROBILINOGEN Normal 12/20/2024         Results  Laboratory Studies  Urinalysis shows small amount of leukocytes and trace amount of blood.               Assessment  and Plan    Diagnoses and all orders for this visit:    1. Dysuria (Primary)  -     POCT urinalysis dipstick, manual    2. Suprapubic pressure    3. Acute nonintractable headache, unspecified headache type  -     ibuprofen (ADVIL,MOTRIN) 600 MG tablet; Take 1 tablet by mouth Every 6 (Six) Hours As Needed for Mild Pain.  Dispense: 20 tablet; Refill: 0    4. Nausea    5. Acute cystitis with hematuria  -     Urinalysis With Culture If Indicated - Urine, Clean Catch  -     amoxicillin-clavulanate (AUGMENTIN) 875-125 MG per tablet; Take 1 tablet by mouth 2 (Two) Times a Day for 7 days.  Dispense: 14 tablet; Refill: 0  -     phenazopyridine (Pyridium) 100 MG tablet; Take 1 tablet by mouth 3 (Three) Times a Day As Needed for Bladder Spasms.  Dispense: 6 tablet; Refill: 0       Assessment & Plan  1. Urinary Tract Infection (UTI).  The patient's symptoms of burning and pressure, along with the urinalysis showing small leukocytes and trace blood, suggest a possible persistent UTI. She was previously treated with Macrobid and Pyridium for an E. coli infection. A urine culture will be sent for further analysis to confirm the presence of E. coli. She will be started on Augmentin 875 mg twice daily for 7 days, which is effective against E. coli and may also address any developing sinus infection. Pyridium will be continued to help with bladder spasms and inflammation. If the culture results indicate a need for a different antibiotic, she will be contacted.    2. Headache.  The patient's headache may be unrelated to her UTI. She reports no relief from her current medications, including meloxicam and Flexeril. Extra strength ibuprofen will be prescribed to manage her headache. She is advised to take it only when experiencing a headache or pain and not for long-term daily use. She can continue taking meloxicam and Flexeril but should avoid excessive ibuprofen use due to its similarity to meloxicam.  Increasing water intake may also  be beneficial and over-the-counter Sudafed may help alleviate sinus pressure.    PROCEDURE  The patient has a history of a bladder sling procedure.          Smoking Cessation:    Shahida Arroyo  reports that she has never smoked. She has never used smokeless tobacco.            Follow Up   Return if symptoms worsen or fail to improve.  Patient was given instructions and counseling regarding her condition or for health maintenance advice. Please see specific information pulled into the AVS if appropriate.     Please note that portions of this note were completed with a voice recognition program.    Patient or patient representative verbalized consent for the use of Ambient Listening during the visit with  GABE Callahan for chart documentation. 12/20/2024  11:42 EST

## 2024-12-21 LAB — BACTERIA SPEC AEROBE CULT: NORMAL

## 2024-12-23 ENCOUNTER — PATIENT MESSAGE (OUTPATIENT)
Dept: FAMILY MEDICINE CLINIC | Facility: CLINIC | Age: 64
End: 2024-12-23
Payer: COMMERCIAL

## 2024-12-23 DIAGNOSIS — N30.01 ACUTE CYSTITIS WITH HEMATURIA: Primary | ICD-10-CM

## 2024-12-23 NOTE — TELEPHONE ENCOUNTER
[employer] message sent to patient regarding the process for possibly changing providers.  Patient advised she will receive a message once a determination has been made.

## 2025-02-03 ENCOUNTER — CLINICAL SUPPORT (OUTPATIENT)
Dept: FAMILY MEDICINE CLINIC | Facility: CLINIC | Age: 65
End: 2025-02-03
Payer: COMMERCIAL

## 2025-02-03 DIAGNOSIS — Z11.1 TUBERCULOSIS SCREENING: Primary | ICD-10-CM

## 2025-02-03 PROCEDURE — 86580 TB INTRADERMAL TEST: CPT | Performed by: FAMILY MEDICINE

## 2025-02-05 LAB
INDURATION: 0 MM (ref 0–10)
Lab: NORMAL
Lab: NORMAL
TB SKIN TEST: NORMAL

## 2025-02-10 DIAGNOSIS — M54.50 ACUTE BILATERAL LOW BACK PAIN WITHOUT SCIATICA: ICD-10-CM

## 2025-02-10 RX ORDER — CYCLOBENZAPRINE HCL 5 MG
5 TABLET ORAL 3 TIMES DAILY PRN
Qty: 90 TABLET | Refills: 0 | Status: SHIPPED | OUTPATIENT
Start: 2025-02-10

## 2025-02-10 RX ORDER — PSEUDOEPHED/ACETAMINOPH/DIPHEN 30MG-500MG
TABLET ORAL
Qty: 30 TABLET | Refills: 1 | Status: SHIPPED | OUTPATIENT
Start: 2025-02-10

## 2025-02-21 RX ORDER — MIRABEGRON 50 MG/1
50 TABLET, FILM COATED, EXTENDED RELEASE ORAL DAILY
Qty: 90 TABLET | Refills: 0 | Status: SHIPPED | OUTPATIENT
Start: 2025-02-21

## 2025-02-28 ENCOUNTER — PRIOR AUTHORIZATION (OUTPATIENT)
Dept: FAMILY MEDICINE CLINIC | Facility: CLINIC | Age: 65
End: 2025-02-28
Payer: COMMERCIAL

## 2025-02-28 NOTE — TELEPHONE ENCOUNTER
Prior Authorization for Kriss SOLORZANO APPROVED     Approved today by [place] [employer] 2017  The request has been approved. The authorization is effective from 02/28/2025 to 02/28/2026, as long as the member is enrolled in their current health plan. A written notification letter will follow with additional details.  Effective Date: 2/27/2025  Authorization Expiration Date: 2/27/2026

## 2025-03-10 ENCOUNTER — TELEPHONE (OUTPATIENT)
Dept: FAMILY MEDICINE CLINIC | Facility: CLINIC | Age: 65
End: 2025-03-10

## 2025-03-10 NOTE — TELEPHONE ENCOUNTER
Caller: [facility]    Relationship to patient: Other    Best call back number: [phone]- CATHLEEN    Patient is needing: CALLER STATED SHE TRIED TO SEND AN FAX BUT IT WILL NOT GO THROUGH DO TO BEING BUSY.    SHE WANTS TO LET DR. ELIZALDE KNOW THAT SHE IS SCHEDULED TO BE SEEN ON     3.13.2025 AT 0745 WITH DR. LUC CR.    ANY FURTHER QUESTIONS PLEASE REACH OUT.

## 2025-03-10 NOTE — TELEPHONE ENCOUNTER
Message sent to the referral team to see if they can get patient scheduled with Dr. Lei in [facility].

## 2025-03-10 NOTE — TELEPHONE ENCOUNTER
Caller: Shahida Arroyo    Relationship: Self    Best call back number: [phone]    What is the medical concern/diagnosis: M05.741,M05.742 (ICD-10-CM) - Rheumatoid arthritis involving both hands with positive rheumatoid factor     What specialty or service is being requested: RHEUMATOLOGY     What is the office location: [place] OR [place]     Any additional details: PATIENT STATES THEY ARE CURRENTLY REFERRED TO A PROVIDER IN [place] BUT PREFERS A PROVIDER IN [facility].

## 2025-03-12 ENCOUNTER — TELEPHONE (OUTPATIENT)
Dept: FAMILY MEDICINE CLINIC | Facility: CLINIC | Age: 65
End: 2025-03-12

## 2025-03-12 NOTE — TELEPHONE ENCOUNTER
Caller: Shahida Arroyo    Relationship to patient: Self    Best call back number: [phone]     Patient is needing: PATIENT REQUESTING DATES OF SERVICE WHEN SHE WAS SEEN AT [facility] TO TREAT HER MELANOMA.     PATIENT IS CONCERNED THAT ANOTHER SPOT IS AROUND THE LAST SURGICAL AREA (LEFT HAND SIDE OF BACK).     PATIENTS NEXT SCHEDULED APPOINTMENT FOR A SKIN CHECK IS 7.2025.     CONTACT PATIENT TO ADVISE.

## 2025-03-13 NOTE — TELEPHONE ENCOUNTER
[employer] message sent to patient advising her to call [facility] for the information she is requesting.

## 2025-03-17 ENCOUNTER — OFFICE VISIT (OUTPATIENT)
Dept: FAMILY MEDICINE CLINIC | Facility: CLINIC | Age: 65
End: 2025-03-17
Payer: COMMERCIAL

## 2025-03-17 VITALS
BODY MASS INDEX: 22.04 KG/M2 | HEIGHT: 69 IN | DIASTOLIC BLOOD PRESSURE: 66 MMHG | TEMPERATURE: 97.8 F | WEIGHT: 148.8 LBS | SYSTOLIC BLOOD PRESSURE: 108 MMHG

## 2025-03-17 DIAGNOSIS — Z13.220 SCREENING FOR HYPERLIPIDEMIA: ICD-10-CM

## 2025-03-17 DIAGNOSIS — R10.11 RIGHT UPPER QUADRANT PAIN: ICD-10-CM

## 2025-03-17 DIAGNOSIS — N39.0 RECURRENT UTI: ICD-10-CM

## 2025-03-17 DIAGNOSIS — R07.9 CHEST PAIN, UNSPECIFIED TYPE: Primary | ICD-10-CM

## 2025-03-17 DIAGNOSIS — N30.01 ACUTE CYSTITIS WITH HEMATURIA: ICD-10-CM

## 2025-03-17 DIAGNOSIS — M05.741 RHEUMATOID ARTHRITIS INVOLVING BOTH HANDS WITH POSITIVE RHEUMATOID FACTOR: ICD-10-CM

## 2025-03-17 DIAGNOSIS — Z82.49 FAMILY HISTORY OF HEART ATTACK: ICD-10-CM

## 2025-03-17 DIAGNOSIS — R00.2 PALPITATIONS: ICD-10-CM

## 2025-03-17 DIAGNOSIS — M05.742 RHEUMATOID ARTHRITIS INVOLVING BOTH HANDS WITH POSITIVE RHEUMATOID FACTOR: ICD-10-CM

## 2025-03-17 LAB
ALBUMIN SERPL-MCNC: 4.6 G/DL (ref 3.5–5.2)
ALBUMIN/GLOB SERPL: 1.7 G/DL
ALP SERPL-CCNC: 82 U/L (ref 39–117)
ALT SERPL W P-5'-P-CCNC: 22 U/L (ref 1–33)
ANION GAP SERPL CALCULATED.3IONS-SCNC: 10.4 MMOL/L (ref 5–15)
AST SERPL-CCNC: 22 U/L (ref 1–32)
BASOPHILS # BLD AUTO: 0.04 10*3/MM3 (ref 0–0.2)
BASOPHILS NFR BLD AUTO: 0.7 % (ref 0–1.5)
BILIRUB SERPL-MCNC: 0.3 MG/DL (ref 0–1.2)
BUN SERPL-MCNC: 14 MG/DL (ref 8–23)
BUN/CREAT SERPL: 14.1 (ref 7–25)
CALCIUM SPEC-SCNC: 9.5 MG/DL (ref 8.6–10.5)
CHLORIDE SERPL-SCNC: 102 MMOL/L (ref 98–107)
CHOLEST SERPL-MCNC: 251 MG/DL (ref 0–200)
CO2 SERPL-SCNC: 26.6 MMOL/L (ref 22–29)
CREAT SERPL-MCNC: 0.99 MG/DL (ref 0.57–1)
DEPRECATED RDW RBC AUTO: 42.2 FL (ref 37–54)
EGFRCR SERPLBLD CKD-EPI 2021: 64.6 ML/MIN/1.73
EOSINOPHIL # BLD AUTO: 0.1 10*3/MM3 (ref 0–0.4)
EOSINOPHIL NFR BLD AUTO: 1.8 % (ref 0.3–6.2)
ERYTHROCYTE [DISTWIDTH] IN BLOOD BY AUTOMATED COUNT: 12.1 % (ref 12.3–15.4)
GLOBULIN UR ELPH-MCNC: 2.7 GM/DL
GLUCOSE SERPL-MCNC: 98 MG/DL (ref 65–99)
HCT VFR BLD AUTO: 44.6 % (ref 34–46.6)
HDLC SERPL-MCNC: 55 MG/DL (ref 40–60)
HGB BLD-MCNC: 14.4 G/DL (ref 12–15.9)
IMM GRANULOCYTES # BLD AUTO: 0.02 10*3/MM3 (ref 0–0.05)
IMM GRANULOCYTES NFR BLD AUTO: 0.4 % (ref 0–0.5)
LDLC SERPL CALC-MCNC: 162 MG/DL (ref 0–100)
LDLC/HDLC SERPL: 2.89 {RATIO}
LYMPHOCYTES # BLD AUTO: 1.6 10*3/MM3 (ref 0.7–3.1)
LYMPHOCYTES NFR BLD AUTO: 28.9 % (ref 19.6–45.3)
MCH RBC QN AUTO: 30.2 PG (ref 26.6–33)
MCHC RBC AUTO-ENTMCNC: 32.3 G/DL (ref 31.5–35.7)
MCV RBC AUTO: 93.5 FL (ref 79–97)
MONOCYTES # BLD AUTO: 0.51 10*3/MM3 (ref 0.1–0.9)
MONOCYTES NFR BLD AUTO: 9.2 % (ref 5–12)
NEUTROPHILS NFR BLD AUTO: 3.27 10*3/MM3 (ref 1.7–7)
NEUTROPHILS NFR BLD AUTO: 59 % (ref 42.7–76)
NRBC BLD AUTO-RTO: 0 /100 WBC (ref 0–0.2)
PLATELET # BLD AUTO: 244 10*3/MM3 (ref 140–450)
PMV BLD AUTO: 11.6 FL (ref 6–12)
POTASSIUM SERPL-SCNC: 4.3 MMOL/L (ref 3.5–5.2)
PROT SERPL-MCNC: 7.3 G/DL (ref 6–8.5)
RBC # BLD AUTO: 4.77 10*6/MM3 (ref 3.77–5.28)
SODIUM SERPL-SCNC: 139 MMOL/L (ref 136–145)
TRIGL SERPL-MCNC: 184 MG/DL (ref 0–150)
TSH SERPL DL<=0.05 MIU/L-ACNC: 3.87 UIU/ML (ref 0.27–4.2)
VLDLC SERPL-MCNC: 34 MG/DL (ref 5–40)
WBC NRBC COR # BLD AUTO: 5.54 10*3/MM3 (ref 3.4–10.8)

## 2025-03-17 PROCEDURE — 99214 OFFICE O/P EST MOD 30 MIN: CPT | Performed by: STUDENT IN AN ORGANIZED HEALTH CARE EDUCATION/TRAINING PROGRAM

## 2025-03-17 PROCEDURE — 1125F AMNT PAIN NOTED PAIN PRSNT: CPT | Performed by: STUDENT IN AN ORGANIZED HEALTH CARE EDUCATION/TRAINING PROGRAM

## 2025-03-17 PROCEDURE — 84443 ASSAY THYROID STIM HORMONE: CPT | Performed by: STUDENT IN AN ORGANIZED HEALTH CARE EDUCATION/TRAINING PROGRAM

## 2025-03-17 PROCEDURE — 93000 ELECTROCARDIOGRAM COMPLETE: CPT | Performed by: STUDENT IN AN ORGANIZED HEALTH CARE EDUCATION/TRAINING PROGRAM

## 2025-03-17 PROCEDURE — 80053 COMPREHEN METABOLIC PANEL: CPT | Performed by: STUDENT IN AN ORGANIZED HEALTH CARE EDUCATION/TRAINING PROGRAM

## 2025-03-17 PROCEDURE — 87086 URINE CULTURE/COLONY COUNT: CPT | Performed by: FAMILY MEDICINE

## 2025-03-17 PROCEDURE — 80061 LIPID PANEL: CPT | Performed by: STUDENT IN AN ORGANIZED HEALTH CARE EDUCATION/TRAINING PROGRAM

## 2025-03-17 PROCEDURE — 85025 COMPLETE CBC W/AUTO DIFF WBC: CPT | Performed by: STUDENT IN AN ORGANIZED HEALTH CARE EDUCATION/TRAINING PROGRAM

## 2025-03-17 RX ORDER — SUCRALFATE ORAL 1 G/10ML
SUSPENSION ORAL
COMMUNITY
Start: 2025-03-05

## 2025-03-17 RX ORDER — PANTOPRAZOLE SODIUM 40 MG/1
1 TABLET, DELAYED RELEASE ORAL DAILY
COMMUNITY
Start: 2025-03-05

## 2025-03-17 NOTE — PROGRESS NOTES
Chief Complaint  Chest Pain (Pressure when she lays down.)    Subjective      Shahida Arroyo is a 64 y.o. female who presents to [facility]     History of Present Illness  The patient is a 64-year-old female presenting with chest pressure ongoing since last week.    Chest Pressure  - Persistent chest discomfort described as a jabbing sensation between her shoulder blades, more pronounced in a supine position.  - Discomfort present for 3-4 weeks, accompanied by mid-back pressure.  - No associated anxiety or nervousness.  - Multiple EKGs were inconclusive.  - No radiating pain.  - Pain is more noticeable at night, exacerbated by physical activity, and alleviated by rest.  - Occasional palpitations coincide with chest pain.  - No chest pain at rest.  - Prescribed sucralfate, pantoprazole, amoxicillin, and clavulanate at urgent care, with no relief.    Recurrent E. coli Infections  - History of recurrent E. coli infections post-bladder sling procedure in 2003, despite clear urinalysis.  - Awaiting CT bladder results from last Thursday.  - No current urinary symptoms.    Weight Loss  - Experienced weight loss since starting monthly alendronate therapy.  - Took the most recent dose this morning, does not believe it exacerbates chest pain.    Rheumatoid arthritis  - Patient is requested to see a provider in [place] or [place]  - [place] that last week due to not wanting to travel to [place].    Supplemental information: Diet includes one meal per day with small snacks, two cups of coffee daily. No tobacco use.    SOCIAL HISTORY  She does not smoke.    FAMILY HISTORY  Her brother recently had a heart attack.    MEDICATIONS  Current: sucralfate, pantoprazole, amoxicillin/clavulanate, alendronate       Objective   Vital Signs:   Vitals:    03/17/25 0751   BP: 108/66   BP Location: Right arm   Patient Position: Sitting   Temp:  "97.8 °F (36.6 °C)   TempSrc: Oral   Weight: 67.5 kg (148 lb 12.8 oz)   Height: 175.3 cm (69\")     Body mass index is 21.97 kg/m².    Wt Readings from Last 3 Encounters:   03/17/25 67.5 kg (148 lb 12.8 oz)   12/20/24 63.2 kg (139 lb 4.8 oz)   09/19/24 66.1 kg (145 lb 11.2 oz)     BP Readings from Last 3 Encounters:   03/17/25 108/66   12/20/24 108/62   09/19/24 106/60       Health Maintenance   Topic Date Due    Pneumococcal Vaccine 50+ (1 of 1 - PCV) Never done    ZOSTER VACCINE (1 of 2) Never done    COVID-19 Vaccine (1 - 2024-25 season) Never done    ANNUAL PHYSICAL  02/06/2025    INFLUENZA VACCINE  03/02/2026 (Originally 7/1/2024)    TDAP/TD VACCINES (1 - Tdap) 02/10/2027 (Originally 7/31/1981)    MAMMOGRAM  04/26/2026    COLORECTAL CANCER SCREENING  05/01/2030    HEPATITIS C SCREENING  Completed       Physical Exam  Constitutional:       General: She is not in acute distress.     Appearance: Normal appearance.   HENT:      Head: Normocephalic and atraumatic.      Mouth/Throat:      Mouth: Mucous membranes are moist.   Eyes:      Extraocular Movements: Extraocular movements intact.   Cardiovascular:      Rate and Rhythm: Normal rate and regular rhythm.      Heart sounds: No murmur heard.  Pulmonary:      Effort: No respiratory distress.      Breath sounds: No wheezing, rhonchi or rales.   Abdominal:      General: Abdomen is flat.      Palpations: Abdomen is soft.      Tenderness: There is abdominal tenderness (Right upper quadrant and left lower quad).   Musculoskeletal:      Cervical back: Neck supple.      Comments: No pain to palpation of the thoracic back area   Skin:     General: Skin is warm and dry.   Neurological:      General: No focal deficit present.      Mental Status: She is alert and oriented to person, place, and time. Mental status is at baseline.   Psychiatric:         Mood and Affect: Mood normal.         Thought Content: Thought content normal.         Judgment: Judgment normal.      "     Physical Exam      Result Review :  The following data was reviewed by: Diogo Brooks DO on 03/17/2025:    No Images in the past 120 days found..     Results           ECG 12 Lead    Date/Time: 3/17/2025 8:58 AM  Performed by: Diogo Brooks DO    Authorized by: Diogo Brooks DO  Comparison: compared with previous ECG from 2/8/2023  Similar to previous ECG  Comments: Regular rhythm and rate of 89.  No significant ST changes noted.  Normal QTc.  Normal EKG.                Diagnoses and all orders for this visit:    1. Chest pain, unspecified type (Primary)  -     Treadmill Stress Test; Future  -     Comprehensive Metabolic Panel    2. Rheumatoid arthritis involving both hands with positive rheumatoid factor    3. Right upper quadrant pain  -     US Gallbladder; Future    4. Palpitations  -     TSH Rfx On Abnormal To Free T4  -     CBC & Differential    5. Screening for hyperlipidemia  -     Lipid Panel    6. Family history of heart attack    7. Recurrent UTI         Assessment & Plan  1. Chest discomfort: Potentially cardiac or gastrointestinal, with gallstones as a possible cause. EKG today is reassuring, stress test to rule out cardiac causes as patient is having worsening symptoms with activity. Right upper quadrant ultrasound for gallbladder issues as she has right upper quadrant pain on exam. Blood tests including cholesterol panel and thyroid function. Advised to seek immediate ER attention if symptoms worsen acutely. Referral to cardiologist if stress test indicates cardiac issue.  Patient's brother just had a heart attack recently.    2. Recurrent urinary tract infections: Reports recurrent E. coli infections post-bladder sling procedure in 2003. Awaiting CT bladder results. Advised to obtain results from [facility].    3. Osteoporosis: Currently on monthly alendronate, reports improvement. Continue current regimen.    PROCEDURE  Bladder sling procedure in 2003.    BMI is within normal  parameters. No other follow-up for BMI required.         FOLLOW UP  Return in about 6 weeks (around 4/28/2025) for chest pain.  Patient was given instructions and counseling regarding her condition or for health maintenance advice. Please see specific information pulled into the AVS if appropriate.     Patient or patient representative verbalized consent for the use of Ambient Listening during the visit with  Diogo Brooks DO for chart documentation. 3/17/2025  09:00 EDT    Diogo Brooks DO  03/17/25  08:28 EDT    CURRENT & DISCONTINUED MEDICATIONS  Current Outpatient Medications   Medication Instructions    Acetaminophen Extra Strength 500 MG tablet TAKE 2 TABLETS BY MOUTH EVERY 6 HOURS AS NEEDED FOR MILD OR MODERATE PAIN    alendronate (FOSAMAX) 70 mg, Oral, Every 7 Days    amoxicillin-clavulanate (AUGMENTIN) 875-125 MG per tablet 1 tablet, 3 times daily    cyclobenzaprine (FLEXERIL) 5 mg, Oral, 3 Times Daily PRN, for muscle spams    ibuprofen (ADVIL,MOTRIN) 600 mg, Oral, Every 6 Hours PRN    meloxicam (MOBIC) 7.5 mg, Oral, Daily    Mirabegron ER (MYRBETRIQ) 50 mg, Oral, Daily    pantoprazole (PROTONIX) 40 MG EC tablet 1 tablet, Daily    phenazopyridine (PYRIDIUM) 100 mg, Oral, 3 Times Daily PRN    sucralfate (CARAFATE) 1 GM/10ML suspension SHAKE LIQUID AND TAKE 10 ML BY MOUTH BEFORE MEALS FOR 14 DAYS       There are no discontinued medications.

## 2025-03-18 ENCOUNTER — TELEPHONE (OUTPATIENT)
Dept: FAMILY MEDICINE CLINIC | Facility: CLINIC | Age: 65
End: 2025-03-18
Payer: COMMERCIAL

## 2025-03-18 LAB — BACTERIA SPEC AEROBE CULT: NO GROWTH

## 2025-03-18 NOTE — TELEPHONE ENCOUNTER
Pt came in office requesting a Transfer of Care from Dr. Gonzalez to Dr. Brooks. Informed pt that the process is to send it on to the manager, then both providers have to consent, one to release and one to accept. If approved this will be a permanent transfer of care. Informed pt that a message would be sent to the manager for review.

## 2025-03-19 ENCOUNTER — TELEPHONE (OUTPATIENT)
Dept: FAMILY MEDICINE CLINIC | Facility: CLINIC | Age: 65
End: 2025-03-19
Payer: COMMERCIAL

## 2025-03-19 NOTE — TELEPHONE ENCOUNTER
"Relay     \"YOUR REQUEST TO TRANSFER CARE FROM DR ELIZALED TO DR LOCKWOOD HAS BEEN APPROVED. WE NEED TO SCHEDULE YOU AN APPOINTMENT TO ESTABLISH CARE WITH DR LOCKWOOD AT YOUR EARLIEST CONVENIENCE \"                "

## 2025-03-21 ENCOUNTER — TELEPHONE (OUTPATIENT)
Dept: FAMILY MEDICINE CLINIC | Facility: CLINIC | Age: 65
End: 2025-03-21
Payer: COMMERCIAL

## 2025-03-21 NOTE — TELEPHONE ENCOUNTER
Spoke to pt regarding her transfer from provider this was approved from both provider and pt has been schedule with Dr Brooks for a new pt savanah

## 2025-03-25 RX ORDER — MIRABEGRON 50 MG/1
50 TABLET, FILM COATED, EXTENDED RELEASE ORAL DAILY
Qty: 90 TABLET | Refills: 0 | Status: SHIPPED | OUTPATIENT
Start: 2025-03-25

## 2025-03-26 ENCOUNTER — OFFICE VISIT (OUTPATIENT)
Dept: FAMILY MEDICINE CLINIC | Facility: CLINIC | Age: 65
End: 2025-03-26
Payer: COMMERCIAL

## 2025-03-26 VITALS
OXYGEN SATURATION: 96 % | DIASTOLIC BLOOD PRESSURE: 60 MMHG | SYSTOLIC BLOOD PRESSURE: 102 MMHG | HEART RATE: 85 BPM | TEMPERATURE: 98 F | BODY MASS INDEX: 21.64 KG/M2 | WEIGHT: 146.1 LBS | HEIGHT: 69 IN

## 2025-03-26 DIAGNOSIS — K21.9 GASTROESOPHAGEAL REFLUX DISEASE WITHOUT ESOPHAGITIS: ICD-10-CM

## 2025-03-26 DIAGNOSIS — Z85.820 HISTORY OF MELANOMA: ICD-10-CM

## 2025-03-26 DIAGNOSIS — Z23 NEED FOR PNEUMOCOCCAL 20-VALENT CONJUGATE VACCINATION: ICD-10-CM

## 2025-03-26 DIAGNOSIS — M05.742 RHEUMATOID ARTHRITIS INVOLVING BOTH HANDS WITH POSITIVE RHEUMATOID FACTOR: ICD-10-CM

## 2025-03-26 DIAGNOSIS — R07.9 CHEST PAIN, UNSPECIFIED TYPE: Primary | ICD-10-CM

## 2025-03-26 DIAGNOSIS — M72.0 DUPUYTREN CONTRACTURE OF LEFT HAND: ICD-10-CM

## 2025-03-26 DIAGNOSIS — E78.00 PURE HYPERCHOLESTEROLEMIA: ICD-10-CM

## 2025-03-26 DIAGNOSIS — Z82.49 FAMILY HISTORY OF HEART ATTACK: ICD-10-CM

## 2025-03-26 DIAGNOSIS — K44.9 HIATAL HERNIA: ICD-10-CM

## 2025-03-26 DIAGNOSIS — R91.1 LUNG NODULE: ICD-10-CM

## 2025-03-26 DIAGNOSIS — R13.10 DYSPHAGIA, UNSPECIFIED TYPE: ICD-10-CM

## 2025-03-26 DIAGNOSIS — M05.741 RHEUMATOID ARTHRITIS INVOLVING BOTH HANDS WITH POSITIVE RHEUMATOID FACTOR: ICD-10-CM

## 2025-03-26 DIAGNOSIS — F41.1 GENERALIZED ANXIETY DISORDER: ICD-10-CM

## 2025-03-26 RX ORDER — ESCITALOPRAM OXALATE 5 MG/1
5 TABLET ORAL DAILY
Qty: 90 TABLET | Refills: 1 | Status: SHIPPED | OUTPATIENT
Start: 2025-03-26

## 2025-03-26 RX ORDER — PANTOPRAZOLE SODIUM 40 MG/1
40 TABLET, DELAYED RELEASE ORAL DAILY
Qty: 30 TABLET | Refills: 2 | Status: SHIPPED | OUTPATIENT
Start: 2025-03-26

## 2025-03-26 NOTE — PROGRESS NOTES
Chief Complaint  Follow-up (Chest pain comes and goes.)    Subjective      Shahida Arroyo is a 64 y.o. female who presents to [facility]     History of Present Illness  The patient is a 64-year-old female here for follow-up of chest pain and to establish care with another provider.    Chest Pain  - Reports no change in her sharp, inconsistent chest pain, likening it to being jabbed with a needle, and pressure between her shoulder blades.  - Has a stress test scheduled for tomorrow and an ultrasound next week.  - Chest pain has persisted for a long time without improvement.  - Had a CT scan of her chest 2 years ago for the same issue.  - Today notes that she does not have the pain and it seems to come randomly  - Most the time worse when laying down flat.  - She has not been on her pantoprazole for some time    Anxiety  - Acknowledges her anxiety but dislikes medication.  - Suspects her symptoms may be anxiety-related.  - Previously on buspirone 5 mg for anxiety but discontinued it after a week due to dizziness and gastrointestinal upset.  - Interested in trying a different medication.    Rheumatoid Arthritis  - Not under the care of a rheumatologist and not taking any anti-inflammatories.  - History of a fractured left arm in 5 places.  - Reports difficulty straightening her fingers and significant hand pain during cold or rainy weather.  - Positive RF although SEGUNDO was negative  - Does not appear to have CCP test or CRP test that was done  - Patient does have referral to rheumatologist although has not scheduled this yet.    Melanoma  - History of melanoma and sees a dermatologist annually for a full body checkup.  - Next appointment is in July.    Osteoporosis  - Takes bone medicine once a week.    Hiatal Hernia  - History of hiatal hernia and underwent an endoscopy due to swallowing difficulties in 2020.  - Describes a sensation of food sticking in her throat upon swallowing.  - Not  "currently taking pantoprazole as it did not alleviate her symptoms.  - Reports no adverse effects from the medication and does not experience acid reflux.  - Biopsy of the GE junction showed esophagitis although no Rosales's esophagus  - She does not say her symptoms have changed since 2020 although staying consistent    Elevated Cholesterol  - Elevated cholesterol levels despite a diet devoid of meat, sweets, processed foods, noodles, and rice.  - Maintains an active lifestyle and supplements with omega-3 and NAD.    Pneumonia  - History of pneumonia and is willing to receive the pneumonia vaccine today.    Supplemental information: She had a hysterectomy in 2017 due to bladder sling and had bladder repair at the same time. She does not see the urologist. She had her ovaries removed but does not know why, mother had ovarian cancer. She does not have menopause symptoms.    FAMILY HISTORY  Mother had stroke, heart disease, ovarian cancer, and breast cancer. Father had heart disease and diabetes. Brother recently had a heart attack. Grandson has diabetes. Many cousins have diabetes.    MEDICATIONS  Discontinued: buspirone, pantoprazole.       Objective   Vital Signs:   Vitals:    03/26/25 0819   BP: 102/60   BP Location: Right arm   Patient Position: Sitting   Pulse: 85   Temp: 98 °F (36.7 °C)   TempSrc: Oral   SpO2: 96%   Weight: 66.3 kg (146 lb 1.6 oz)   Height: 175.3 cm (69\")     Body mass index is 21.58 kg/m².    Wt Readings from Last 3 Encounters:   03/26/25 66.3 kg (146 lb 1.6 oz)   03/17/25 67.5 kg (148 lb 12.8 oz)   12/20/24 63.2 kg (139 lb 4.8 oz)     BP Readings from Last 3 Encounters:   03/26/25 102/60   03/17/25 108/66   12/20/24 108/62       Health Maintenance   Topic Date Due    Pneumococcal Vaccine 50+ (1 of 1 - PCV) Never done    ZOSTER VACCINE (1 of 2) Never done    COVID-19 Vaccine (1 - 2024-25 season) Never done    ANNUAL PHYSICAL  02/06/2025    INFLUENZA VACCINE  03/02/2026 (Originally 7/1/2024)    " TDAP/TD VACCINES (1 - Tdap) 02/10/2027 (Originally 7/31/1979)    MAMMOGRAM  04/26/2026    COLORECTAL CANCER SCREENING  05/01/2030    HEPATITIS C SCREENING  Completed       Physical Exam  Constitutional:       General: She is not in acute distress.     Appearance: Normal appearance.   HENT:      Head: Normocephalic and atraumatic.      Mouth/Throat:      Mouth: Mucous membranes are moist.   Eyes:      Extraocular Movements: Extraocular movements intact.   Cardiovascular:      Rate and Rhythm: Normal rate and regular rhythm.      Heart sounds: No murmur heard.  Pulmonary:      Effort: No respiratory distress.      Breath sounds: No wheezing, rhonchi or rales.   Abdominal:      General: Abdomen is flat.   Musculoskeletal:      Cervical back: Neck supple.      Comments: Contracture of the left third and fourth fingers of the flexor tendon.  Palmar nodule palpated as well worse on the fourth finger.   Skin:     General: Skin is warm and dry.   Neurological:      General: No focal deficit present.      Mental Status: She is alert and oriented to person, place, and time. Mental status is at baseline.   Psychiatric:         Mood and Affect: Mood normal.         Thought Content: Thought content normal.         Judgment: Judgment normal.          Physical Exam      Result Review :  The following data was reviewed by: Diogo Brooks DO on 03/26/2025:    No Images in the past 120 days found..     Results  Laboratory Studies  . Blood sugar 98.    Imaging  2023: CT chest 2 years ago showed no significant coronary artery calcification and a noncalcified 8 mm nodule superior to the right upper lobe.       Procedures          Diagnoses and all orders for this visit:    1. Chest pain, unspecified type (Primary)    2. Family history of heart attack    3. Generalized anxiety disorder  -     escitalopram (Lexapro) 5 MG tablet; Take 1 tablet by mouth Daily.  Dispense: 90 tablet; Refill: 1    4. Dysphagia, unspecified type  -      pantoprazole (PROTONIX) 40 MG EC tablet; Take 1 tablet by mouth Daily.  Dispense: 30 tablet; Refill: 2    5. Hiatal hernia  -     pantoprazole (PROTONIX) 40 MG EC tablet; Take 1 tablet by mouth Daily.  Dispense: 30 tablet; Refill: 2    6. Lung nodule  -     CT Chest Without Contrast; Future    7. Rheumatoid arthritis involving both hands with positive rheumatoid factor    8. Dupuytren contracture of left hand    9. Gastroesophageal reflux disease without esophagitis  -     pantoprazole (PROTONIX) 40 MG EC tablet; Take 1 tablet by mouth Daily.  Dispense: 30 tablet; Refill: 2    10. Need for pneumococcal 20-valent conjugate vaccination  -     Pneumococcal Conjugate Vaccine 20-Valent All    11. History of melanoma    12. Pure hypercholesterolemia         Assessment & Plan  1. Chest pain.  Symptoms consistent over the past few years. Discussed possibility of silent acid reflux. Stress test scheduled to rule out cardiac issues tomorrow. Prescribed pantoprazole daily in the morning, separate from other medications like Fosamax. If no improvement, consider another endoscopy.  Patient also having ultrasound of the gallbladder on April 1.  Will follow-up in 3 months.    2. Anxiety.  Prescribed Lexapro 5 mg daily. Explained it may take up to 1.5 months for full effect, with some improvement in 1-2 weeks. If effective and well-tolerated, can request an increase to 10 mg within the next month to month and a half.    3. Hiatal hernia.  History of hiatal hernia with mild reflux esophagitis on biopsy. Surgery deemed unnecessary unless symptoms worsen. Advised to continue pantoprazole daily.    4. Elevated cholesterol.  , 3% risk of MI or stroke within the next decade. Advised dietary modifications, weight management, increased physical activity, and supplements (fish oil, red rice yeast, increased oat intake). Monitor cholesterol every 6 months to 1 year.  CT scan from 2023 did not show coronary artery calcifications.    5.  Lung nodule.  Order follow-up CT scan in the coming months to monitor nodule size.  Last nodules 8 mm.    6. Dupuytren's contracture.  Advised to consult a rheumatologist. If unable to secure an appointment, will refer to another rheumatologist through [facility].  May be due to her rheumatoid arthritis.  If this becomes worse, may consider steroid injection.    7. Health maintenance.  Administer pneumonia vaccine today. Advised to get shingles vaccine at the pharmacy. Provided shingles pamphlet.    8. Melanoma.  History of melanoma, sees dermatologist annually. Next appointment in July.    9. Osteoporosis.  History of osteoporosis, takes bone medicine once a week.  T-score of the left femoral neck was -2.7 and in 2023.  Consider repeat DEXA scan in the next year.    Follow-up in 3 months.    PROCEDURE  Endoscopy revealed erythema at the GE junction, esophagitis, and hiatal hernia. Biopsy showed mild reflux esophagitis.    BMI is within normal parameters. No other follow-up for BMI required.         FOLLOW UP  Return in about 3 months (around 6/26/2025) for anxiety, dysphagia, rheumatoid arthritis.  Patient was given instructions and counseling regarding her condition or for health maintenance advice. Please see specific information pulled into the AVS if appropriate.     Patient or patient representative verbalized consent for the use of Ambient Listening during the visit with  Diogo Brooks DO for chart documentation. 3/26/2025  09:23 EDT    Diogo Brooks DO  03/26/25  09:21 EDT    CURRENT & DISCONTINUED MEDICATIONS  Current Outpatient Medications   Medication Instructions    Acetaminophen Extra Strength 500 MG tablet TAKE 2 TABLETS BY MOUTH EVERY 6 HOURS AS NEEDED FOR MILD OR MODERATE PAIN    alendronate (FOSAMAX) 70 mg, Oral, Every 7 Days    cyclobenzaprine (FLEXERIL) 5 mg, Oral, 3 Times Daily PRN, for muscle spams    escitalopram (LEXAPRO) 5 mg, Oral, Daily    ibuprofen (ADVIL,MOTRIN) 600 mg, Oral, Every 6  Hours PRN    meloxicam (MOBIC) 7.5 mg, Oral, Daily    Mirabegron ER (MYRBETRIQ) 50 mg, Oral, Daily    pantoprazole (PROTONIX) 40 mg, Oral, Daily    sucralfate (CARAFATE) 1 GM/10ML suspension SHAKE LIQUID AND TAKE 10 ML BY MOUTH BEFORE MEALS FOR 14 DAYS       Medications Discontinued During This Encounter   Medication Reason    phenazopyridine (Pyridium) 100 MG tablet     pantoprazole (PROTONIX) 40 MG EC tablet Reorder    amoxicillin-clavulanate (AUGMENTIN) 875-125 MG per tablet

## 2025-03-27 ENCOUNTER — HOSPITAL ENCOUNTER (OUTPATIENT)
Facility: HOSPITAL | Age: 65
Discharge: HOME | End: 2025-03-27
Admitting: STUDENT IN AN ORGANIZED HEALTH CARE EDUCATION/TRAINING PROGRAM
Payer: COMMERCIAL

## 2025-03-27 DIAGNOSIS — R07.9 CHEST PAIN, UNSPECIFIED TYPE: ICD-10-CM

## 2025-03-27 LAB
BH CV IMMEDIATE POST RECOVERY TECH DATA SYMPTOMS: NORMAL
BH CV IMMEDIATE POST TECH DATA BLOOD PRESSURE: NORMAL MMHG
BH CV IMMEDIATE POST TECH DATA HEART RATE: 162 BPM
BH CV IMMEDIATE POST TECH DATA OXYGEN SATS: 96 %
BH CV SIX MINUTE RECOVERY TECH DATA BLOOD PRESSURE: NORMAL
BH CV SIX MINUTE RECOVERY TECH DATA HEART RATE: 109 BPM
BH CV SIX MINUTE RECOVERY TECH DATA OXYGEN SATURATION: 97 %
BH CV SIX MINUTE RECOVERY TECH DATA SYMPTOMS: NORMAL
BH CV STRESS BP STAGE 1: NORMAL
BH CV STRESS BP STAGE 2: NORMAL
BH CV STRESS DURATION MIN STAGE 1: 3
BH CV STRESS DURATION MIN STAGE 2: 3
BH CV STRESS DURATION SEC STAGE 1: 0
BH CV STRESS DURATION SEC STAGE 2: 0
BH CV STRESS GRADE STAGE 1: 10
BH CV STRESS GRADE STAGE 2: 12
BH CV STRESS HR STAGE 1: 156
BH CV STRESS HR STAGE 2: 179
BH CV STRESS METS STAGE 1: 5
BH CV STRESS METS STAGE 2: 7.5
BH CV STRESS O2 STAGE 1: 94
BH CV STRESS O2 STAGE 2: 96
BH CV STRESS PROTOCOL 1: NORMAL
BH CV STRESS RECOVERY BP: NORMAL MMHG
BH CV STRESS RECOVERY HR: 109 BPM
BH CV STRESS RECOVERY O2: 97 %
BH CV STRESS SPEED STAGE 1: 1.7
BH CV STRESS SPEED STAGE 2: 2.5
BH CV STRESS STAGE 1: 1
BH CV STRESS STAGE 2: 2
BH CV THREE MINUTE POST TECH DATA BLOOD PRESSURE: NORMAL MMHG
BH CV THREE MINUTE POST TECH DATA HEART RATE: 130 BPM
BH CV THREE MINUTE POST TECH DATA OXYGEN SATURATION: 97 %
BH CV THREE MINUTE RECOVERY TECH DATA SYMPTOM: NORMAL
MAXIMAL PREDICTED HEART RATE: 158 BPM
PERCENT MAX PREDICTED HR: 113.29 %
STRESS BASELINE BP: NORMAL MMHG
STRESS BASELINE HR: 118 BPM
STRESS O2 SAT REST: 96 %
STRESS PERCENT HR: 133 %
STRESS POST ESTIMATED WORKLOAD: 7.1 METS
STRESS POST EXERCISE DUR MIN: 6 MIN
STRESS POST EXERCISE DUR SEC: 0 SEC
STRESS POST O2 SAT PEAK: 97 %
STRESS POST PEAK BP: NORMAL MMHG
STRESS POST PEAK HR: 179 BPM
STRESS TARGET HR: 134 BPM

## 2025-03-27 PROCEDURE — 93017 CV STRESS TEST TRACING ONLY: CPT

## 2025-06-16 DIAGNOSIS — M54.50 ACUTE BILATERAL LOW BACK PAIN WITHOUT SCIATICA: ICD-10-CM

## 2025-06-16 RX ORDER — CYCLOBENZAPRINE HCL 5 MG
5 TABLET ORAL 3 TIMES DAILY PRN
Qty: 90 TABLET | Refills: 0 | Status: SHIPPED | OUTPATIENT
Start: 2025-06-16

## 2025-06-16 RX ORDER — ALENDRONATE SODIUM 70 MG/1
70 TABLET ORAL WEEKLY
Qty: 12 TABLET | Refills: 1 | Status: SHIPPED | OUTPATIENT
Start: 2025-06-16

## 2025-07-01 DIAGNOSIS — M54.50 ACUTE BILATERAL LOW BACK PAIN WITHOUT SCIATICA: ICD-10-CM

## 2025-07-02 RX ORDER — CYCLOBENZAPRINE HCL 5 MG
5 TABLET ORAL 3 TIMES DAILY PRN
Qty: 30 TABLET | Refills: 0 | Status: SHIPPED | OUTPATIENT
Start: 2025-07-02

## 2025-08-17 DIAGNOSIS — K44.9 HIATAL HERNIA: ICD-10-CM

## 2025-08-17 DIAGNOSIS — R13.10 DYSPHAGIA, UNSPECIFIED TYPE: ICD-10-CM

## 2025-08-17 DIAGNOSIS — K21.9 GASTROESOPHAGEAL REFLUX DISEASE WITHOUT ESOPHAGITIS: ICD-10-CM

## 2025-08-19 RX ORDER — PANTOPRAZOLE SODIUM 40 MG/1
40 TABLET, DELAYED RELEASE ORAL DAILY
Qty: 90 TABLET | Refills: 1 | Status: SHIPPED | OUTPATIENT
Start: 2025-08-19

## (undated) DEVICE — KIRSCHNER WIRE
Type: IMPLANTABLE DEVICE | Site: HUMERUS | Status: NON-FUNCTIONAL
Removed: 2021-09-02

## (undated) DEVICE — GAUZE,SPONGE,4"X4",16PLY,STRL,LF,10/TRAY: Brand: MEDLINE

## (undated) DEVICE — DRILL BIT LOCKING, SHORT: Brand: AXSOS

## (undated) DEVICE — MAT FLR ABS W/BLU/LINER 56X72IN WHT

## (undated) DEVICE — PROXIMATE RH ROTATING HEAD SKIN STAPLERS (35 WIDE) CONTAINS 35 STAINLESS STEEL STAPLES: Brand: PROXIMATE

## (undated) DEVICE — STERILE POLYISOPRENE POWDER-FREE SURGICAL GLOVES: Brand: PROTEXIS

## (undated) DEVICE — DRSNG SURG AQUACEL AG 9X10CM

## (undated) DEVICE — DRILL BIT NON-LOCKING, SHORT: Brand: AXSOS

## (undated) DEVICE — 3M™ STERI-DRAPE™ X-RAY IMAGE INTENSIFIER DRAPE, 10 PER CARTON / 4 CARTONS PER CASE, 1013: Brand: STERI-DRAPE™

## (undated) DEVICE — GLV SURG SENSICARE PI ORTHO SZ8 LF STRL

## (undated) DEVICE — CORTICAL OPENER , AO FITTING: Brand: AXSOS

## (undated) DEVICE — PAD GRND REM POLYHESIVE A/ DISP

## (undated) DEVICE — APPL CHLORAPREP HI/LITE 26ML ORNG

## (undated) DEVICE — 3M™ IOBAN™ 2 ANTIMICROBIAL INCISE DRAPE 6650EZ: Brand: IOBAN™ 2

## (undated) DEVICE — BNDG ELAS DELUXE REINF 15CM 5MTR STRL

## (undated) DEVICE — SUT VIC 2/0 CT1 36IN

## (undated) DEVICE — SUT VIC UD BR COAT 0 CP2 27IN

## (undated) DEVICE — INTENDED FOR TISSUE SEPARATION, AND OTHER PROCEDURES THAT REQUIRE A SHARP SURGICAL BLADE TO PUNCTURE OR CUT.: Brand: BARD-PARKER ® CARBON RIB-BACK BLADES

## (undated) DEVICE — COMFORT ARM SLING: Brand: DEROYAL

## (undated) DEVICE — DRSNG PAD ABD 8X10IN STRL

## (undated) DEVICE — EXTREMITY-LF: Brand: MEDLINE INDUSTRIES, INC.

## (undated) DEVICE — DRSNG SURG AQUACEL AG 9X25CM

## (undated) DEVICE — 3M™ STERI-DRAPE™ U-DRAPE 1015: Brand: STERI-DRAPE™